# Patient Record
Sex: MALE | Race: WHITE | NOT HISPANIC OR LATINO | Employment: OTHER | URBAN - METROPOLITAN AREA
[De-identification: names, ages, dates, MRNs, and addresses within clinical notes are randomized per-mention and may not be internally consistent; named-entity substitution may affect disease eponyms.]

---

## 2022-06-07 ENCOUNTER — OFFICE VISIT (OUTPATIENT)
Dept: INTERNAL MEDICINE CLINIC | Facility: CLINIC | Age: 82
End: 2022-06-07
Payer: MEDICARE

## 2022-06-07 VITALS
OXYGEN SATURATION: 97 % | SYSTOLIC BLOOD PRESSURE: 108 MMHG | BODY MASS INDEX: 26.51 KG/M2 | TEMPERATURE: 98.2 F | DIASTOLIC BLOOD PRESSURE: 78 MMHG | HEIGHT: 70 IN | HEART RATE: 94 BPM | WEIGHT: 185.2 LBS

## 2022-06-07 DIAGNOSIS — Z87.891 FORMER SMOKER: ICD-10-CM

## 2022-06-07 DIAGNOSIS — J43.9 PULMONARY EMPHYSEMA, UNSPECIFIED EMPHYSEMA TYPE (HCC): Primary | ICD-10-CM

## 2022-06-07 DIAGNOSIS — F41.9 ANXIETY: ICD-10-CM

## 2022-06-07 DIAGNOSIS — K62.89 ANAL SPHINCTER INCOMPETENCE: ICD-10-CM

## 2022-06-07 DIAGNOSIS — L29.0 PRURITUS, PERIANAL: ICD-10-CM

## 2022-06-07 PROCEDURE — 1123F ACP DISCUSS/DSCN MKR DOCD: CPT | Performed by: INTERNAL MEDICINE

## 2022-06-07 PROCEDURE — 99205 OFFICE O/P NEW HI 60 MIN: CPT | Performed by: INTERNAL MEDICINE

## 2022-06-07 PROCEDURE — G0438 PPPS, INITIAL VISIT: HCPCS | Performed by: INTERNAL MEDICINE

## 2022-06-07 RX ORDER — HYDROCORTISONE 25 MG/G
1 CREAM TOPICAL AS NEEDED
COMMUNITY
Start: 2022-03-03 | End: 2022-06-07 | Stop reason: SDUPTHER

## 2022-06-07 RX ORDER — ALPRAZOLAM 0.5 MG/1
1 TABLET ORAL 3 TIMES DAILY PRN
COMMUNITY
Start: 2022-03-03 | End: 2022-06-07 | Stop reason: SDUPTHER

## 2022-06-07 RX ORDER — ALBUTEROL SULFATE 90 UG/1
2 AEROSOL, METERED RESPIRATORY (INHALATION) EVERY 4 HOURS PRN
Qty: 18 G | Refills: 5 | Status: SHIPPED | OUTPATIENT
Start: 2022-06-07 | End: 2023-06-02

## 2022-06-07 RX ORDER — FORMOTEROL FUMARATE 20 UG/2ML
20 SOLUTION RESPIRATORY (INHALATION) EVERY MORNING
COMMUNITY

## 2022-06-07 RX ORDER — ALPRAZOLAM 0.5 MG/1
0.5 TABLET ORAL 2 TIMES DAILY PRN
Qty: 60 TABLET | Refills: 1 | Status: SHIPPED | OUTPATIENT
Start: 2022-06-07

## 2022-06-07 RX ORDER — HYDROCORTISONE 25 MG/G
1 CREAM TOPICAL AS NEEDED
Qty: 28 G | Refills: 5 | Status: SHIPPED | OUTPATIENT
Start: 2022-06-07

## 2022-06-07 RX ORDER — ALBUTEROL SULFATE 90 UG/1
2 AEROSOL, METERED RESPIRATORY (INHALATION) EVERY 4 HOURS PRN
COMMUNITY
Start: 2022-03-29 | End: 2022-06-07 | Stop reason: SDUPTHER

## 2022-06-07 NOTE — PROGRESS NOTES
Assessment and Plan:     Problem List Items Addressed This Visit    None          Preventive health issues were discussed with patient, and age appropriate screening tests were ordered as noted in patient's After Visit Summary  Personalized health advice and appropriate referrals for health education or preventive services given if needed, as noted in patient's After Visit Summary  History of Present Illness:     Patient presents for Medicare Annual Wellness visit    No care team member to display     Problem List:     There is no problem list on file for this patient  Past Medical and Surgical History:     Past Medical History:   Diagnosis Date    BPH (benign prostatic hyperplasia)     COPD (chronic obstructive pulmonary disease) ([facility] )     Crohn's disease ([facility] )     Depression     Renal lesion     Restless legs syndrome      History reviewed  No pertinent surgical history  Family History:     Family History   Problem Relation Age of Onset    Stomach cancer Mother     Alzheimer's disease Father       Social History:     Social History     Socioeconomic History    Marital status:       Spouse name: None    Number of children: None    Years of education: None    Highest education level: None   Occupational History    None   Tobacco Use    Smoking status: Former Smoker     Packs/day: 0 50     Years: 50 00     Pack years: 25 00     Types: Cigarettes     Quit date:      Years since quittin 4    Smokeless tobacco: Never Used   Vaping Use    Vaping Use: Never used   Substance and Sexual Activity    Alcohol use: Not Currently    Drug use: Never    Sexual activity: None   Other Topics Concern    None   Social History Narrative    None     Social Determinants of Health     Financial Resource Strain: Not on file   Food Insecurity: Not on file   Transportation Needs: Not on file   Physical Activity: Not on file   Stress: Not on file   Social Connections: Not on file   Intimate Partner Violence: Not on file   Housing Stability: Not on file      Medications and Allergies:     Current Outpatient Medications   Medication Sig Dispense Refill    albuterol (PROVENTIL HFA,VENTOLIN HFA) 90 mcg/act inhaler Inhale 2 puffs every 4 (four) hours as needed for wheezing      ALPRAZolam (XANAX) 0 5 mg tablet Take 1 tablet by mouth 3 (three) times a day as needed      formoterol (PERFOROMIST) 20 MCG/2ML nebulizer solution Take 20 mcg by nebulization every morning      hydrocortisone (ANUSOL-HC) 2 5 % rectal cream Apply 1 application topically as needed for hemorrhoids       No current facility-administered medications for this visit  No Known Allergies   Immunizations:     Immunization History   Administered Date(s) Administered    COVID-19 MODERNA VACC 0 5 ML IM 03/16/2021, 04/13/2021      Health Maintenance: There are no preventive care reminders to display for this patient  Topic Date Due    DTaP,Tdap,and Td Vaccines (1 - Tdap) Never done    COVID-19 Vaccine (3 - Booster for Moderna series) 09/13/2021    Influenza Vaccine (Season Ended) 09/01/2022      Medicare Health Risk Assessment:     /78 (BP Location: Left arm, Patient Position: Sitting, Cuff Size: Standard)   Pulse 94   Temp 98 2 °F (36 8 °C) (Tympanic)   Ht 5' 9 61" (1 768 m)   Wt 84 kg (185 lb 3 2 oz)   SpO2 97%   BMI 26 88 kg/m²      Rashaun White is here for his Subsequent Wellness visit  Health Risk Assessment:   Patient rates overall health as good  Patient feels that their physical health rating is same  Patient is satisfied with their life  Eyesight was rated as same  Hearing was rated as same  Patient feels that their emotional and mental health rating is same  Patients states they are never, rarely angry  Patient states they are sometimes unusually tired/fatigued  Pain experienced in the last 7 days has been a lot  Patient's pain rating has been 5/10   Patient states that he has experienced weight loss or gain in last 6 months  Depression Screening:   PHQ-2 Score: 6      Fall Risk Screening: In the past year, patient has experienced: no history of falling in past year      Home Safety:  Patient does not have trouble with stairs inside or outside of their home  Patient has working smoke alarms and has working carbon monoxide detector  Home safety hazards include: none  Nutrition:   Current diet is Regular  Medications:   Patient is currently taking over-the-counter supplements  OTC medications include: see medication list  Patient is able to manage medications  Activities of Daily Living (ADLs)/Instrumental Activities of Daily Living (IADLs):   Walk and transfer into and out of bed and chair?: Yes  Dress and groom yourself?: Yes    Bathe or shower yourself?: Yes    Feed yourself? Yes  Do your laundry/housekeeping?: No  Manage your money, pay your bills and track your expenses?: No  Make your own meals?: No    Do your own shopping?: No    Durable [address] [place] [address]    Previous Hospitalizations:   Any hospitalizations or ED visits within the last 12 months?: No      Advance Care Planning:   Living will: Yes    Durable POA for healthcare:  Yes    Advanced directive: Yes    Advanced directive counseling given: Yes    Five wishes given: No    Patient declined ACP directive: Yes    End of Life Decisions reviewed with patient: Yes    Provider agrees with end of life decisions: Yes      Cognitive Screening:   Provider or family/friend/caregiver concerned regarding cognition?: No    PREVENTIVE SCREENINGS      Cardiovascular Screening:    General: Patient Declines      Diabetes Screening:     General: Screening Current      Colorectal Cancer Screening:     General: Screening Not Indicated and Patient Declines      Prostate Cancer Screening:    General: Screening Not Indicated and Patient Declines      Osteoporosis Screening:    General: Screening Not Indicated and Patient Declines Abdominal Aortic Aneurysm (AAA) Screening:    Risk factors include: tobacco use        General: Screening Not Indicated      Lung Cancer Screening:     General: Screening Not Indicated      Hepatitis C Screening:    General: Screening Not Indicated    Screening, Brief Intervention, and Referral to Treatment (SBIRT)    Screening  Typical number of drinks in a day: 0  Typical number of drinks in a week: 0  Interpretation: Low risk drinking behavior      Single Item Drug Screening:  How often have you used an illegal drug (including marijuana) or a prescription medication for non-medical reasons in the past year? never    Single Item Drug Screen Score: 0  Interpretation: Negative screen for possible drug use disorder      Bony Portillo MD

## 2022-06-07 NOTE — ASSESSMENT & PLAN NOTE
CT lungs cancer screening is no longer necessary as it has been greater than 15 years since he has discontinued smoking and previous screenings did not disclose any significant change in findings

## 2022-06-07 NOTE — PROGRESS NOTES
Assessment/Plan:    Anxiety  Will continue alprazolam 0 5 mg once or twice daily as needed for anxiety    Pulmonary emphysema (HCC)  Clinically stable at the present time  Patient is not using a long-acting agent and is only using his albuterol inhaler on an as-needed basis  Anal sphincter incompetence  Consequent to hemorrhoidal surgery where his anal sphincter was inadvertently cut by the surgeon    Former smoker  CT lungs cancer screening is no longer necessary as it has been greater than 15 years since he has discontinued smoking and previous screenings did not disclose any significant change in findings       Diagnoses and all orders for this visit:    Pulmonary emphysema, unspecified emphysema type (HCC)  -     albuterol (PROVENTIL HFA,VENTOLIN HFA) 90 mcg/act inhaler; Inhale 2 puffs every 4 (four) hours as needed for wheezing  -     CBC and differential; Future  -     Comprehensive metabolic panel; Future  -     T3; Future  -     T4, free; Future  -     TSH, 3rd generation; Future    Anxiety  -     ALPRAZolam (XANAX) 0 5 mg tablet; Take 1 tablet (0 5 mg total) by mouth 2 (two) times a day as needed for anxiety  -     CBC and differential; Future  -     Comprehensive metabolic panel; Future  -     T3; Future  -     T4, free; Future  -     TSH, 3rd generation; Future    Pruritus, perianal  -     hydrocortisone (ANUSOL-HC) 2 5 % rectal cream; Apply 1 application topically as needed for hemorrhoids  -     CBC and differential; Future  -     Comprehensive metabolic panel; Future  -     T3; Future  -     T4, free; Future  -     TSH, 3rd generation; Future    Anal sphincter incompetence    Former smoker    Other orders  -     Discontinue: albuterol (PROVENTIL HFA,VENTOLIN HFA) 90 mcg/act inhaler; Inhale 2 puffs every 4 (four) hours as needed for wheezing  -     Discontinue: ALPRAZolam (XANAX) 0 5 mg tablet;  Take 1 tablet by mouth 3 (three) times a day as needed  -     Discontinue: hydrocortisone (ANUSOL-HC) 2 5 % rectal cream; Apply 1 application topically as needed for hemorrhoids  -     formoterol (PERFOROMIST) 20 MCG/2ML nebulizer solution; Take 20 mcg by nebulization every morning          Subjective:      Patient ID: Hiren Saha is a 79 y o  male  Patient presents to the office to establish primary care  He has a new resident at [facility] in [place]  He has a history of chronic obstructive pulmonary disease  Former smoker having quit greater than 15 years ago  He had been under surveillance for some pulmonary nodules but these have been stable and active surveillance is no longer warranted  He has been diagnosed with COPD  Most recent pulmonary function studies showed a mild decline from those done 2 years prior FEV1 is 1 18 L  FVC is normal, diffusing capacity is low at 59% and DLCO is low consistent with emphysema  Lab work was reviewed  Hemoglobin A1c is nondiabetic  Metabolic panel is unremarkable  CBC was normal and patient was euthyroid  Lipid profile was at goal      Family History   Problem Relation Age of Onset    Stomach cancer Mother     Alzheimer's disease Father      Social History     Socioeconomic History    Marital status:       Spouse name: Not on file    Number of children: Not on file    Years of education: Not on file    Highest education level: Not on file   Occupational History    Not on file   Tobacco Use    Smoking status: Former Smoker     Packs/day: 0 50     Years: 50 00     Pack years: 25 00     Types: Cigarettes     Quit date: 56     Years since quittin 4    Smokeless tobacco: Never Used   Vaping Use    Vaping Use: Never used   Substance and Sexual Activity    Alcohol use: Not Currently    Drug use: Never    Sexual activity: Not on file   Other Topics Concern    Not on file   Social History Narrative    Not on file     Social Determinants of Health     Financial Resource Strain: Not on file   Food Insecurity: Not on file Transportation Needs: Not on file   Physical Activity: Not on file   Stress: Not on file   Social Connections: Not on file   Intimate Partner Violence: Not on file   Housing Stability: Not on file     Past Medical History:   Diagnosis Date    BPH (benign prostatic hyperplasia)     Crohn's disease (HCC)     Restless legs syndrome        Current Outpatient Medications:     albuterol (PROVENTIL HFA,VENTOLIN HFA) 90 mcg/act inhaler, Inhale 2 puffs every 4 (four) hours as needed for wheezing, Disp: 18 g, Rfl: 5    ALPRAZolam (XANAX) 0 5 mg tablet, Take 1 tablet (0 5 mg total) by mouth 2 (two) times a day as needed for anxiety, Disp: 60 tablet, Rfl: 1    formoterol (PERFOROMIST) 20 MCG/2ML nebulizer solution, Take 20 mcg by nebulization every morning, Disp: , Rfl:     hydrocortisone (ANUSOL-HC) 2 5 % rectal cream, Apply 1 application topically as needed for hemorrhoids, Disp: 28 g, Rfl: 5  No Known Allergies  History reviewed  No pertinent surgical history  Review of Systems   Constitutional: Negative  HENT: Negative  Eyes: Negative  Respiratory: Negative  Cardiovascular: Negative  Gastrointestinal: Negative  Endocrine: Negative  Genitourinary: Negative  Musculoskeletal: Positive for gait problem (Patient appears parkinsonian)  Skin: Negative  Allergic/Immunologic: Negative  Neurological: Positive for speech difficulty (Stammering speech at times) and weakness (Proximal thigh muscles)  Hematological: Negative  Psychiatric/Behavioral: Negative  Objective:      /78 (BP Location: Left arm, Patient Position: Sitting, Cuff Size: Standard)   Pulse 94   Temp 98 2 °F (36 8 °C) (Tympanic)   Ht 5' 9 61" (1 768 m)   Wt 84 kg (185 lb 3 2 oz)   SpO2 97%   BMI 26 88 kg/m²     Depression Screening Follow-up Plan: Patient's depression screening was positive with a PHQ-2 score of 6  Their PHQ-9 score was 10   Patient's depressive symptoms likely due to other medical condition  Would recommend treatment of underlying condition  Will continue to monitor at next office visit  Physical Exam  Vitals reviewed  Constitutional:       General: He is not in acute distress  Appearance: He is normal weight  He is not toxic-appearing or diaphoretic  HENT:      Head: Normocephalic and atraumatic  Right Ear: Tympanic membrane and external ear normal       Left Ear: Tympanic membrane and external ear normal       Nose: Nose normal       Mouth/Throat:      Mouth: Mucous membranes are moist    Eyes:      General: No scleral icterus  Conjunctiva/sclera: Conjunctivae normal       Pupils: Pupils are equal, round, and reactive to light  Cardiovascular:      Rate and Rhythm: Normal rate and regular rhythm  Pulses: Normal pulses  Heart sounds: Normal heart sounds  No murmur heard  Pulmonary:      Effort: Pulmonary effort is normal  No respiratory distress  Breath sounds: No wheezing or rales  Comments: Diminished breath sounds throughout both lung fields  Abdominal:      General: Abdomen is flat  Bowel sounds are normal  There is no distension  Musculoskeletal:      Cervical back: Neck supple  No rigidity or tenderness  Right lower leg: Edema (Trace pretibial edema) present  Left lower leg: Edema (Trace pretibial edema) present  Skin:     General: Skin is warm  Capillary Refill: Capillary refill takes less than 2 seconds  Coloration: Skin is not jaundiced  Findings: No bruising, erythema or rash  Neurological:      General: No focal deficit present  Mental Status: He is alert and oriented to person, place, and time  Mental status is at baseline  Coordination: Coordination abnormal (Proximal thigh muscle weakness)  Gait: Gait abnormal (Gait appears parkinsonian as does his stature)  Comments: Mild tremor    Very flat affect, almost parkinsonian like   Psychiatric:         Mood and Affect: Mood normal  Behavior: Behavior normal          Thought Content:  Thought content normal          Judgment: Judgment normal

## 2022-06-07 NOTE — ASSESSMENT & PLAN NOTE
Clinically stable at the present time  Patient is not using a long-acting agent and is only using his albuterol inhaler on an as-needed basis

## 2022-07-29 ENCOUNTER — TELEPHONE (OUTPATIENT)
Dept: INTERNAL MEDICINE CLINIC | Facility: CLINIC | Age: 82
End: 2022-07-29

## 2022-07-29 NOTE — TELEPHONE ENCOUNTER
Patient started experiencing constipation and abdominal pressure yesterday  Wanted to see if there were any over the counter medications he can try  Says that there is an enema from the pharmacy that he has not tried yet, wanted to reach out to the doctor before

## 2022-08-23 ENCOUNTER — TRANSITIONAL CARE MANAGEMENT (OUTPATIENT)
Dept: INTERNAL MEDICINE CLINIC | Facility: OTHER | Age: 82
End: 2022-08-23

## 2022-08-24 ENCOUNTER — OFFICE VISIT (OUTPATIENT)
Dept: INTERNAL MEDICINE CLINIC | Facility: CLINIC | Age: 82
End: 2022-08-24
Payer: MEDICARE

## 2022-08-24 VITALS
SYSTOLIC BLOOD PRESSURE: 126 MMHG | DIASTOLIC BLOOD PRESSURE: 80 MMHG | WEIGHT: 185.19 LBS | RESPIRATION RATE: 20 BRPM | BODY MASS INDEX: 26.51 KG/M2 | HEIGHT: 70 IN | HEART RATE: 76 BPM

## 2022-08-24 DIAGNOSIS — G20 PRIMARY PARKINSONISM (HCC): ICD-10-CM

## 2022-08-24 DIAGNOSIS — J44.1 COPD EXACERBATION (HCC): ICD-10-CM

## 2022-08-24 DIAGNOSIS — J43.2 CENTRILOBULAR EMPHYSEMA (HCC): ICD-10-CM

## 2022-08-24 DIAGNOSIS — R26.2 AMBULATORY DYSFUNCTION: Primary | ICD-10-CM

## 2022-08-24 PROBLEM — G20.C PRIMARY PARKINSONISM: Status: ACTIVE | Noted: 2022-08-24

## 2022-08-24 PROBLEM — K64.9 HEMORRHOIDS: Status: ACTIVE | Noted: 2022-07-30

## 2022-08-24 PROBLEM — F32.A DEPRESSION: Status: ACTIVE | Noted: 2022-08-02

## 2022-08-24 PROBLEM — N40.0 BPH (BENIGN PROSTATIC HYPERPLASIA): Status: ACTIVE | Noted: 2022-07-30

## 2022-08-24 PROBLEM — K50.10 CROHN'S COLITIS (HCC): Status: ACTIVE | Noted: 2022-07-30

## 2022-08-24 PROCEDURE — 99496 TRANSJ CARE MGMT HIGH F2F 7D: CPT | Performed by: INTERNAL MEDICINE

## 2022-08-24 RX ORDER — POLYETHYLENE GLYCOL 3350 17 G/17G
17 POWDER, FOR SOLUTION ORAL DAILY
COMMUNITY
Start: 2022-08-22

## 2022-08-24 RX ORDER — AMOXICILLIN 250 MG
1 CAPSULE ORAL 2 TIMES DAILY
COMMUNITY
Start: 2022-08-22 | End: 2022-09-21

## 2022-08-24 NOTE — PROGRESS NOTES
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[facility] )  Stable  Not currently requiring any medications, continue to monitor    Pulmonary emphysema (HCC)  Clinically stable  No wheezing noted on examination continues with Spiriva Respimat and Perforomist nebulizer solution    Ambulatory dysfunction  To continue with physical therapy here at the facility  He was recently discharged from short-term rehab where he did well and progressed to the point where he can now return to his assisted living facility    COPD exacerbation ([facility] )  Clinically stable  Remains on 2 L nasal cannula  Pulmonary examination significant for clear lungs with diminished breath sounds bilaterally scant localized wheezing in the right upper lobe  TCM Call     Date and time call was made  8/23/2022  8:52 AM    Hospital care reviewed  Records reviewed    Patient was hospitialized at  Other (comment)    Comment  [facility]    Date of Admission  08/02/22    Date of discharge  08/22/22    Diagnosis  cognitive impairment, Parkinsonism    Disposition  Assisted Living      TCM Call     Scheduled for follow up? 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[facility] )    Other orders  -     tiotropium (SPIRIVA RESPIMAT) 2 5 MCG/ACT AERS inhaler; Inhale 2 puffs daily  -     senna-docusate sodium (SENOKOT S) 8 6-50 mg per tablet; Take 1 tablet by mouth 2 (two) times a day  -     polyethylene glycol (GLYCOLAX) 17 GM/SCOOP powder; Take 17 g by mouth in the morning          Subjective:      Patient ID: Fabio Parker is a 79 y o  male      Patient is seen for transition of care management following recent hospitalization at [facility] and then subsequent transfer to post acute rehab due to deconditioning and ambulatory dysfunction  He was hospitalized on the   Rapid COVID test the time of admission was negative by when the patient was admitted to short-term rehab his routine COVID screening PCR was positive  He was treated with 5 days of remdesivir  [facility]  He remains somewhat weak in his legs while ambulating so he will continue with physical therapy 3 times weekly  He feels short of breath still but is not coughing  He does not feel as if he is struggling to breathe but has very little reserve when ambulating  He is oxygen dependent 2 L minute at the present time  Prior to his admission he was only using the oxygen at night  He is not experiencing any productive cough  He denies any diarrhea or abdominal pain  He tends to be a little bit on the constipated side  Appetite is fair  Family History   Problem Relation Age of Onset    Stomach cancer Mother     Alzheimer's disease Father      Social History     Socioeconomic History    Marital status:       Spouse name: Not on file    Number of children: Not on file    Years of education: Not on file    Highest education level: Not on file   Occupational History    Not on file   Tobacco Use    Smoking status: Former Smoker     Packs/day: 0 50     Years: 50 00     Pack years: 25 00     Types: Cigarettes     Quit date: 56     Years since quittin 6    Smokeless tobacco: Never Used   Vaping Use    Vaping Use: Never used   Substance and Sexual Activity    Alcohol use: Not Currently    Drug use: Never    Sexual activity: Not on file   Other Topics Concern    Not on file   Social History Narrative    Not on file     Social Determinants of Health     Financial Resource Strain: Not on file   Food Insecurity: Not on file   Transportation Needs: Not on file Physical Activity: Not on file   Stress: Not on file   Social Connections: Not on file   Intimate Partner Violence: Not on file   Housing Stability: Not on file     Past Medical History:   Diagnosis Date    BPH (benign prostatic hyperplasia)     Crohn's disease (HCC)     Restless legs syndrome        Current Outpatient Medications:     polyethylene glycol (GLYCOLAX) 17 GM/SCOOP powder, Take 17 g by mouth in the morning, Disp: , Rfl:     senna-docusate sodium (SENOKOT S) 8 6-50 mg per tablet, Take 1 tablet by mouth 2 (two) times a day, Disp: , Rfl:     tiotropium (SPIRIVA RESPIMAT) 2 5 MCG/ACT AERS inhaler, Inhale 2 puffs daily, Disp: , Rfl:     albuterol (PROVENTIL HFA,VENTOLIN HFA) 90 mcg/act inhaler, Inhale 2 puffs every 4 (four) hours as needed for wheezing, Disp: 18 g, Rfl: 5    ALPRAZolam (XANAX) 0 5 mg tablet, Take 1 tablet (0 5 mg total) by mouth 2 (two) times a day as needed for anxiety, Disp: 60 tablet, Rfl: 1    formoterol (PERFOROMIST) 20 MCG/2ML nebulizer solution, Take 20 mcg by nebulization every morning, Disp: , Rfl:     hydrocortisone (ANUSOL-HC) 2 5 % rectal cream, Apply 1 application topically as needed for hemorrhoids, Disp: 28 g, Rfl: 5  No Known Allergies  No past surgical history on file  Review of Systems   Constitutional: Positive for activity change (Poor exercise tolerance) and fatigue  Negative for appetite change, chills and fever  HENT: Negative  Eyes: Negative  Respiratory: Positive for cough, shortness of breath and wheezing (Occasional)  Cardiovascular: Positive for leg swelling (Minimal)  Negative for chest pain  Gastrointestinal: Positive for constipation  Negative for diarrhea, nausea and rectal pain  Endocrine: Negative  Genitourinary: Negative  Musculoskeletal: Negative  Allergic/Immunologic: Negative  Neurological: Positive for tremors (Mild pill rolling and cogwheeling )  Negative for numbness and headaches  Hematological: Negative  Psychiatric/Behavioral: Negative  Objective: There were no vitals taken for this visit  Physical Exam  Vitals reviewed  Constitutional:       General: He is not in acute distress  Appearance: He is normal weight  He is ill-appearing (Chronically)  He is not toxic-appearing or diaphoretic  HENT:      Head: Normocephalic and atraumatic  Comments: Masked facies     Right Ear: Tympanic membrane, ear canal and external ear normal       Left Ear: Tympanic membrane, ear canal and external ear normal       Nose: Nose normal       Mouth/Throat:      Mouth: Mucous membranes are moist       Pharynx: Oropharynx is clear  Eyes:      General: No scleral icterus  Conjunctiva/sclera: Conjunctivae normal       Pupils: Pupils are equal, round, and reactive to light  Neck:      Vascular: No carotid bruit  Cardiovascular:      Rate and Rhythm: Normal rate and regular rhythm  Pulses: Normal pulses  Heart sounds: Normal heart sounds  No murmur heard  Pulmonary:      Effort: Pulmonary effort is normal       Breath sounds: No stridor  Wheezing (Scattered right upper lobe wheezing) present  No rhonchi or rales  Comments: Diminished breath sounds throughout both lung fields  Abdominal:      General: Abdomen is flat  Bowel sounds are normal  There is no distension  Palpations: There is no mass  Tenderness: There is no abdominal tenderness  Musculoskeletal:         General: No swelling  Cervical back: Neck supple  No rigidity  Right lower leg: Edema (Trace to 1+ pedal edema) present  Left lower leg: Edema (Trace to 1+ pedal edema) present  Skin:     General: Skin is warm  Capillary Refill: Capillary refill takes less than 2 seconds  Coloration: Skin is not jaundiced  Findings: No bruising, erythema or rash  Neurological:      General: No focal deficit present  Mental Status: He is alert and oriented to person, place, and time  Mental status is at baseline  Motor: Weakness present  Coordination: Coordination abnormal       Gait: Gait abnormal (Parkinsonian)     Psychiatric:         Mood and Affect: Mood normal          Behavior: Behavior normal

## 2022-08-24 NOTE — ASSESSMENT & PLAN NOTE
To continue with physical therapy here at the facility    He was recently discharged from short-term rehab where he did well and progressed to the point where he can now return to his assisted living facility

## 2022-08-24 NOTE — ASSESSMENT & PLAN NOTE
Clinically stable    No wheezing noted on examination continues with Spiriva Respimat and Perforomist nebulizer solution

## 2022-08-24 NOTE — ASSESSMENT & PLAN NOTE
Clinically stable  Remains on 2 L nasal cannula  Pulmonary examination significant for clear lungs with diminished breath sounds bilaterally scant localized wheezing in the right upper lobe

## 2022-09-01 ENCOUNTER — TELEPHONE (OUTPATIENT)
Dept: INTERNAL MEDICINE CLINIC | Facility: CLINIC | Age: 82
End: 2022-09-01

## 2022-09-01 NOTE — TELEPHONE ENCOUNTER
Patient has had a toothache for the last few days and wanted to have a script for aleve put in  Due to patient living in an assisted living facility, the facility stated they will not give patient aleve medication unless he has a script ordered

## 2022-09-02 DIAGNOSIS — K08.89 TOOTHACHE: Primary | ICD-10-CM

## 2022-09-02 RX ORDER — ACETAMINOPHEN 325 MG/1
650 TABLET ORAL EVERY 6 HOURS PRN
Qty: 80 TABLET | Refills: 0 | Status: SHIPPED | OUTPATIENT
Start: 2022-09-02

## 2022-09-02 RX ORDER — AMLODIPINE BESYLATE 5 MG/1
5 TABLET ORAL DAILY
COMMUNITY
Start: 2022-08-22 | End: 2022-09-15 | Stop reason: SINTOL

## 2022-09-02 RX ORDER — FLUTICASONE PROPIONATE 50 MCG
1 SPRAY, SUSPENSION (ML) NASAL DAILY
COMMUNITY
Start: 2022-08-23

## 2022-09-02 RX ORDER — NAPROXEN SODIUM 220 MG
220 TABLET ORAL 2 TIMES DAILY WITH MEALS
Qty: 40 TABLET | Refills: 0 | Status: SHIPPED | OUTPATIENT
Start: 2022-09-02 | End: 2022-09-19 | Stop reason: SDUPTHER

## 2022-09-07 ENCOUNTER — DOCUMENTATION (OUTPATIENT)
Dept: INTERNAL MEDICINE CLINIC | Facility: CLINIC | Age: 82
End: 2022-09-07

## 2022-09-07 DIAGNOSIS — K08.89 TOOTHACHE: Primary | ICD-10-CM

## 2022-09-14 ENCOUNTER — TELEPHONE (OUTPATIENT)
Dept: INTERNAL MEDICINE CLINIC | Facility: CLINIC | Age: 82
End: 2022-09-14

## 2022-09-14 NOTE — TELEPHONE ENCOUNTER
Home care is calling about patient that his swelling to his right foot ankle and calf slightly worsening      Patient is [facility]

## 2022-09-15 NOTE — TELEPHONE ENCOUNTER
Spoke to [employer] at [facility]  Discontinue Amlodipine and Dr Luis Warner will examine patient on 9/21/22

## 2022-09-19 DIAGNOSIS — K08.89 TOOTHACHE: ICD-10-CM

## 2022-09-19 RX ORDER — NAPROXEN SODIUM 220 MG
220 TABLET ORAL 2 TIMES DAILY WITH MEALS
Qty: 60 TABLET | Refills: 5 | Status: SHIPPED | OUTPATIENT
Start: 2022-09-19

## 2022-09-21 ENCOUNTER — NURSING HOME VISIT (OUTPATIENT)
Dept: GERIATRICS | Facility: OTHER | Age: 82
End: 2022-09-21
Payer: MEDICARE

## 2022-09-21 VITALS — DIASTOLIC BLOOD PRESSURE: 80 MMHG | HEART RATE: 80 BPM | RESPIRATION RATE: 22 BRPM | SYSTOLIC BLOOD PRESSURE: 146 MMHG

## 2022-09-21 DIAGNOSIS — R10.13 DYSPEPSIA: ICD-10-CM

## 2022-09-21 DIAGNOSIS — J43.2 CENTRILOBULAR EMPHYSEMA (HCC): Primary | ICD-10-CM

## 2022-09-21 DIAGNOSIS — G20 PRIMARY PARKINSONISM (HCC): ICD-10-CM

## 2022-09-21 DIAGNOSIS — F41.9 ANXIETY: ICD-10-CM

## 2022-09-21 PROBLEM — J44.9 COPD (CHRONIC OBSTRUCTIVE PULMONARY DISEASE) (HCC): Status: ACTIVE | Noted: 2022-07-30

## 2022-09-21 PROCEDURE — 99335 PR DOM/R-HOME E/M EST PT LW MOD SEVERITY 25 MINUTES: CPT | Performed by: INTERNAL MEDICINE

## 2022-09-22 NOTE — ASSESSMENT & PLAN NOTE
Consists will consider initiating some Parkinson's medications perhaps low-dose Sinemet in the near future

## 2022-09-22 NOTE — PROGRESS NOTES
Assessment/Plan:    No problem-specific Assessment & Plan notes found for this encounter  Diagnoses and all orders for this visit:    Centrilobular emphysema ([facility] )    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[facility] )          Subjective:      Patient ID: Mildred Tracy is a 79 y o  male  The patient is seen at [facility] in [place]  76-year-old gentleman with history of COPD and parkinsonism requested to be seen because he would like to have his over-the-counter medications restored  During his recent hospitalization he was found to have multiple over-the-counter medications which should really be administered in a supervised capacity  Presently the patient was seen on his way back from his breakfast   He was ambulating in the hallway with a walker upon arrival to his room was noted to be significantly short of breath and fatigued  He sat down in his room and his oxygen tubing was replaced and his machine was turned on and within a few minutes he began to feel comfortable and less dyspneic  He denies any cough  He has had no fever chills  He was stating that he does experience occasional indigestion and dyspepsia and would like to be able to use Pepto-Bismol over-the-counter as needed rather than to contact the staff  He was not very happy with the fact that he could only get 1 dose of Pepto-Bismol and then had to call the nurse again and was told that it was not time for 2nd dose  He also has a history of Parkinson's disease but he is not on any parkinsonian medication at this time  Family History   Problem Relation Age of Onset    Stomach cancer Mother     Alzheimer's disease Father      Social History     Socioeconomic History    Marital status:       Spouse name: Not on file    Number of children: Not on file    Years of education: Not on file    Highest education level: Not on file   Occupational History    Not on file   Tobacco Use    Smoking status: Former Smoker Packs/day: 0 50     Years: 50 00     Pack years: 25 00     Types: Cigarettes     Quit date: 56     Years since quittin 7    Smokeless tobacco: Never Used   Vaping Use    Vaping Use: Never used   Substance and Sexual Activity    Alcohol use: Not Currently    Drug use: Never    Sexual activity: Not on file   Other Topics Concern    Not on file   Social History Narrative    Not on file     Social Determinants of Health     Financial Resource Strain: Not on file   Food Insecurity: Not on file   Transportation Needs: Not on file   Physical Activity: Not on file   Stress: Not on file   Social Connections: Not on file   Intimate Partner Violence: Not on file   Housing Stability: Not on file     Past Medical History:   Diagnosis Date    BPH (benign prostatic hyperplasia)     Crohn's disease ([facility] )     Restless legs syndrome        Current Outpatient Medications:     acetaminophen (TYLENOL) 325 mg tablet, Take 2 tablets (650 mg total) by mouth every 6 (six) hours as needed for mild pain, Disp: 80 tablet, Rfl: 0    albuterol (PROVENTIL HFA,VENTOLIN HFA) 90 mcg/act inhaler, Inhale 2 puffs every 4 (four) hours as needed for wheezing, Disp: 18 g, Rfl: 5    ALPRAZolam (XANAX) 0 5 mg tablet, Take 1 tablet (0 5 mg total) by mouth 2 (two) times a day as needed for anxiety, Disp: 60 tablet, Rfl: 1    fluticasone (FLONASE) 50 mcg/act nasal spray, 1 spray into each nostril daily, Disp: , Rfl:     formoterol (PERFOROMIST) 20 MCG/2ML nebulizer solution, Take 20 mcg by nebulization every morning, Disp: , Rfl:     hydrocortisone (ANUSOL-HC) 2 5 % rectal cream, Apply 1 application topically as needed for hemorrhoids, Disp: 28 g, Rfl: 5    naproxen sodium (ALEVE) 220 MG tablet, Take 1 tablet (220 mg total) by mouth 2 (two) times a day with meals, Disp: 60 tablet, Rfl: 5    polyethylene glycol (GLYCOLAX) 17 GM/SCOOP powder, Take 17 g by mouth in the morning, Disp: , Rfl:     senna-docusate sodium (SENOKOT S) 8 6-50 mg per tablet, Take 1 tablet by mouth 2 (two) times a day, Disp: , Rfl:     tiotropium (SPIRIVA RESPIMAT) 2 5 MCG/ACT AERS inhaler, Inhale 2 puffs daily, Disp: , Rfl:   No Known Allergies  No past surgical history on file  Review of Systems   Constitutional: Negative  HENT: Negative  Eyes: Negative  Respiratory: Positive for shortness of breath (On activity)  Negative for apnea, cough, choking, chest tightness, wheezing and stridor  Cardiovascular: Negative for chest pain, palpitations and leg swelling  Gastrointestinal: Negative  Endocrine: Negative  Genitourinary: Negative  Musculoskeletal: Negative  Neurological: Positive for tremors (Parkinsonian) and speech difficulty (Monotone speech)  Psychiatric/Behavioral: Negative  Objective:      /80 (BP Location: Left arm, Patient Position: Sitting, Cuff Size: Standard)   Pulse 80   Resp 22          Physical Exam  Vitals reviewed  Constitutional:       General: He is not in acute distress  Appearance: Normal appearance  He is obese  He is not ill-appearing, toxic-appearing or diaphoretic  HENT:      Head: Normocephalic and atraumatic  Right Ear: External ear normal       Left Ear: External ear normal    Eyes:      General: No scleral icterus  Conjunctiva/sclera: Conjunctivae normal       Pupils: Pupils are equal, round, and reactive to light  Neck:      Vascular: No JVD  Trachea: No tracheal deviation  Cardiovascular:      Rate and Rhythm: Normal rate  Pulses: Normal pulses  Heart sounds: Normal heart sounds  No murmur heard  Comments: Initially tachycardic but with oxygen and sitting in his chair for 2 minutes heart rate improved to 80 from 100  Pulmonary:      Effort: Pulmonary effort is normal       Breath sounds: Rales (Scant rales in the left base) present  No wheezing  Comments: Diminished breath sounds throughout both lung fields  Abdominal:      General: Abdomen is flat  Palpations: Abdomen is soft  Tenderness: There is no abdominal tenderness  Musculoskeletal:      Cervical back: Neck supple  No rigidity  Right lower leg: No edema  Left lower leg: No edema  Skin:     General: Skin is warm  Capillary Refill: Capillary refill takes less than 2 seconds  Coloration: Skin is not jaundiced  Findings: No bruising, erythema or rash  Neurological:      General: No focal deficit present  Mental Status: He is alert and oriented to person, place, and time  Mental status is at baseline  Coordination: Coordination abnormal (Parkinsonian gait)  Gait: Gait abnormal (Parkinsonian)     Psychiatric:         Mood and Affect: Mood normal          Behavior: Behavior normal

## 2022-09-23 ENCOUNTER — TELEPHONE (OUTPATIENT)
Dept: INTERNAL MEDICINE CLINIC | Facility: CLINIC | Age: 82
End: 2022-09-23

## 2022-09-23 NOTE — TELEPHONE ENCOUNTER
Cristine Patel from [facility] wanted to inform you that she has placed a consult / evaluation for speech language patho

## 2022-10-12 ENCOUNTER — TELEPHONE (OUTPATIENT)
Dept: INTERNAL MEDICINE CLINIC | Facility: CLINIC | Age: 82
End: 2022-10-12

## 2022-10-12 NOTE — TELEPHONE ENCOUNTER
Mary Jo from [facility] home care called saying that the pt has worsening edema in his right lower extremities and has brown sputum coming out in his cough

## 2022-10-14 ENCOUNTER — OFFICE VISIT (OUTPATIENT)
Dept: INTERNAL MEDICINE CLINIC | Facility: CLINIC | Age: 82
End: 2022-10-14
Payer: MEDICARE

## 2022-10-14 VITALS
SYSTOLIC BLOOD PRESSURE: 117 MMHG | TEMPERATURE: 98.9 F | OXYGEN SATURATION: 93 % | BODY MASS INDEX: 27.46 KG/M2 | DIASTOLIC BLOOD PRESSURE: 74 MMHG | WEIGHT: 191.8 LBS | HEIGHT: 70 IN | HEART RATE: 93 BPM

## 2022-10-14 DIAGNOSIS — G20 PRIMARY PARKINSONISM (HCC): Primary | ICD-10-CM

## 2022-10-14 DIAGNOSIS — J43.2 CENTRILOBULAR EMPHYSEMA (HCC): ICD-10-CM

## 2022-10-14 DIAGNOSIS — Z23 NEED FOR INFLUENZA VACCINATION: ICD-10-CM

## 2022-10-14 DIAGNOSIS — F41.9 ANXIETY: ICD-10-CM

## 2022-10-14 DIAGNOSIS — J43.0 UNILATERAL EMPHYSEMA (HCC): ICD-10-CM

## 2022-10-14 DIAGNOSIS — R60.9 PERIPHERAL EDEMA: ICD-10-CM

## 2022-10-14 PROBLEM — R60.0 PERIPHERAL EDEMA: Status: ACTIVE | Noted: 2022-10-14

## 2022-10-14 PROCEDURE — 90662 IIV NO PRSV INCREASED AG IM: CPT | Performed by: INTERNAL MEDICINE

## 2022-10-14 PROCEDURE — G0008 ADMIN INFLUENZA VIRUS VAC: HCPCS | Performed by: INTERNAL MEDICINE

## 2022-10-14 PROCEDURE — 99214 OFFICE O/P EST MOD 30 MIN: CPT | Performed by: INTERNAL MEDICINE

## 2022-10-14 RX ORDER — BENZONATATE 200 MG/1
CAPSULE ORAL
COMMUNITY
Start: 2022-09-09

## 2022-10-14 RX ORDER — OMEPRAZOLE 20 MG/1
20 CAPSULE, DELAYED RELEASE ORAL DAILY
COMMUNITY

## 2022-10-14 RX ORDER — AMOXICILLIN 250 MG
CAPSULE ORAL
COMMUNITY
Start: 2022-09-09

## 2022-10-14 RX ORDER — ERGOCALCIFEROL 1.25 MG/1
50000 CAPSULE ORAL WEEKLY
COMMUNITY

## 2022-10-14 RX ORDER — GUAIFENESIN 600 MG/1
1200 TABLET, EXTENDED RELEASE ORAL EVERY 12 HOURS
COMMUNITY

## 2022-10-14 NOTE — ASSESSMENT & PLAN NOTE
Increased peripheral edema primarily of the right lower extremity    We will obtain a venous Doppler to rule out any possibility of DVT

## 2022-10-14 NOTE — ASSESSMENT & PLAN NOTE
Alprazolam administration needs to be in the control of the nurses at the facility  Patient was requesting be able to self administer

## 2022-10-14 NOTE — PROGRESS NOTES
Name: Jenn Albarado      : 1940      MRN: [id number]  Encounter Provider: Fede Kan MD  Encounter Date: 10/14/2022   Encounter department: [address] [place] [address]     1  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[facility] [address] )  Assessment & Plan:  Clinically stable  Has follow-up with Neurology in early December  Patient would likely benefit by addition of carbidopa therapy      2  Peripheral edema  Assessment & Plan:  Increased peripheral edema primarily of the right lower extremity  We will obtain a venous Doppler to rule out any possibility of DVT    Orders:  -     VAS lower limb venous duplex study, unilateral/limited; Future; Expected date: 10/14/2022    3  Centrilobular emph[facility]sema ([facility] )  Assessment & Plan:  Clinically stable  Patient feels much more comfortable now that he is able to self administer his albuterol inhaler      4  Unilateral emphysema ([facility] )  Assessment & Plan:  Clinically stable  Pulmonary status is stable without wheezing or rhonchi  5  Anxiety  Assessment & Plan:  Alprazolam administration needs to be in the control of the nurses at the facility  Patient was requesting be able to self administer  6  Need for influenza vaccination  -     influenza vaccine, high-dose, PF 0 7 mL (FLUZONE HIGH-DOSE)         Subjective      A patient was brought to the office from [facility] out of concerns for some increasing peripheral edema and reports of productive cough with brown phlegm  Patient denies any coughing  He denies any shortness of breath or wheezing  He has had no chills, night sweats or fevers  He does have some increasing edema in his right leg is comparison to his left leg but states that he has had bilateral leg swelling for quite some time now, several weeks  He denies any pain in his legs especially his calves while ambulating  His appetite remains good    He is requesting to have his alprazolam kept in his room where he can take it when he needs it  Discussed the use of alprazolam and the risks associated with some unrestricted use  I recommended to the patient that the alprazolam administration continue to be supervised by the nursing staff rather than he be allowed to take it on his own  Review of Systems   Constitutional: Positive for activity change (Ambulating with a walker as opposed to wheelchair)  HENT: Negative  Eyes: Negative  Respiratory: Negative  Cardiovascular: Positive for leg swelling (Bilateral lower extremity edema right greater than left  )  Gastrointestinal: Negative  Endocrine: Negative  Genitourinary: Negative  Musculoskeletal: Negative  Skin: Negative  Allergic/Immunologic: Negative  Neurological: Negative  Hematological: Negative  Psychiatric/Behavioral: The patient is nervous/anxious  Current Outpatient Medications on File Prior to Visit   Medication Sig   • acetaminophen (TYLENOL) 325 mg tablet Take 2 tablets (650 mg total) by mouth every 6 (six) hours as needed for mild pain   • albuterol (PROVENTIL HFA,VENTOLIN HFA) 90 mcg/act inhaler Inhale 2 puffs every 4 (four) hours as needed for wheezing   • ALPRAZolam (XANAX) 0 5 mg tablet Take 1 tablet (0 5 mg total) by mouth 2 (two) times a day as needed for anxiety   • benzonatate (TESSALON) 200 MG capsule 1 CAP BY MOUTH THREE TIMES DAILY AS NEEDED   • bismuth subsalicylate (PEPTO BISMOL) 524 mg/30 mL oral suspension Take 15 mL (262 mg total) by mouth every 6 (six) hours as needed for indigestion Patient may self medicate and keep medication in his room     • ergocalciferol (VITAMIN D2) 50,000 units Take 50,000 Units by mouth once a week   • fluticasone (FLONASE) 50 mcg/act nasal spray 1 spray into each nostril daily   • formoterol (PERFOROMIST) 20 MCG/2ML nebulizer solution Take 20 mcg by nebulization every morning   • guaiFENesin (Mucinex) 600 mg 12 hr tablet Take 1,200 mg by mouth every 12 (twelve) hours   • hydrocortisone (ANUSOL-HC) 2 5 % rectal cream Apply 1 application topically as needed for hemorrhoids   • Misc Natural Products (URINOZINC PO) Take 2 tablets by mouth in the morning   • naproxen sodium (ALEVE) 220 MG tablet Take 1 tablet (220 mg total) by mouth 2 (two) times a day with meals   • omeprazole (PriLOSEC) 20 mg delayed release capsule Take 20 mg by mouth daily   • senna-docusate sodium (SENOKOT S) 8 6-50 mg per tablet 1 TAB BY MOUTH TWICE DAILY   • tiotropium (SPIRIVA RESPIMAT) 2 5 MCG/ACT AERS inhaler Inhale 2 puffs daily   • polyethylene glycol (GLYCOLAX) 17 GM/SCOOP powder Take 17 g by mouth in the morning (Patient not taking: Reported on 10/14/2022)       Objective     /74 (BP Location: Left arm, Patient Position: Sitting, Cuff Size: Standard)   Pulse 93   Temp 98 9 °F (37 2 °C) (Tympanic)   Ht 5' 9 5" (1 765 m)   Wt 87 kg (191 lb 12 8 oz)   SpO2 93%   BMI 27 92 kg/m²     Physical Exam  Vitals reviewed  Constitutional:       General: He is not in acute distress  Appearance: Normal appearance  He is normal weight  He is not ill-appearing, toxic-appearing or diaphoretic  HENT:      Head: Normocephalic and atraumatic  Right Ear: External ear normal       Left Ear: External ear normal       Nose: Nose normal    Eyes:      General: No scleral icterus  Conjunctiva/sclera: Conjunctivae normal       Pupils: Pupils are equal, round, and reactive to light  Neck:      Vascular: No carotid bruit or JVD  Trachea: No tracheal deviation  Cardiovascular:      Rate and Rhythm: Normal rate and regular rhythm  Pulses: Normal pulses  Heart sounds: Normal heart sounds  No murmur heard  Pulmonary:      Effort: Pulmonary effort is normal  No respiratory distress  Breath sounds: Normal breath sounds  No rales  Abdominal:      General: Abdomen is flat  There is no distension  Musculoskeletal:         General: Swelling (Bilateral lower extremity edema) present   No tenderness  Cervical back: Neck supple  Right lower leg: Edema (2+-3+ right lower extremity edema to the knee) present  Left lower leg: Edema (2+ edema to the level of the knee) present  Skin:     General: Skin is warm  Coloration: Skin is not jaundiced  Findings: No bruising, erythema or rash  Neurological:      General: No focal deficit present  Mental Status: He is alert and oriented to person, place, and time  Mental status is at baseline  Coordination: Coordination abnormal (Very mild parkinsonian tremor)  Gait: Gait abnormal (Parkinsonian)        Comments: Parkinsonian gait and facies, monotone quality to voice   Psychiatric:         Mood and Affect: Mood normal          Behavior: Behavior normal        Jonas Velazquez MD

## 2022-10-14 NOTE — ASSESSMENT & PLAN NOTE
Clinically stable    Patient feels much more comfortable now that he is able to self administer his albuterol inhaler

## 2022-10-14 NOTE — ASSESSMENT & PLAN NOTE
Clinically stable  Has follow-up with Neurology in early December    Patient would likely benefit by addition of carbidopa therapy

## 2022-10-19 ENCOUNTER — TELEPHONE (OUTPATIENT)
Dept: INTERNAL MEDICINE CLINIC | Facility: CLINIC | Age: 82
End: 2022-10-19

## 2022-10-19 ENCOUNTER — NURSING HOME VISIT (OUTPATIENT)
Dept: GERIATRICS | Facility: OTHER | Age: 82
End: 2022-10-19
Payer: MEDICARE

## 2022-10-19 DIAGNOSIS — R60.9 PERIPHERAL EDEMA: Primary | ICD-10-CM

## 2022-10-19 DIAGNOSIS — I87.2 VENOUS INSUFFICIENCY (CHRONIC) (PERIPHERAL): ICD-10-CM

## 2022-10-19 PROCEDURE — 99334 PR DOM/R-HOME E/M EST PT SELF-LMTD/MINOR 15 MINUTES: CPT | Performed by: INTERNAL MEDICINE

## 2022-10-19 NOTE — PROGRESS NOTES
Assessment/Plan:    Peripheral edema  Most likely multifactorial   Chronic venous insufficiency along with elevated pulmonary pressures due to his emphysema which may be contributing    Venous insufficiency (chronic) (peripheral)  Knee-high venous compression socks will be requested with pressure of 20 to 30      Subjective:      Patient ID: Nas Villeda is a 79 y o  male  The patient is seen at [address] [place] [address] for follow-up after obtaining his venous Doppler studies  Studies were negative for any DVT but did demonstrate findings consistent with chronic venous insufficiency of the bilateral lower extremities  Patient would likely benefit from compression stockings which we will initiate below knee at a pressure of 20 to 30 mm Hg and if this does not provide significant relief we will increase to 30 to 40 mmHg          Review of Systems   Constitutional: Negative  HENT: Negative  Eyes: Negative  Respiratory: Positive for shortness of breath (Occasionally) and wheezing (Occasionally, relieved with albuterol)  Cardiovascular: Positive for leg swelling  Gastrointestinal: Negative  Genitourinary: Negative  Musculoskeletal: Negative  Skin: Negative  Neurological:        Parkinsonism   Psychiatric/Behavioral: Negative  Objective: There were no vitals taken for this visit  Physical Exam  Constitutional:       General: He is not in acute distress  Appearance: He is not ill-appearing, toxic-appearing or diaphoretic  Comments: Parkinsonian stature and gait   HENT:      Head: Normocephalic and atraumatic  Right Ear: External ear normal       Left Ear: External ear normal    Eyes:      General: No scleral icterus  Conjunctiva/sclera: Conjunctivae normal       Pupils: Pupils are equal, round, and reactive to light  Cardiovascular:      Rate and Rhythm: Normal rate and regular rhythm     Pulmonary:      Effort: Pulmonary effort is normal  No respiratory distress  Breath sounds: No wheezing  Abdominal:      General: Abdomen is flat  There is no distension  Musculoskeletal:      Cervical back: Neck supple  Right lower leg: Edema (2+ to 3+ pedal and pretibial edema) present  Left lower leg: Edema (2+-3+ pedal and pretibial edema) present  Skin:     General: Skin is warm  Capillary Refill: Capillary refill takes less than 2 seconds  Coloration: Skin is not jaundiced  Findings: No bruising, erythema or rash  Neurological:      General: No focal deficit present  Mental Status: He is alert  Coordination: Coordination abnormal (Parkinsonian)        Comments: Parkinsonian facies and gait   Psychiatric:         Mood and Affect: Mood normal          Behavior: Behavior normal

## 2022-10-19 NOTE — ASSESSMENT & PLAN NOTE
Most likely multifactorial   Chronic venous insufficiency along with elevated pulmonary pressures due to his emphysema which may be contributing

## 2022-10-19 NOTE — TELEPHONE ENCOUNTER
Nurse from [facility] home care services wanted to inform Dr CALDERON, that he has been discharged from there services as of 10/19/22  He still has swelling in his lower bilateral extremities as well as worsening shortness of breath  Patient did state it was mild in comparison to last week  [facility] is in office

## 2022-10-20 NOTE — TELEPHONE ENCOUNTER
Dr Shobha Jaimes examined patient at [address] [facility] on 10/19/22 and ordered Compression Stockings  RX faxed to [facility]

## 2022-11-08 DIAGNOSIS — J43.2 CENTRILOBULAR EMPHYSEMA (HCC): Primary | ICD-10-CM

## 2022-11-09 ENCOUNTER — NURSING HOME VISIT (OUTPATIENT)
Dept: GERIATRICS | Facility: OTHER | Age: 82
End: 2022-11-09

## 2022-11-09 VITALS — HEART RATE: 76 BPM | DIASTOLIC BLOOD PRESSURE: 76 MMHG | RESPIRATION RATE: 18 BRPM | SYSTOLIC BLOOD PRESSURE: 122 MMHG

## 2022-11-09 DIAGNOSIS — I87.2 VENOUS INSUFFICIENCY (CHRONIC) (PERIPHERAL): Primary | ICD-10-CM

## 2022-11-09 DIAGNOSIS — J43.2 CENTRILOBULAR EMPHYSEMA (HCC): ICD-10-CM

## 2022-11-09 DIAGNOSIS — R60.9 PERIPHERAL EDEMA: ICD-10-CM

## 2022-11-09 DIAGNOSIS — J43.1 PANLOBULAR EMPHYSEMA (HCC): ICD-10-CM

## 2022-11-09 RX ORDER — TORSEMIDE 20 MG/1
20 TABLET ORAL DAILY
Qty: 30 TABLET | Refills: 5 | Status: SHIPPED | OUTPATIENT
Start: 2022-11-09

## 2022-11-09 RX ORDER — BUDESONIDE 0.25 MG/2ML
0.25 INHALANT ORAL 2 TIMES DAILY
Qty: 120 ML | Refills: 2 | Status: SHIPPED | OUTPATIENT
Start: 2022-11-09

## 2022-11-09 NOTE — PROGRESS NOTES
Assessment/Plan:    COPD (chronic obstructive pulmonary disease) (HCC)  We will be providing the patient additional nebulizer therapy with  Pulmicort to be taken together with formoterol and Spiriva    Peripheral edema  Initiate torsemide 20 mg daily along with KCl 10 mEq daily    Pulmonary emphysema (HCC)  Pulmicort via nebulizer twice daily    Venous insufficiency (chronic) (peripheral)  Will initiate diuretic therapy  Follow-up labs in 2 weeks  Subjective:      Patient ID: Dejuan Noel is a 79 y o  male  Patient is seen at [address] [place] [address] because of frequent use of his Proventil inhaler  He states he uses the Proventil inhaler because he frequently feels short of breath  He is noted to be increasingly edematous in his lower extremities  He does have a history of chronic venous insufficiency and was seen a few weeks ago because of increased swelling in his lower extremities  He was prescribed compression stockings but he will not tolerate them  He denies orthopnea or PND  His dyspnea is on exertional activities while walking  He would also like to be able to use portable oxygen through a concentrator  He has a larger concentrating unit  We will contact his oxygen provider to see if he can qualify  He denies any fevers or chills  He denies any chest pain or palpitations  He has had no nausea or vomiting  He denies cough  He he denies orthopnea or PND dyspnea is mostly on exertional activities          Review of Systems   Constitutional: Positive for fatigue  Negative for activity change, appetite change, chills, diaphoresis, fever and unexpected weight change  HENT: Negative for congestion, postnasal drip, rhinorrhea, sinus pressure, sinus pain, sore throat, trouble swallowing and voice change  Eyes: Negative  Respiratory: Positive for cough (Minimal) and shortness of breath (On exertion)  Negative for chest tightness, wheezing (Mild) and stridor  Cardiovascular: Positive for leg swelling (Bilateral leg swelling)  Gastrointestinal: Negative  Endocrine: Negative  Genitourinary: Negative  Musculoskeletal: Negative  Neurological: Negative  Objective: There were no vitals taken for this visit  Physical Exam  Constitutional:       General: He is not in acute distress  Appearance: He is normal weight  He is not ill-appearing, toxic-appearing or diaphoretic  HENT:      Head: Normocephalic and atraumatic  Right Ear: External ear normal       Left Ear: External ear normal       Nose: Nose normal    Eyes:      General: No scleral icterus  Conjunctiva/sclera: Conjunctivae normal       Pupils: Pupils are equal, round, and reactive to light  Cardiovascular:      Rate and Rhythm: Normal rate and regular rhythm  Heart sounds: Normal heart sounds  No murmur heard  Pulmonary:      Effort: Pulmonary effort is normal       Breath sounds: No wheezing, rhonchi or rales  Comments: Markedly diminished breath sounds bilaterally  Abdominal:      General: Abdomen is flat  Bowel sounds are normal  There is no distension  Musculoskeletal:      Cervical back: Neck supple  Right lower leg: Edema (Two to 3+ pretibial and pedal edema) present  Left lower leg: Edema (Two to 3+ pretibial and pedal edema) present  Lymphadenopathy:      Cervical: No cervical adenopathy  Skin:     General: Skin is warm  Coloration: Skin is not jaundiced  Findings: No bruising, erythema or rash  Neurological:      General: No focal deficit present  Mental Status: He is alert and oriented to person, place, and time  Mental status is at baseline  Gait: Gait abnormal (Parkinsonian)     Psychiatric:         Mood and Affect: Mood normal          Behavior: Behavior normal

## 2022-11-09 NOTE — ASSESSMENT & PLAN NOTE
We will be providing the patient additional nebulizer therapy with  Pulmicort to be taken together with formoterol and Spiriva

## 2022-11-28 ENCOUNTER — TELEPHONE (OUTPATIENT)
Dept: INTERNAL MEDICINE CLINIC | Facility: CLINIC | Age: 82
End: 2022-11-28

## 2022-11-28 NOTE — TELEPHONE ENCOUNTER
[facility] Called stating they have received a request for rx furosemide however, a script was sent to [facility] for rx torsemide (DEMADEX) 20 mg tablet  [facility] would like to know which medication should patient be on?

## 2022-11-29 NOTE — TELEPHONE ENCOUNTER
Spoke with nurse Steph Chavez at [address] [facility] [address] pt should be taking torsemide 20 mg daily

## 2022-12-08 ENCOUNTER — TELEPHONE (OUTPATIENT)
Dept: INTERNAL MEDICINE CLINIC | Facility: OTHER | Age: 82
End: 2022-12-08

## 2023-01-09 DIAGNOSIS — J06.9 URI WITH COUGH AND CONGESTION: Primary | ICD-10-CM

## 2023-01-09 RX ORDER — GUAIFENESIN 600 MG/1
1200 TABLET, EXTENDED RELEASE ORAL EVERY 12 HOURS
Qty: 40 TABLET | Refills: 1 | Status: SHIPPED | OUTPATIENT
Start: 2023-01-09 | End: 2023-01-11 | Stop reason: SDUPTHER

## 2023-01-11 ENCOUNTER — NURSING HOME VISIT (OUTPATIENT)
Dept: GERIATRICS | Facility: OTHER | Age: 83
End: 2023-01-11

## 2023-01-11 VITALS — SYSTOLIC BLOOD PRESSURE: 140 MMHG | RESPIRATION RATE: 18 BRPM | HEART RATE: 80 BPM | DIASTOLIC BLOOD PRESSURE: 80 MMHG

## 2023-01-11 DIAGNOSIS — G20 PRIMARY PARKINSONISM (HCC): ICD-10-CM

## 2023-01-11 DIAGNOSIS — J06.9 URI WITH COUGH AND CONGESTION: ICD-10-CM

## 2023-01-11 DIAGNOSIS — J43.2 CENTRILOBULAR EMPHYSEMA (HCC): ICD-10-CM

## 2023-01-11 DIAGNOSIS — J44.1 ACUTE EXACERBATION OF CHRONIC OBSTRUCTIVE PULMONARY DISEASE (COPD) (HCC): ICD-10-CM

## 2023-01-11 DIAGNOSIS — J43.1 PANLOBULAR EMPHYSEMA (HCC): Primary | ICD-10-CM

## 2023-01-11 RX ORDER — AZITHROMYCIN 250 MG/1
TABLET, FILM COATED ORAL
Qty: 6 TABLET | Refills: 0 | Status: SHIPPED | OUTPATIENT
Start: 2023-01-11 | End: 2023-01-15

## 2023-01-11 RX ORDER — PREDNISONE 20 MG/1
40 TABLET ORAL DAILY
Qty: 10 TABLET | Refills: 0 | Status: SHIPPED | OUTPATIENT
Start: 2023-01-11 | End: 2023-01-16

## 2023-01-11 RX ORDER — GUAIFENESIN 600 MG/1
1200 TABLET, EXTENDED RELEASE ORAL EVERY 12 HOURS
Qty: 40 TABLET | Refills: 1 | Status: SHIPPED | OUTPATIENT
Start: 2023-01-11

## 2023-01-11 NOTE — PROGRESS NOTES
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[facility] )    No change to current medications  COPD (chronic obstructive pulmonary disease) ([facility] )   Patient will be initiated on a Z-Mario, 5 day course of prednisone 40 mg daily and Mucinex tablets 1200 mg twice daily    Pulmonary emphysema ([facility] )   Will initiate treatment for exacerbation of COPD          Subjective:      Patient ID: Giovana Bullock is a 79 y o  male  The patient was evaluated in his room at [facility]  He has been coughing for several days with some mild shortness of breath  He denies any wheezing  He has had no fevers  He denies night sweats, nausea or vomiting, diarrhea  He denies any shortness of breath at the moment  Cough is productive of moderate amounts of mucus  His appetite is good  He appears well hydrated  Review of Systems   Constitutional: Negative for activity change (Sedentary), chills, diaphoresis and fever  HENT: Negative  Eyes: Negative  Respiratory: Positive for cough  Negative for shortness of breath and wheezing  Cardiovascular: Negative  Gastrointestinal: Negative  Genitourinary: Negative  Musculoskeletal: Negative  Neurological: Positive for tremors ( parkinsonism)  Hematological: Negative  Psychiatric/Behavioral: Negative  Objective:      /80 (BP Location: Left arm, Patient Position: Sitting, Cuff Size: Standard)   Pulse 80   Resp 18          Physical Exam  Constitutional:       General: He is not in acute distress  Appearance: Normal appearance  He is not ill-appearing, toxic-appearing or diaphoretic  HENT:      Head: Normocephalic and atraumatic  Nose: Nose normal    Eyes:      General: No scleral icterus  Conjunctiva/sclera: Conjunctivae normal       Pupils: Pupils are equal, round, and reactive to light  Cardiovascular:      Rate and Rhythm: Normal rate and regular rhythm  Heart sounds: Normal heart sounds     Pulmonary: Effort: No respiratory distress  Breath sounds: No wheezing  Comments: Diminished breath sounds to both lung fields  No wheezing or rales appreciated  Mild anterior rhonchi in the mid lung field on the left  Abdominal:      General: Abdomen is flat  Bowel sounds are normal  There is no distension  Musculoskeletal:      Cervical back: Neck supple  Right lower leg: No edema  Left lower leg: No edema  Skin:     General: Skin is warm  Coloration: Skin is not jaundiced  Findings: No bruising, erythema or rash  Neurological:      General: No focal deficit present  Mental Status: He is alert and oriented to person, place, and time  Mental status is at baseline        Comments:  Parkinsonian habitus   Psychiatric:         Mood and Affect: Mood normal          Behavior: Behavior normal

## 2023-01-12 NOTE — ASSESSMENT & PLAN NOTE
Patient will be initiated on a Z-Mario, 5 day course of prednisone 40 mg daily and Mucinex tablets 1200 mg twice daily

## 2023-02-01 ENCOUNTER — TRANSITIONAL CARE MANAGEMENT (OUTPATIENT)
Dept: INTERNAL MEDICINE CLINIC | Facility: OTHER | Age: 83
End: 2023-02-01

## 2023-02-01 ENCOUNTER — OFFICE VISIT (OUTPATIENT)
Dept: INTERNAL MEDICINE CLINIC | Facility: CLINIC | Age: 83
End: 2023-02-01

## 2023-02-01 VITALS — RESPIRATION RATE: 18 BRPM | DIASTOLIC BLOOD PRESSURE: 76 MMHG | HEART RATE: 80 BPM | SYSTOLIC BLOOD PRESSURE: 118 MMHG

## 2023-02-01 DIAGNOSIS — J43.1 PANLOBULAR EMPHYSEMA (HCC): Primary | ICD-10-CM

## 2023-02-01 DIAGNOSIS — G20 PRIMARY PARKINSONISM (HCC): ICD-10-CM

## 2023-02-01 DIAGNOSIS — I51.89 GRADE I DIASTOLIC DYSFUNCTION: ICD-10-CM

## 2023-02-01 DIAGNOSIS — R26.2 AMBULATORY DYSFUNCTION: ICD-10-CM

## 2023-02-01 RX ORDER — PREDNISONE 20 MG/1
40 TABLET ORAL DAILY
COMMUNITY
Start: 2023-02-01 | End: 2023-02-04

## 2023-02-01 NOTE — ASSESSMENT & PLAN NOTE
Patient is maintained on prednisone 40 mg daily for 5 days following recent hospitalization    He appears to be doing well with the prednisone boost   Albuterol inhaler to be used every 4 hours as needed

## 2023-02-01 NOTE — PROGRESS NOTES
Name: Lavern Guadalupe      : 1940      MRN: [id number]  Encounter Provider: Faiza Aponte MD  Encounter Date: 2023   Encounter department: [address] [place] [address]  Panlobular emphysema ([facility] )  Assessment & Plan:  Patient is maintained on prednisone 40 mg daily for 5 days following recent hospitalization  He appears to be doing well with the prednisone boost   Albuterol inhaler to be used every 4 hours as needed      2  Grade I diastolic dysfunction  Assessment & Plan:  According to echocardiogram during previous hospitalization      3  Primary parkinsonism [facility])  Assessment & Plan:  Patient is not currently on any parkinsonian medication due to orthostatic hypotension      4  Ambulatory dysfunction  Assessment & Plan:  Ambulates independently with a walker             Subjective      The patient is seen for Transition of Care Management following recent hospitalization at [facility] for shortness of breath  He was admitted briefly  Venous Dopplers were obtained due to increased peripheral edema  No DVT was encountered  Cardio pulmonary imaging was unremarkable  Patient was initiated on a prednisone pulse of 40 mg daily for 5 days  he was given a prescription for albuterol inhaler which he has been using periodically every day  Presently he feels well  He is ambulating to the dining room using his walker every day  He has a good appetite  He denies any problems with nausea or vomiting  He denies any dizziness  Review of Systems   Constitutional: Negative for activity change, appetite change, chills, diaphoresis, fatigue and fever  HENT: Negative  Eyes: Negative  Respiratory: Positive for shortness of breath (Exertion)  Cardiovascular: Positive for leg swelling (Peripheral edema)  Gastrointestinal: Negative  Endocrine: Negative  Genitourinary: Negative  Musculoskeletal: Negative  Skin: Negative  Neurological: Positive for tremors (Parkinsonian rigidity)  Hematological: Negative  Psychiatric/Behavioral: Negative  Current Outpatient Medications on File Prior to Visit   Medication Sig   • predniSONE 20 mg tablet Take 40 mg by mouth daily   • acetaminophen (TYLENOL) 325 mg tablet Take 2 tablets (650 mg total) by mouth every 6 (six) hours as needed for mild pain   • albuterol (PROVENTIL HFA,VENTOLIN HFA) 90 mcg/act inhaler Inhale 2 puffs every 4 (four) hours as needed for wheezing   • ALPRAZolam (XANAX) 0 5 mg tablet Take 1 tablet (0 5 mg total) by mouth 2 (two) times a day as needed for anxiety   • benzonatate (TESSALON) 200 MG capsule 1 CAP BY MOUTH THREE TIMES DAILY AS NEEDED   • bismuth subsalicylate (PEPTO BISMOL) 524 mg/30 mL oral suspension Take 15 mL (262 mg total) by mouth every 6 (six) hours as needed for indigestion Patient may self medicate and keep medication in his room  • budesonide (Pulmicort) 0 25 mg/2 mL nebulizer solution Take 2 mL (0 25 mg total) by nebulization 2 (two) times a day Rinse mouth after use     • ergocalciferol (VITAMIN D2) 50,000 units Take 50,000 Units by mouth once a week   • fluticasone (FLONASE) 50 mcg/act nasal spray 1 spray into each nostril daily   • formoterol (PERFOROMIST) 20 MCG/2ML nebulizer solution Take 20 mcg by nebulization every morning   • guaiFENesin (Mucinex) 600 mg 12 hr tablet Take 2 tablets (1,200 mg total) by mouth every 12 (twelve) hours   • hydrocortisone (ANUSOL-HC) 2 5 % rectal cream Apply 1 application topically as needed for hemorrhoids   • Misc Natural Products (URINOZINC PO) Take 2 tablets by mouth in the morning   • naproxen sodium (ALEVE) 220 MG tablet Take 1 tablet (220 mg total) by mouth 2 (two) times a day with meals   • omeprazole (PriLOSEC) 20 mg delayed release capsule Take 20 mg by mouth daily   • polyethylene glycol (GLYCOLAX) 17 GM/SCOOP powder Take 17 g by mouth in the morning (Patient not taking: Reported on 10/14/2022) • senna-docusate sodium (SENOKOT S) 8 6-50 mg per tablet 1 TAB BY MOUTH TWICE DAILY   • tiotropium (SPIRIVA RESPIMAT) 2 5 MCG/ACT AERS inhaler Inhale 2 puffs daily   • torsemide (DEMADEX) 20 mg tablet Take 1 tablet (20 mg total) by mouth daily     TCM Call     Date and time call was made  2/1/2023  9:01 AM    Hospital care reviewed  Records reviewed    Patient was hospitialized at  [facility]    Date of Admission  01/29/23    Date of discharge  01/31/23    Diagnosis  COPD    Disposition  Assisted Living      TCM Call     Scheduled for follow up? Yes    Do you need help managing your prescriptions or medications  Yes    Why type of assitance do you need  atria    Is transportation to your appointment needed  No    I have advised the patient to call PCP with any new or worsening symptoms  Rudy Fall          Objective     There were no vitals taken for this visit  Physical Exam  Vitals reviewed  Constitutional:       General: He is not in acute distress  Appearance: He is normal weight  He is not ill-appearing, toxic-appearing or diaphoretic  Comments: Parkinsonian habitus   HENT:      Head: Normocephalic  Right Ear: External ear normal       Left Ear: External ear normal       Mouth/Throat:      Mouth: Mucous membranes are moist    Eyes:      General: No scleral icterus  Conjunctiva/sclera: Conjunctivae normal       Pupils: Pupils are equal, round, and reactive to light  Neck:      Vascular: No JVD  Trachea: No tracheal deviation  Cardiovascular:      Rate and Rhythm: Normal rate and regular rhythm  Heart sounds: Normal heart sounds  No murmur heard  Pulmonary:      Effort: Pulmonary effort is normal  No respiratory distress  Comments: Managed breath sounds throughout both lung fields but no wheezing or rhonchi are noted  Air entry is good bilaterally  Abdominal:      General: Abdomen is flat  There is no distension  Tenderness: There is no abdominal tenderness  Musculoskeletal:      Cervical back: Neck supple  Right lower leg: No edema (1+ pretibial edema)  Left lower leg: No edema (1+ pretibial edema)  Skin:     General: Skin is warm  Coloration: Skin is not jaundiced  Findings: No bruising, erythema or rash  Neurological:      General: No focal deficit present  Mental Status: He is alert and oriented to person, place, and time  Mental status is at baseline  Comments: Parkinsonian features     Psychiatric:         Mood and Affect: Mood normal          Behavior: Behavior normal        Usama Pang MD

## 2023-02-23 ENCOUNTER — NURSING HOME VISIT (OUTPATIENT)
Dept: GERIATRICS | Facility: OTHER | Age: 83
End: 2023-02-23

## 2023-02-23 VITALS — DIASTOLIC BLOOD PRESSURE: 65 MMHG | RESPIRATION RATE: 18 BRPM | HEART RATE: 68 BPM | SYSTOLIC BLOOD PRESSURE: 130 MMHG

## 2023-02-23 DIAGNOSIS — R26.2 AMBULATORY DYSFUNCTION: ICD-10-CM

## 2023-02-23 DIAGNOSIS — J43.1 PANLOBULAR EMPHYSEMA (HCC): ICD-10-CM

## 2023-02-23 DIAGNOSIS — G20 PRIMARY PARKINSONISM (HCC): Primary | ICD-10-CM

## 2023-02-23 NOTE — ASSESSMENT & PLAN NOTE
Patient is not currently taking any Parkinson's medications    We will initiate Sinemet 10/101  At 8 am, 12noon, 4 pm and 8pm

## 2023-02-23 NOTE — ASSESSMENT & PLAN NOTE
Pulmonary status appears stable  He has good air entry bilaterally  No wheezing is appreciated    He has no congestion examination

## 2023-02-23 NOTE — PROGRESS NOTES
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[facility] )  Patient is not currently taking any Parkinson's medications  We will initiate Sinemet 10/101  At 8 am, 12noon, 4 pm and 8pm    COPD (chronic obstructive pulmonary disease) ([facility] )  Pulmonary status appears stable  He has good air entry bilaterally  No wheezing is appreciated  He has no congestion examination    Ambulatory dysfunction  Primarily due to his parkinsonism  Hopefully he will improve with initiation of Sinemet       Diagnoses and all orders for this visit:    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[facility] )  -     carbidopa-levodopa (SINEMET)  mg per tablet; 1 tablet at 8 AM, 12 noon, 4 PM and 8 PM daily    Panlobular emphysema ([facility])    Ambulatory dysfunction  -     carbidopa-levodopa (SINEMET)  mg per tablet; 1 tablet at 8 AM, 12 noon, 4 PM and 8 PM daily          Subjective:      Patient ID: Brandon Lawler is a 79 y o  male  The patient was seen on February 22, 2023 at [place] [address] in [place]  The patient was seen in his room the facility  Originally asked by nursing to see the patient because of increased shortness of breath however the patient is not complaining of any shortness of breath this morning  He complains of being stiff and having difficulty moving around  He does have a history of Parkinson's disease and he has not been on any Parkinson medication in quite some time  We will start Sinemet 10 /100 adjust according to his response  He has no complaints of chills or fever  He denies any increased cough or wheezing  He states his breathing is "fine "      Family History   Problem Relation Age of Onset   • Stomach cancer Mother    • Alzheimer's disease Father      Social History     Socioeconomic History   • Marital status:       Spouse name: Not on file   • Number of children: Not on file   • Years of education: Not on file   • Highest education level: Not on file   Occupational History   • Not on file   Tobacco Use   • Smoking status: Former     Packs/day: 0 50     Years: 50 00     Pack years: 25 00     Types: Cigarettes     Start date:      Quit date:      Years since quittin 1   • Smokeless tobacco: Never   Vaping Use   • Vaping Use: Never used   Substance and Sexual Activity   • Alcohol use: Not Currently   • Drug use: Never   • Sexual activity: Not on file   Other Topics Concern   • Not on file   Social History Narrative   • Not on file     Social Determinants of Health     Financial Resource Strain: Not on file   Food Insecurity: Not on file   Transportation Needs: Not on file   Physical Activity: Not on file   Stress: Not on file   Social Connections: Not on file   Intimate Partner Violence: Not on file   Housing Stability: Not on file     Past Medical History:   Diagnosis Date   • BPH (benign prostatic hyperplasia)    • COPD (chronic obstructive pulmonary disease) ([facility] )    • Crohn's disease ([facility] )    • Depression    • Restless legs syndrome        Current Outpatient Medications:   •  carbidopa-levodopa (SINEMET)  mg per tablet, 1 tablet at 8 AM, 12 noon, 4 PM and 8 PM daily, Disp: 120 tablet, Rfl: 5  •  acetaminophen (TYLENOL) 325 mg tablet, Take 2 tablets (650 mg total) by mouth every 6 (six) hours as needed for mild pain, Disp: 80 tablet, Rfl: 0  •  albuterol (PROVENTIL HFA,VENTOLIN HFA) 90 mcg/act inhaler, Inhale 2 puffs every 4 (four) hours as needed for wheezing, Disp: 18 g, Rfl: 5  •  ALPRAZolam (XANAX) 0 5 mg tablet, Take 1 tablet (0 5 mg total) by mouth 2 (two) times a day as needed for anxiety, Disp: 60 tablet, Rfl: 1  •  benzonatate (TESSALON) 200 MG capsule, 1 CAP BY MOUTH THREE TIMES DAILY AS NEEDED, Disp: , Rfl:   •  bismuth subsalicylate (PEPTO BISMOL) 524 mg/30 mL oral suspension, Take 15 mL (262 mg total) by mouth every 6 (six) hours as needed for indigestion Patient may self medicate and keep medication in his room  , Disp: 360 mL, Rfl: 0  •  budesonide (Pulmicort) 0 25 mg/2 mL nebulizer solution, Take 2 mL (0 25 mg total) by nebulization 2 (two) times a day Rinse mouth after use , Disp: 120 mL, Rfl: 2  •  ergocalciferol (VITAMIN D2) 50,000 units, Take 50,000 Units by mouth once a week, Disp: , Rfl:   •  fluticasone (FLONASE) 50 mcg/act nasal spray, 1 spray into each nostril daily, Disp: , Rfl:   •  formoterol (PERFOROMIST) 20 MCG/2ML nebulizer solution, Take 20 mcg by nebulization every morning, Disp: , Rfl:   •  guaiFENesin (Mucinex) 600 mg 12 hr tablet, Take 2 tablets (1,200 mg total) by mouth every 12 (twelve) hours, Disp: 40 tablet, Rfl: 1  •  hydrocortisone (ANUSOL-HC) 2 5 % rectal cream, Apply 1 application topically as needed for hemorrhoids, Disp: 28 g, Rfl: 5  •  Misc Natural Products (URINOZINC PO), Take 2 tablets by mouth in the morning, Disp: , Rfl:   •  naproxen sodium (ALEVE) 220 MG tablet, Take 1 tablet (220 mg total) by mouth 2 (two) times a day with meals, Disp: 60 tablet, Rfl: 5  •  omeprazole (PriLOSEC) 20 mg delayed release capsule, Take 20 mg by mouth daily, Disp: , Rfl:   •  polyethylene glycol (GLYCOLAX) 17 GM/SCOOP powder, Take 17 g by mouth in the morning (Patient not taking: Reported on 10/14/2022), Disp: , Rfl:   •  senna-docusate sodium (SENOKOT S) 8 6-50 mg per tablet, 1 TAB BY MOUTH TWICE DAILY, Disp: , Rfl:   •  tiotropium (SPIRIVA RESPIMAT) 2 5 MCG/ACT AERS inhaler, Inhale 2 puffs daily, Disp: , Rfl:   •  torsemide (DEMADEX) 20 mg tablet, Take 1 tablet (20 mg total) by mouth daily, Disp: 30 tablet, Rfl: 5  No Known Allergies  Past Surgical History:   Procedure Laterality Date   • RECTAL SURGERY      abscess removed         Review of Systems   Constitutional: Positive for activity change (Limited physical activity)  Negative for appetite change, chills, diaphoresis, fatigue and fever  HENT: Negative  Eyes: Negative  Respiratory: Positive for cough (Occasionally) and shortness of breath (With exertion)  Negative for choking, chest tightness, wheezing and stridor  Cardiovascular: Negative  Gastrointestinal: Negative  Endocrine: Negative  Genitourinary: Negative  Musculoskeletal: Negative  Neurological: Positive for tremors (Parkinsonian) and weakness  Parkinsonian body habitus and facies  Monotone, soft voice  Psychiatric/Behavioral: Negative  Objective:      /65 (BP Location: Right arm, Patient Position: Sitting, Cuff Size: Standard)   Pulse 68   Resp 18          Physical Exam  Vitals reviewed  Constitutional:       General: He is not in acute distress  Appearance: He is normal weight  He is not ill-appearing, toxic-appearing or diaphoretic  HENT:      Head: Normocephalic and atraumatic  Right Ear: External ear normal       Left Ear: External ear normal       Nose: Nose normal       Mouth/Throat:      Mouth: Mucous membranes are moist       Pharynx: Oropharynx is clear  Eyes:      General: No scleral icterus  Conjunctiva/sclera: Conjunctivae normal       Pupils: Pupils are equal, round, and reactive to light  Cardiovascular:      Rate and Rhythm: Normal rate and regular rhythm  Heart sounds: Normal heart sounds  No murmur heard  Pulmonary:      Effort: Pulmonary effort is normal  No respiratory distress  Breath sounds: No stridor  No wheezing, rhonchi or rales  Comments: Diminished breath sounds bilaterally but clear without wheezing or rhonchi  Abdominal:      General: Abdomen is flat  There is no distension  Tenderness: There is no abdominal tenderness  Musculoskeletal:      Cervical back: Neck supple  No rigidity or tenderness  Right lower leg: No edema  Left lower leg: No edema  Skin:     General: Skin is warm  Coloration: Skin is not jaundiced  Findings: No bruising, erythema or rash  Neurological:      General: No focal deficit present  Mental Status: He is alert and oriented to person, place, and time  Mental status is at baseline        Cranial Nerves: No cranial nerve deficit  Coordination: Coordination abnormal (Secondary to parkinsonian rigidity and habitus)  Gait: Gait abnormal (Shuffling, parkinsonian)  Psychiatric:         Mood and Affect: Mood normal          Behavior: Behavior normal          Thought Content:  Thought content normal          Judgment: Judgment normal

## 2023-03-13 DIAGNOSIS — F41.9 ANXIETY: ICD-10-CM

## 2023-03-13 RX ORDER — ALPRAZOLAM 0.5 MG/1
0.5 TABLET ORAL 2 TIMES DAILY PRN
Qty: 60 TABLET | Refills: 1 | Status: SHIPPED | OUTPATIENT
Start: 2023-03-13

## 2023-03-13 NOTE — TELEPHONE ENCOUNTER
[facility]      Please do PDMP and forward to provider bin for review/approval

## 2023-04-05 ENCOUNTER — NURSING HOME VISIT (OUTPATIENT)
Dept: GERIATRICS | Facility: OTHER | Age: 83
End: 2023-04-05

## 2023-04-05 VITALS
HEART RATE: 72 BPM | HEIGHT: 70 IN | WEIGHT: 191 LBS | BODY MASS INDEX: 27.35 KG/M2 | DIASTOLIC BLOOD PRESSURE: 66 MMHG | SYSTOLIC BLOOD PRESSURE: 116 MMHG | RESPIRATION RATE: 16 BRPM

## 2023-04-05 DIAGNOSIS — R60.9 PERIPHERAL EDEMA: ICD-10-CM

## 2023-04-05 DIAGNOSIS — J43.1 PANLOBULAR EMPHYSEMA (HCC): ICD-10-CM

## 2023-04-05 DIAGNOSIS — I51.89 GRADE I DIASTOLIC DYSFUNCTION: ICD-10-CM

## 2023-04-05 DIAGNOSIS — G20 PRIMARY PARKINSONISM (HCC): Primary | ICD-10-CM

## 2023-04-05 RX ORDER — TORSEMIDE 10 MG/1
TABLET ORAL
Qty: 7 TABLET | Refills: 0 | Status: SHIPPED | OUTPATIENT
Start: 2023-04-05

## 2023-04-05 NOTE — ASSESSMENT & PLAN NOTE
Possibly some very mild early CHF  Bilateral basilar Rales are noted    We will provide a transient increase in diuretic dosage for 1 week

## 2023-04-05 NOTE — PROGRESS NOTES
"Assessment/Plan:    COPD (chronic obstructive pulmonary disease) ([facility])  Clinically stable  No wheezing or rhonchi  Grade I diastolic dysfunction  Possibly some very mild early CHF  Bilateral basilar Rales are noted  We will provide a transient increase in diuretic dosage for 1 week    Peripheral edema  Edema is well controlled  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[address] )  Able  Patient may benefit by an adjustment in his levodopa        Subjective:      Patient ID: Yrn Blandon is a 79 y o  male  The patient is evaluated at [facility]  patient was concerned because of some increased chest congestion  He has not experienced any increase in peripheral edema  He denies wheezing or increased coughing  He has had no chills or fever  He denies palpitations and he has had no chest pain  His appetite has been pretty good  Has no complaints of orthopnea or PND  Review of Systems   Constitutional: Negative  HENT: Negative  Respiratory: Positive for cough and shortness of breath  Negative for choking, chest tightness, wheezing and stridor  Cardiovascular: Negative for chest pain, palpitations and leg swelling  Gastrointestinal: Negative  Endocrine: Negative  Genitourinary: Negative  Musculoskeletal: Negative  Skin: Negative  Neurological: Positive for tremors  Flat affect, parkinsonian posturing resting tremor monotone quality of voice   Psychiatric/Behavioral: Negative  Objective:      /66 (BP Location: Right arm, Patient Position: Sitting, Cuff Size: Standard)   Pulse 72   Resp 16   Ht 5' 9 5\" (1 765 m)   Wt 86 6 kg (191 lb)   BMI 27 80 kg/m²          Physical Exam  Vitals reviewed  Constitutional:       General: He is not in acute distress  Appearance: Normal appearance  He is not ill-appearing, toxic-appearing or diaphoretic  HENT:      Head: Normocephalic and atraumatic        Right Ear: External ear normal       Left Ear: External ear normal    Eyes: " General: No scleral icterus  Conjunctiva/sclera: Conjunctivae normal       Pupils: Pupils are equal, round, and reactive to light  Cardiovascular:      Rate and Rhythm: Normal rate and regular rhythm  Pulses: Normal pulses  Heart sounds: Normal heart sounds  Pulmonary:      Effort: Pulmonary effort is normal  No respiratory distress  Breath sounds: Rales (Bibasilar rales with diminished breath sounds at bases) present  No wheezing or rhonchi  Abdominal:      General: Abdomen is flat  There is no distension  Musculoskeletal:      Cervical back: Neck supple  Right lower leg: No edema  Left lower leg: No edema  Skin:     General: Skin is warm  Coloration: Skin is not jaundiced  Findings: No bruising, erythema or rash  Neurological:      General: No focal deficit present  Mental Status: He is alert and oriented to person, place, and time  Mental status is at baseline  Gait: Gait abnormal (Parkinsonian)     Psychiatric:         Mood and Affect: Mood normal          Behavior: Behavior normal

## 2023-04-26 DIAGNOSIS — L85.3 DRY SKIN DERMATITIS: Primary | ICD-10-CM

## 2023-04-26 RX ORDER — MENTHOL AND ZINC OXIDE .44; 20.625 G/100G; G/100G
OINTMENT TOPICAL 2 TIMES DAILY
Qty: 113 G | Refills: 2 | Status: SHIPPED | OUTPATIENT
Start: 2023-04-26

## 2023-06-14 RX ORDER — ALBUTEROL SULFATE 90 UG/1
2 AEROSOL, METERED RESPIRATORY (INHALATION) EVERY 4 HOURS PRN
COMMUNITY
Start: 2023-03-17

## 2023-06-15 ENCOUNTER — OFFICE VISIT (OUTPATIENT)
Dept: INTERNAL MEDICINE CLINIC | Facility: CLINIC | Age: 83
End: 2023-06-15
Payer: MEDICARE

## 2023-06-15 VITALS
HEART RATE: 90 BPM | WEIGHT: 171.6 LBS | TEMPERATURE: 98.2 F | HEIGHT: 70 IN | OXYGEN SATURATION: 95 % | BODY MASS INDEX: 24.57 KG/M2 | SYSTOLIC BLOOD PRESSURE: 104 MMHG | DIASTOLIC BLOOD PRESSURE: 64 MMHG

## 2023-06-15 DIAGNOSIS — J43.2 CENTRILOBULAR EMPHYSEMA (HCC): ICD-10-CM

## 2023-06-15 DIAGNOSIS — K62.89 ANAL SPHINCTER INCOMPETENCE: ICD-10-CM

## 2023-06-15 DIAGNOSIS — R60.9 PERIPHERAL EDEMA: ICD-10-CM

## 2023-06-15 DIAGNOSIS — Z00.00 MEDICARE ANNUAL WELLNESS VISIT, SUBSEQUENT: ICD-10-CM

## 2023-06-15 DIAGNOSIS — R91.1 LUNG NODULE: ICD-10-CM

## 2023-06-15 DIAGNOSIS — G20 PRIMARY PARKINSONISM (HCC): Primary | ICD-10-CM

## 2023-06-15 DIAGNOSIS — R26.2 AMBULATORY DYSFUNCTION: ICD-10-CM

## 2023-06-15 PROCEDURE — 99214 OFFICE O/P EST MOD 30 MIN: CPT | Performed by: INTERNAL MEDICINE

## 2023-06-15 PROCEDURE — G0439 PPPS, SUBSEQ VISIT: HCPCS | Performed by: INTERNAL MEDICINE

## 2023-06-15 RX ORDER — POTASSIUM CHLORIDE 750 MG/1
10 TABLET, FILM COATED, EXTENDED RELEASE ORAL DAILY
COMMUNITY

## 2023-06-15 NOTE — PROGRESS NOTES
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     To increase his Sinemet to 25/100 3 times daily         Relevant Medications    carbidopa-levodopa (Sinemet)  mg per tablet       Other    Anal sphincter incompetence     No major issues  Patient is using bulk forming agents with good results         Ambulatory dysfunction     Ambulatory dysfunction consequent to Parkinson's disease  Peripheral edema     Referral edema has resolved  We will discontinue torsemide  Medicare annual wellness visit, subsequent     Patient is up-to-date with age-appropriate screenings and immunizations other than tetanus             Preventive health issues were discussed with patient, and age appropriate screening tests were ordered as noted in patient's After Visit Summary  Personalized health advice and appropriate referrals for health education or preventive services given if needed, as noted in patient's After Visit Summary  History of Present Illness:     Patient presents for a Medicare Wellness Visit    Patient presents to the office for a Medicare wellness visit along with a DME exam for his assisted living facility  He is doing reasonably well  His mobility has been improved with the use of Sinemet  His COPD has been stable for several months now  His appetite is good  He has some issues with his finances  He has sufficient funds that will sustain him for another year in his assisted living facility but after that his resources will be exhausted       Patient Care Team:  Sheridan Seay MD as PCP - General (Internal Medicine)     Review of Systems:     Review of Systems   Constitutional: Negative  HENT: Negative  Eyes: Negative  Respiratory: Negative  Cardiovascular: Negative  Gastrointestinal: Negative  Endocrine: Negative  Genitourinary: Negative  Musculoskeletal: Negative  Neurological: Positive for tremors (Parkinsonism) and weakness  Hematological: Negative  Psychiatric/Behavioral: Negative  Problem List:     Patient Active Problem List   Diagnosis   • Anxiety   • Former smoker   • Pulmonary emphysema ([place] [address] )   • Anal sphincter incompetence   • Ambulatory dysfunction   • COPD (chronic obstructive pulmonary disease) (HCC)   • Depression   • Hemorrhoids   • BPH (benign prostatic hyperplasia)   • 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[facility] )   • Peripheral edema   • Venous insufficiency (chronic) (peripheral)   • Grade I diastolic dysfunction   • Lung nodule   • Medicare annual wellness visit, subsequent      Past Medical and Surgical History:     Past Medical History:   Diagnosis Date   • BPH (benign prostatic hyperplasia)    • COPD (chronic obstructive pulmonary disease) ([facility] )    • Crohn's disease ([place] [address] )    • Depression    • Restless legs syndrome      Past Surgical History:   Procedure Laterality Date   • RECTAL SURGERY      abscess removed      Family History:     Family History   Problem Relation Age of Onset   • Stomach cancer Mother    • Alzheimer's disease Father       Social History:     Social History     Socioeconomic History   • Marital status:       Spouse name: None   • Number of children: None   • Years of education: None   • Highest education level: None   Occupational History   • None   Tobacco Use   • Smoking status: Former     Packs/day: 0 50     Years: 50 00     Total pack years: 25 00     Types: Cigarettes     Start date: 64     Quit date:      Years since quittin 4   • Smokeless tobacco: Never   Vaping Use • Vaping Use: Never used   Substance and Sexual Activity   • Alcohol use: Not Currently   • Drug use: Never   • Sexual activity: None   Other Topics Concern   • None   Social History Narrative   • None     Social Determinants of Health     Financial Resource Strain: Low Risk  (6/15/2023)    Overall Financial Resource Strain (CARDIA)    • Difficulty of Paying Living Expenses: Not very hard   Food Insecurity: Not on file   Transportation Needs: No Transportation Needs (6/15/2023)    PRAPARE - Transportation    • Lack of Transportation (Medical): No    • Lack of Transportation (Non-Medical): No   Physical Activity: Not on file   Stress: Not on file   Social Connections: Not on file   Intimate Partner Violence: Not on file   Housing Stability: Not on file      Medications and Allergies:     Current Outpatient Medications   Medication Sig Dispense Refill   • acetaminophen (TYLENOL) 325 mg tablet Take 2 tablets (650 mg total) by mouth every 6 (six) hours as needed for mild pain 80 tablet 0   • albuterol (PROVENTIL HFA,VENTOLIN HFA) 90 mcg/act inhaler Inhale 2 puffs every 4 (four) hours as needed     • ALPRAZolam (XANAX) 0 5 mg tablet Take 1 tablet (0 5 mg total) by mouth 2 (two) times a day as needed for anxiety 60 tablet 1   • benzonatate (TESSALON) 200 MG capsule 1 CAP BY MOUTH THREE TIMES DAILY AS NEEDED     • bismuth subsalicylate (PEPTO BISMOL) 524 mg/30 mL oral suspension Take 15 mL (262 mg total) by mouth every 6 (six) hours as needed for indigestion Patient may self medicate and keep medication in his room   360 mL 0   • carbidopa-levodopa (Sinemet)  mg per tablet Take 1 tablet by mouth 3 (three) times a day 90 tablet 5   • ergocalciferol (VITAMIN D2) 50,000 units Take 50,000 Units by mouth once a week     • fluticasone (FLONASE) 50 mcg/act nasal spray 1 spray into each nostril daily     • formoterol (PERFOROMIST) 20 MCG/2ML nebulizer solution Take 20 mcg by nebulization every 12 (twelve) hours     • guaiFENesin (Mucinex) 600 mg 12 hr tablet Take 2 tablets (1,200 mg total) by mouth every 12 (twelve) hours 40 tablet 1   • hydrocortisone (ANUSOL-HC) 2 5 % rectal cream Apply 1 application topically as needed for hemorrhoids 28 g 5   • menthol-zinc oxide (CALMOSPETINE) 0 44-20 625 % Apply topically 2 (two) times a day 113 g 2   • Misc Natural Products (URINOZINC PO) Take 2 tablets by mouth in the morning     • naproxen sodium (ALEVE) 220 MG tablet Take 1 tablet (220 mg total) by mouth 2 (two) times a day with meals 60 tablet 5   • omeprazole (PriLOSEC) 20 mg delayed release capsule Take 20 mg by mouth daily     • polyethylene glycol (GLYCOLAX) 17 GM/SCOOP powder Take 17 g by mouth daily as needed     • potassium chloride (Klor-Con) 10 mEq tablet Take 10 mEq by mouth in the morning     • senna-docusate sodium (SENOKOT S) 8 6-50 mg per tablet 1 TAB BY MOUTH TWICE DAILY     • tiotropium (SPIRIVA RESPIMAT) 2 5 MCG/ACT AERS inhaler Inhale 2 puffs daily     • torsemide (DEMADEX) 20 mg tablet Take 1 tablet (20 mg total) by mouth daily 30 tablet 5   • budesonide (Pulmicort) 0 25 mg/2 mL nebulizer solution Take 2 mL (0 25 mg total) by nebulization 2 (two) times a day Rinse mouth after use  120 mL 2     No current facility-administered medications for this visit  No Known Allergies   Immunizations:     Immunization History   Administered Date(s) Administered   • COVID-19 MODERNA VACC 0 5 ML IM 03/16/2021, 04/13/2021, 08/02/2022   • INFLUENZA 10/14/2022   • Influenza Split High Dose Preservative Free IM 10/05/2017, 02/10/2020   • Influenza Whole 10/01/2019, 09/29/2020   • Influenza, high dose seasonal 0 7 mL 10/31/2016, 09/21/2020, 10/14/2022   • Pneumococcal Conjugate 13-Valent 10/16/2015   • Pneumococcal Polysaccharide PPV23 10/24/2014   • Tuberculin Skin Test-PPD Intradermal 08/24/2010, 09/07/2010      Health Maintenance: There are no preventive care reminders to display for this patient        Topic Date Due   • COVID-19 Vaccine (4 - Moderna series) 09/27/2022      Medicare Screening Tests and Risk Assessments:     Patricia Lee is here for his Subsequent Wellness visit  Health Risk Assessment:   Patient rates overall health as good  Patient feels that their physical health rating is same  Patient is satisfied with their life  Eyesight was rated as same  Hearing was rated as same  Patient feels that their emotional and mental health rating is slightly worse  Patients states they are sometimes angry  Patient states they are sometimes unusually tired/fatigued  Patient states that he has experienced weight loss or gain in last 6 months  Depression Screening:   PHQ-9 Score: 1      Fall Risk Screening: In the past year, patient has experienced: no history of falling in past year      Home Safety:  Patient does not have trouble with stairs inside or outside of their home  Patient has working smoke alarms and has working carbon monoxide detector  Home safety hazards include: none  Nutrition:   Current diet is Regular  Medications:   Patient is currently taking over-the-counter supplements  OTC medications include: see medication list  Patient is able to manage medications  Activities of Daily Living (ADLs)/Instrumental Activities of Daily Living (IADLs):   Walk and transfer into and out of bed and chair?: Yes  Dress and groom yourself?: Yes    Bathe or shower yourself?: Yes    Feed yourself? Yes  Do your laundry/housekeeping?: No  Manage your money, pay your bills and track your expenses?: No  Make your own meals?: No    Do your own shopping?: No    Durable Medical Equipment Suppliers  no    Previous Hospitalizations:   Any hospitalizations or ED visits within the last 12 months?: Yes      Advance Care Planning:   Living will: No    Durable POA for healthcare:  Yes    Advanced directive: No    Advanced directive counseling given: Yes    Five wishes given: No    Patient declined ACP directive: Yes    End of Life "Decisions reviewed with patient: Yes    Provider agrees with end of life decisions: Yes      Cognitive Screening:   Provider or family/friend/caregiver concerned regarding cognition?: No    PREVENTIVE SCREENINGS      Cardiovascular Screening:    General: Screening Current and Screening Not Indicated      Diabetes Screening:     General: Screening Current      Colorectal Cancer Screening:     General: Screening Not Indicated      Prostate Cancer Screening:    General: Screening Not Indicated      Osteoporosis Screening:    General: Screening Not Indicated      Abdominal Aortic Aneurysm (AAA) Screening:    Risk factors include: tobacco use        General: Screening Not Indicated      Lung Cancer Screening:     General: Screening Not Indicated      Hepatitis C Screening:    General: Screening Not Indicated    Screening, Brief Intervention, and Referral to Treatment (SBIRT)    Screening  Typical number of drinks in a day: 0  Typical number of drinks in a week: 0  Interpretation: Low risk drinking behavior  Single Item Drug Screening:  How often have you used an illegal drug (including marijuana) or a prescription medication for non-medical reasons in the past year? never    Single Item Drug Screen Score: 0  Interpretation: Negative screen for possible drug use disorder    No results found  Physical Exam:     /64 (BP Location: Left arm, Patient Position: Sitting, Cuff Size: Standard)   Pulse 90   Temp 98 2 °F (36 8 °C) (Tympanic)   Ht 5' 9 5\" (1 765 m)   Wt 77 8 kg (171 lb 9 6 oz)   SpO2 95%   BMI 24 98 kg/m²     Physical Exam  Vitals reviewed  Constitutional:       General: He is not in acute distress  Appearance: He is well-developed and normal weight  He is not toxic-appearing  HENT:      Head: Normocephalic and atraumatic  Right Ear: Tympanic membrane, ear canal and external ear normal  There is no impacted cerumen        Left Ear: Tympanic membrane, ear canal and external ear normal  " There is no impacted cerumen  Nose: Nose normal       Mouth/Throat:      Mouth: Mucous membranes are moist       Pharynx: Oropharynx is clear  Eyes:      General: No scleral icterus  Conjunctiva/sclera: Conjunctivae normal       Pupils: Pupils are equal, round, and reactive to light  Neck:      Vascular: No carotid bruit  Cardiovascular:      Rate and Rhythm: Normal rate and regular rhythm  Pulses: Normal pulses  Heart sounds: Normal heart sounds  No murmur heard  Pulmonary:      Effort: Pulmonary effort is normal  No respiratory distress  Breath sounds: Normal breath sounds  Abdominal:      General: Abdomen is flat  Bowel sounds are normal  There is no distension  Palpations: Abdomen is soft  There is no mass  Tenderness: There is no abdominal tenderness  There is no right CVA tenderness, left CVA tenderness, guarding or rebound  Hernia: No hernia is present  Musculoskeletal:         General: No swelling, tenderness or deformity  Normal range of motion  Cervical back: Normal range of motion and neck supple  Right lower leg: No edema  Left lower leg: No edema  Skin:     General: Skin is warm and dry  Capillary Refill: Capillary refill takes less than 2 seconds  Coloration: Skin is not jaundiced  Findings: No bruising, erythema or rash  Neurological:      Mental Status: He is alert and oriented to person, place, and time  Mental status is at baseline  Gait: Gait abnormal (Parkinsonian)  Psychiatric:         Mood and Affect: Mood normal          Behavior: Behavior normal          Thought Content:  Thought content normal          Judgment: Judgment normal           Suleman Garcia MD

## 2023-06-27 DIAGNOSIS — B37.2 CANDIDAL INTERTRIGO: Primary | ICD-10-CM

## 2023-06-27 RX ORDER — NYSTATIN 100000 [USP'U]/G
POWDER TOPICAL 2 TIMES DAILY
Qty: 60 G | Refills: 0 | Status: SHIPPED | OUTPATIENT
Start: 2023-06-27 | End: 2023-06-28 | Stop reason: ALTCHOICE

## 2023-06-28 DIAGNOSIS — B37.2 CANDIDAL INTERTRIGO: Primary | ICD-10-CM

## 2023-06-28 RX ORDER — NYSTATIN 100000 U/G
CREAM TOPICAL 2 TIMES DAILY
Qty: 30 G | Refills: 1 | Status: SHIPPED | OUTPATIENT
Start: 2023-06-28

## 2023-06-30 DIAGNOSIS — H10.503 BLEPHAROCONJUNCTIVITIS OF BOTH EYES, UNSPECIFIED BLEPHAROCONJUNCTIVITIS TYPE: Primary | ICD-10-CM

## 2023-06-30 RX ORDER — TOBRAMYCIN 3 MG/ML
2 SOLUTION/ DROPS OPHTHALMIC
Qty: 5 ML | Refills: 1 | Status: SHIPPED | OUTPATIENT
Start: 2023-06-30 | End: 2023-07-06 | Stop reason: ALTCHOICE

## 2023-07-06 DIAGNOSIS — H10.33 ACUTE CONJUNCTIVITIS OF BOTH EYES, UNSPECIFIED ACUTE CONJUNCTIVITIS TYPE: Primary | ICD-10-CM

## 2023-07-06 RX ORDER — CIPROFLOXACIN HYDROCHLORIDE 3.5 MG/ML
1 SOLUTION/ DROPS TOPICAL
Qty: 10 ML | Refills: 0 | Status: SHIPPED | OUTPATIENT
Start: 2023-07-06

## 2023-07-19 ENCOUNTER — NURSING HOME VISIT (OUTPATIENT)
Dept: GERIATRICS | Facility: OTHER | Age: 83
End: 2023-07-19
Payer: MEDICARE

## 2023-07-19 VITALS
RESPIRATION RATE: 16 BRPM | BODY MASS INDEX: 24.48 KG/M2 | HEART RATE: 64 BPM | DIASTOLIC BLOOD PRESSURE: 60 MMHG | SYSTOLIC BLOOD PRESSURE: 110 MMHG | HEIGHT: 70 IN | WEIGHT: 171 LBS

## 2023-07-19 DIAGNOSIS — J43.1 PANLOBULAR EMPHYSEMA (HCC): Primary | ICD-10-CM

## 2023-07-19 DIAGNOSIS — G20 PRIMARY PARKINSONISM (HCC): ICD-10-CM

## 2023-07-19 DIAGNOSIS — R11.0 NAUSEA: ICD-10-CM

## 2023-07-19 PROCEDURE — 99347 HOME/RES VST EST SF MDM 20: CPT | Performed by: INTERNAL MEDICINE

## 2023-07-19 NOTE — ASSESSMENT & PLAN NOTE
Clinically stable. He did not take his medications this morning because of nausea.   Patient was encouraged to take his medication when he felt better

## 2023-07-19 NOTE — PROGRESS NOTES
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[address])  Clinically stable. He did not take his medications this morning because of nausea. Patient was encouraged to take his medication when he felt better    COPD (chronic obstructive pulmonary disease) ([facility])  Stable and nicely compensated at this time    Nausea  No vomiting. Patient feels nauseous and does not have an appetite. Patient has benign. Symptoms are slightly improved with Pepto-Bismol that he is currently taking omeprazole 20 mg daily. If symptoms do not improve by mid afternoon we will call with some medication for the patient. Continue to monitor          Subjective:      Patient ID: Ana Rosa Moreno is a 79 y.o. male. The patient is seen in his room at [address] [employer] in [facility]. Is been experiencing nausea most of the morning. He is not vomiting. He has no fever. He denies chills. He has had no diarrhea or discomfort. He denies any diaphoresis. Has no abdominal pain he believes he may have eaten of stale bread earlier. .          Review of Systems   Constitutional: Negative. HENT: Negative. Eyes: Negative. Respiratory: Negative. Cardiovascular: Negative. Gastrointestinal: Positive for nausea. Negative for abdominal distention, abdominal pain, anal bleeding, blood in stool, constipation, diarrhea, rectal pain and vomiting. Endocrine: Negative. Genitourinary: Negative. Musculoskeletal: Negative. Skin: Negative. Allergic/Immunologic: Negative. Neurological: Negative. Hematological: Negative. Psychiatric/Behavioral: Negative. Objective:      /60 (BP Location: Right arm, Patient Position: Supine, Cuff Size: Standard)   Pulse 64   Resp 16   Ht 5' 9.5" (1.765 m)   Wt 77.6 kg (171 lb)   BMI 24.89 kg/m²          Physical Exam  Vitals reviewed. Constitutional:       Appearance: Normal appearance. He is normal weight. HENT:      Head: Normocephalic and atraumatic.       Right Ear: External ear normal.      Left Ear: External ear normal.   Eyes:      General: No scleral icterus. Conjunctiva/sclera: Conjunctivae normal.      Pupils: Pupils are equal, round, and reactive to light. Cardiovascular:      Rate and Rhythm: Normal rate and regular rhythm. Heart sounds: Normal heart sounds. Pulmonary:      Effort: Pulmonary effort is normal. No respiratory distress. Breath sounds: Normal breath sounds. Abdominal:      General: Abdomen is flat. Bowel sounds are normal. There is no distension. Palpations: There is no mass. Tenderness: There is no abdominal tenderness. There is no guarding. Musculoskeletal:      Cervical back: Neck supple. Right lower leg: No edema. Left lower leg: No edema. Lymphadenopathy:      Cervical: No cervical adenopathy. Skin:     Coloration: Skin is not jaundiced. Findings: No bruising, erythema or rash. Neurological:      General: No focal deficit present. Mental Status: He is alert and oriented to person, place, and time. Mental status is at baseline. Coordination: Coordination normal.      Gait: Gait abnormal (Parkinsonian).    Psychiatric:         Mood and Affect: Mood normal.         Behavior: Behavior normal.

## 2023-07-19 NOTE — ASSESSMENT & PLAN NOTE
No vomiting. Patient feels nauseous and does not have an appetite. Patient has benign. Symptoms are slightly improved with Pepto-Bismol that he is currently taking omeprazole 20 mg daily. If symptoms do not improve by mid afternoon we will call with some medication for the patient.   Continue to monitor

## 2023-07-24 DIAGNOSIS — R05.1 ACUTE COUGH: Primary | ICD-10-CM

## 2023-07-24 RX ORDER — GUAIFENESIN 200 MG/10ML
200 LIQUID ORAL 3 TIMES DAILY PRN
Qty: 236 ML | Refills: 3 | Status: SHIPPED | OUTPATIENT
Start: 2023-07-24

## 2023-07-26 ENCOUNTER — NURSING HOME VISIT (OUTPATIENT)
Dept: GERIATRICS | Facility: OTHER | Age: 83
End: 2023-07-26

## 2023-07-26 VITALS
DIASTOLIC BLOOD PRESSURE: 80 MMHG | HEART RATE: 80 BPM | OXYGEN SATURATION: 96 % | WEIGHT: 171 LBS | HEIGHT: 70 IN | RESPIRATION RATE: 18 BRPM | BODY MASS INDEX: 24.48 KG/M2 | SYSTOLIC BLOOD PRESSURE: 154 MMHG

## 2023-07-26 DIAGNOSIS — R60.9 PERIPHERAL EDEMA: ICD-10-CM

## 2023-07-26 DIAGNOSIS — J43.1 PANLOBULAR EMPHYSEMA (HCC): Primary | ICD-10-CM

## 2023-07-26 RX ORDER — ALBUTEROL SULFATE 90 UG/1
2 AEROSOL, METERED RESPIRATORY (INHALATION) EVERY 4 HOURS PRN
Qty: 18 G | Refills: 5 | Status: SHIPPED | OUTPATIENT
Start: 2023-07-26

## 2023-07-26 NOTE — ASSESSMENT & PLAN NOTE
Pulmonary status is essentially stable. No wheezing or rhonchi noted on examination. His inhaler was renewed.   Oxygen flow was increased to 2-1/2 L

## 2023-07-26 NOTE — ASSESSMENT & PLAN NOTE
Peripheral edema appears stable and well-controlled.   We will recheck some chemistries as patient has not had any blood work done in 6 months

## 2023-07-26 NOTE — PROGRESS NOTES
Assessment/Plan:    COPD (chronic obstructive pulmonary disease) ([address])  Pulmonary status is essentially stable. No wheezing or rhonchi noted on examination. His inhaler was renewed. Oxygen flow was increased to 2-1/2 L    Peripheral edema  Peripheral edema appears stable and well-controlled. We will recheck some chemistries as patient has not had any blood work done in 6 months             Subjective:      Patient ID: Chelsea Herrera is a 79 y.o. male. The patient is seen at [facility] complains of some shortness of breath. The patient states that he does not think his oxygen is sufficient. He is currently on 2 L/min. Pulse oximetry reveals his O2 sat to be between 90 and 92%. While evaluating the patient his oxygen flow was increased to 2-1/2 L his oxygen saturation actually netta to 96 to 97%. He denies any increased cough or wheezing he has been using his inhaler a little bit more often than usual.              Review of Systems   Constitutional: Negative. HENT: Negative. Eyes: Negative. Respiratory: Positive for shortness of breath. Negative for cough, choking, chest tightness, wheezing and stridor. Cardiovascular: Positive for leg swelling (Mild). Gastrointestinal: Negative. Endocrine: Positive for polyphagia. Musculoskeletal: Negative. Skin: Negative. Allergic/Immunologic: Negative. Neurological: Negative. Hematological: Negative. Psychiatric/Behavioral: Negative. Objective:      /80 (BP Location: Left arm, Patient Position: Sitting, Cuff Size: Standard)   Pulse 80   Resp 18   Ht 5' 10" (1.778 m)   Wt 77.6 kg (171 lb)   SpO2 96%   BMI 24.54 kg/m²          Physical Exam  Vitals reviewed. Constitutional:       General: He is not in acute distress. Appearance: Normal appearance. He is normal weight. He is not ill-appearing, toxic-appearing or diaphoretic. HENT:      Head: Normocephalic and atraumatic.       Right Ear: External ear normal. Left Ear: External ear normal.      Mouth/Throat:      Mouth: Mucous membranes are moist.   Eyes:      General: No scleral icterus. Conjunctiva/sclera: Conjunctivae normal.      Pupils: Pupils are equal, round, and reactive to light. Neck:      Vascular: No JVD. Trachea: No tracheal deviation. Cardiovascular:      Rate and Rhythm: Normal rate and regular rhythm. Heart sounds: Normal heart sounds. No murmur heard. Pulmonary:      Effort: Pulmonary effort is normal. No respiratory distress. Breath sounds: No wheezing, rhonchi or rales. Comments: Diminished breath sounds throughout both lung fields without rales rhonchi or wheezing  Abdominal:      General: Abdomen is flat. There is no distension. Tenderness: There is no abdominal tenderness. Musculoskeletal:         General: No tenderness. Cervical back: Neck supple. Right lower leg: Edema (1+ pretibial edema) present. Left lower leg: Edema (1+ pretibial edema) present. Skin:     General: Skin is warm. Coloration: Skin is not jaundiced. Findings: No bruising, erythema or rash. Neurological:      General: No focal deficit present. Mental Status: He is alert and oriented to person, place, and time. Mental status is at baseline.       Gait: Gait abnormal.      Comments: Parkinsonian features   Psychiatric:         Mood and Affect: Mood normal.         Behavior: Behavior normal.

## 2023-08-01 DIAGNOSIS — R60.9 PERIPHERAL EDEMA: Primary | ICD-10-CM

## 2023-08-14 PROBLEM — Z00.00 MEDICARE ANNUAL WELLNESS VISIT, SUBSEQUENT: Status: RESOLVED | Noted: 2023-06-15 | Resolved: 2023-08-14

## 2023-10-19 ENCOUNTER — TELEPHONE (OUTPATIENT)
Dept: INTERNAL MEDICINE CLINIC | Facility: OTHER | Age: 83
End: 2023-10-19

## 2023-10-25 ENCOUNTER — NURSING HOME VISIT (OUTPATIENT)
Dept: GERIATRICS | Facility: OTHER | Age: 83
End: 2023-10-25
Payer: MEDICARE

## 2023-10-25 VITALS
RESPIRATION RATE: 18 BRPM | BODY MASS INDEX: 24.48 KG/M2 | WEIGHT: 171 LBS | HEART RATE: 84 BPM | SYSTOLIC BLOOD PRESSURE: 120 MMHG | HEIGHT: 70 IN | DIASTOLIC BLOOD PRESSURE: 60 MMHG

## 2023-10-25 DIAGNOSIS — K59.09 OTHER CONSTIPATION: ICD-10-CM

## 2023-10-25 DIAGNOSIS — G20.C PRIMARY PARKINSONISM: Primary | ICD-10-CM

## 2023-10-25 PROCEDURE — 99347 HOME/RES VST EST SF MDM 20: CPT | Performed by: INTERNAL MEDICINE

## 2023-10-25 NOTE — PROGRESS NOTES
Assessment/Plan:    Other constipation  Continues on Rufina-Colace 1 capsule twice daily. Polyethylene glycol 17 g in a beverage daily. Encouraged fluid intake    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[address])  No changes in medication. Subjective:      Patient ID: Olinda Blandon is a 79 y.o. male. The patient was seen in his room at [facility] in a recent visit to the emergency room at [facility] because of severe constipation. Was given a milk and molasses enema and subsequently had 2 bowel movements of substantial size to the emergency room and felt comfortable enough to be discharged back to his assisted living facility. He complains that he still feels somewhat constipated but he is moving his bowels not as much as previously        Family History   Problem Relation Age of Onset    Stomach cancer Mother     Alzheimer's disease Father      Social History     Socioeconomic History    Marital status:       Spouse name: Not on file    Number of children: Not on file    Years of education: Not on file    Highest education level: Not on file   Occupational History    Not on file   Tobacco Use    Smoking status: Former     Packs/day: 0.50     Years: 50.00     Total pack years: 25.00     Types: Cigarettes     Start date:      Quit date:      Years since quittin.8    Smokeless tobacco: Never   Vaping Use    Vaping Use: Never used   Substance and Sexual Activity    Alcohol use: Not Currently    Drug use: Never    Sexual activity: Not on file   Other Topics Concern    Not on file   Social History Narrative    Not on file     Social Determinants of Health     Financial Resource Strain: Low Risk  (6/15/2023)    Overall Financial Resource Strain (CARDIA)     Difficulty of Paying Living Expenses: Not very hard   Food Insecurity: Not on file   Transportation Needs: No Transportation Needs (6/15/2023)    PRAPARE - Transportation     Lack of Transportation (Medical): No     Lack of Transportation (Non-Medical): No   Physical Activity: Not on file   Stress: Not on file   Social Connections: Not on file   Intimate Partner Violence: Not on file   Housing Stability: Not on file     Past Medical History:   Diagnosis Date    BPH (benign prostatic hyperplasia)     COPD (chronic obstructive pulmonary disease) ([facility])     Crohn's disease (HCC)     Depression     Restless legs syndrome        Current Outpatient Medications:     acetaminophen (TYLENOL) 325 mg tablet, Take 2 tablets (650 mg total) by mouth every 6 (six) hours as needed for mild pain, Disp: 80 tablet, Rfl: 0    albuterol (PROVENTIL HFA,VENTOLIN HFA) 90 mcg/act inhaler, Inhale 2 puffs every 4 (four) hours as needed for wheezing or shortness of breath, Disp: 18 g, Rfl: 5    ALPRAZolam (XANAX) 0.5 mg tablet, Take 1 tablet (0.5 mg total) by mouth 2 (two) times a day as needed for anxiety, Disp: 60 tablet, Rfl: 1    benzonatate (TESSALON) 200 MG capsule, 1 CAP BY MOUTH THREE TIMES DAILY AS NEEDED, Disp: , Rfl:     bismuth subsalicylate (PEPTO BISMOL) 524 mg/30 mL oral suspension, Take 15 mL (262 mg total) by mouth every 6 (six) hours as needed for indigestion Patient may self medicate and keep medication in his room. , Disp: 360 mL, Rfl: 0    budesonide (Pulmicort) 0.25 mg/2 mL nebulizer solution, Take 2 mL (0.25 mg total) by nebulization 2 (two) times a day Rinse mouth after use., Disp: 120 mL, Rfl: 2    carbidopa-levodopa (Sinemet)  mg per tablet, Take 1 tablet by mouth 3 (three) times a day, Disp: 90 tablet, Rfl: 5    ciprofloxacin (CILOXAN) 0.3 % ophthalmic solution, Administer 1 drop to both eyes every 2 (two) hours while awake, Disp: 10 mL, Rfl: 0    ergocalciferol (VITAMIN D2) 50,000 units, Take 50,000 Units by mouth once a week, Disp: , Rfl:     fluticasone (FLONASE) 50 mcg/act nasal spray, 1 spray into each nostril daily, Disp: , Rfl:     formoterol (PERFOROMIST) 20 MCG/2ML nebulizer solution, Take 20 mcg by nebulization every 12 (twelve) hours, Disp: , Rfl:     guaiFENesin (Mucinex) 600 mg 12 hr tablet, Take 2 tablets (1,200 mg total) by mouth every 12 (twelve) hours, Disp: 40 tablet, Rfl: 1    guaiFENesin (ROBITUSSIN) 100 MG/5ML oral liquid, Take 10 mL (200 mg total) by mouth 3 (three) times a day as needed for cough, Disp: 236 mL, Rfl: 3    hydrocortisone (ANUSOL-HC) 2.5 % rectal cream, Apply 1 application topically as needed for hemorrhoids, Disp: 28 g, Rfl: 5    menthol-zinc oxide (CALMOSPETINE) 0.44-20.625 %, Apply topically 2 (two) times a day, Disp: 113 g, Rfl: 2    Misc Natural Products (URINOZINC PO), Take 2 tablets by mouth in the morning, Disp: , Rfl:     naproxen sodium (ALEVE) 220 MG tablet, Take 1 tablet (220 mg total) by mouth 2 (two) times a day with meals, Disp: 60 tablet, Rfl: 5    nystatin (MYCOSTATIN) cream, Apply topically 2 (two) times a day, Disp: 30 g, Rfl: 1    omeprazole (PriLOSEC) 20 mg delayed release capsule, Take 20 mg by mouth daily, Disp: , Rfl:     polyethylene glycol (GLYCOLAX) 17 GM/SCOOP powder, Take 17 g by mouth daily as needed, Disp: , Rfl:     potassium chloride (Klor-Con) 10 mEq tablet, Take 10 mEq by mouth in the morning, Disp: , Rfl:     senna-docusate sodium (SENOKOT S) 8.6-50 mg per tablet, 1 TAB BY MOUTH TWICE DAILY, Disp: , Rfl:     tiotropium (SPIRIVA RESPIMAT) 2.5 MCG/ACT AERS inhaler, Inhale 2 puffs daily, Disp: , Rfl:     torsemide (DEMADEX) 20 mg tablet, Take 1 tablet (20 mg total) by mouth daily, Disp: 30 tablet, Rfl: 5  No Known Allergies  Past Surgical History:   Procedure Laterality Date    RECTAL SURGERY      abscess removed         Review of Systems   Constitutional: Negative. HENT: Negative. Eyes: Negative. Respiratory: Negative. Cardiovascular: Negative. Gastrointestinal:  Positive for constipation. Negative for abdominal distention, abdominal pain and blood in stool. Endocrine: Negative. Genitourinary: Negative. Musculoskeletal: Negative.     Neurological:  Positive for tremors (Parkinsonian rigidity tremor). Objective:      /60   Pulse 84   Resp 18   Ht 5' 10" (1.778 m)   Wt 77.6 kg (171 lb)   BMI 24.54 kg/m²          Physical Exam  Vitals reviewed. Constitutional:       General: He is not in acute distress. Appearance: He is normal weight. He is not ill-appearing, toxic-appearing or diaphoretic. HENT:      Head: Normocephalic and atraumatic. Right Ear: External ear normal.      Left Ear: External ear normal.   Eyes:      General: No scleral icterus. Conjunctiva/sclera: Conjunctivae normal.      Pupils: Pupils are equal, round, and reactive to light. Cardiovascular:      Rate and Rhythm: Normal rate and regular rhythm. Heart sounds: Normal heart sounds. Pulmonary:      Effort: Pulmonary effort is normal. No respiratory distress. Breath sounds: Normal breath sounds. Abdominal:      General: Abdomen is flat. There is no distension. Tenderness: There is no abdominal tenderness. Musculoskeletal:      Cervical back: Neck supple. No rigidity. Right lower leg: No edema. Left lower leg: No edema. Lymphadenopathy:      Cervical: No cervical adenopathy. Skin:     General: Skin is warm. Capillary Refill: Capillary refill takes less than 2 seconds. Coloration: Skin is not jaundiced. Findings: No bruising, erythema or rash. Neurological:      General: No focal deficit present. Mental Status: He is alert and oriented to person, place, and time. Gait: Gait abnormal (Parkinsonian).

## 2023-10-25 NOTE — ASSESSMENT & PLAN NOTE
Continues on Rufina-Colace 1 capsule twice daily. Polyethylene glycol 17 g in a beverage daily.   Encouraged fluid intake

## 2023-11-22 DIAGNOSIS — F41.9 ANXIETY: ICD-10-CM

## 2023-11-22 RX ORDER — ALPRAZOLAM 0.5 MG/1
0.5 TABLET ORAL 2 TIMES DAILY PRN
Qty: 60 TABLET | Refills: 1 | Status: SHIPPED | OUTPATIENT
Start: 2023-11-22

## 2023-11-30 ENCOUNTER — TELEPHONE (OUTPATIENT)
Age: 83
End: 2023-11-30

## 2023-12-08 DIAGNOSIS — G20.C PRIMARY PARKINSONISM: ICD-10-CM

## 2023-12-26 ENCOUNTER — TELEPHONE (OUTPATIENT)
Dept: INTERNAL MEDICINE CLINIC | Facility: OTHER | Age: 83
End: 2023-12-26

## 2024-01-03 ENCOUNTER — NURSING HOME VISIT (OUTPATIENT)
Dept: GERIATRICS | Facility: OTHER | Age: 84
End: 2024-01-03

## 2024-01-03 VITALS
HEART RATE: 60 BPM | DIASTOLIC BLOOD PRESSURE: 60 MMHG | WEIGHT: 171 LBS | SYSTOLIC BLOOD PRESSURE: 130 MMHG | HEIGHT: 70 IN | RESPIRATION RATE: 16 BRPM | BODY MASS INDEX: 24.48 KG/M2 | TEMPERATURE: 97.8 F

## 2024-01-03 DIAGNOSIS — G20.C PRIMARY PARKINSONISM: ICD-10-CM

## 2024-01-03 DIAGNOSIS — J43.1 PANLOBULAR EMPHYSEMA (HCC): ICD-10-CM

## 2024-01-03 DIAGNOSIS — I87.2 VENOUS INSUFFICIENCY (CHRONIC) (PERIPHERAL): Primary | ICD-10-CM

## 2024-01-03 NOTE — ASSESSMENT & PLAN NOTE
His edema appears to be nicely controlled on low-dose torsemide.  No additional or change in his dosage is required at this time

## 2024-01-03 NOTE — PROGRESS NOTES
"Assessment/Plan:    COPD (chronic obstructive pulmonary disease) (HCC)  Clinically stable.  Utilizes his oxygen for ambulation.  Compliant with Pulmicort twice daily Perforomist every 12 hours    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HCC)  Stable on current dosing of Sinemet    Venous insufficiency (chronic) (peripheral)  His edema appears to be nicely controlled on low-dose torsemide.  No additional or change in his dosage is required at this time           Subjective:      Patient ID: Den Warren is a 83 y.o. male.    Concerned about pain in his legs.  Patient was evaluated in his room at [facility] in [place].  He had recently been initiated on torsemide 20 mg because of increasing peripheral and pedal edema.  His edema appears to be resolving quite nicely with current dose of diuretics.  He is not complaining of any muscle weakness.  He has no urinary complaints.  Recent labs on 23 December shows no significant electrolyte abnormalities.  Sodium was slightly low at 133, chloride 96, CO2 was 30.  BUN is 22 creatinine was 1.12.  CBC was within normal limits.            Review of Systems   Constitutional: Negative.    HENT: Negative.     Eyes: Negative.    Respiratory: Negative.     Cardiovascular:  Positive for leg swelling (Which appears to be improving).   Endocrine: Negative.    Genitourinary: Negative.    Musculoskeletal: Negative.    Neurological:  Positive for tremors (Parkinsonian habitus).         Objective:      /60 (BP Location: Left arm, Patient Position: Standing, Cuff Size: Standard)   Pulse 60   Temp 97.8 °F (36.6 °C)   Resp 16   Ht 5' 10\" (1.778 m)   Wt 77.6 kg (171 lb)   BMI 24.54 kg/m²          Physical Exam  Vitals reviewed.   Constitutional:       General: He is not in acute distress.     Appearance: Normal appearance. He is normal weight. He is not ill-appearing, toxic-appearing or diaphoretic.   HENT:      Head: Normocephalic and atraumatic.      Right Ear: External ear normal.      Left " Ear: External ear normal.      Nose: Nose normal.   Eyes:      General: No scleral icterus.     Conjunctiva/sclera: Conjunctivae normal.      Pupils: Pupils are equal, round, and reactive to light.   Neck:      Vascular: No carotid bruit or JVD.      Trachea: No tracheal deviation.   Cardiovascular:      Rate and Rhythm: Normal rate and regular rhythm.      Pulses: Normal pulses.      Heart sounds: Normal heart sounds. No murmur heard.  Pulmonary:      Effort: Pulmonary effort is normal. No respiratory distress.      Breath sounds: Normal breath sounds. No rales.   Abdominal:      General: Abdomen is flat. There is no distension.   Musculoskeletal:         General: No swelling.      Cervical back: Neck supple.      Right lower leg: Edema (Trace pedal edema) present.      Left lower leg: Edema (Trace pedal edema) present.   Skin:     General: Skin is warm.      Coloration: Skin is not jaundiced.      Findings: No bruising, erythema or rash.   Neurological:      Mental Status: He is alert and oriented to person, place, and time. Mental status is at baseline.      Gait: Gait abnormal (Parkinsonian).   Psychiatric:         Mood and Affect: Mood normal.         Behavior: Behavior normal.

## 2024-01-03 NOTE — ASSESSMENT & PLAN NOTE
Clinically stable.  Utilizes his oxygen for ambulation.  Compliant with Pulmicort twice daily Perforomist every 12 hours

## 2024-01-24 ENCOUNTER — NURSING HOME VISIT (OUTPATIENT)
Dept: GERIATRICS | Facility: OTHER | Age: 84
End: 2024-01-24
Payer: MEDICARE

## 2024-01-24 VITALS — HEART RATE: 78 BPM | DIASTOLIC BLOOD PRESSURE: 60 MMHG | SYSTOLIC BLOOD PRESSURE: 134 MMHG

## 2024-01-24 DIAGNOSIS — R60.9 PERIPHERAL EDEMA: Primary | ICD-10-CM

## 2024-01-24 DIAGNOSIS — J44.1 ACUTE EXACERBATION OF CHRONIC OBSTRUCTIVE PULMONARY DISEASE (COPD) (HCC): ICD-10-CM

## 2024-01-24 PROCEDURE — 99347 HOME/RES VST EST SF MDM 20: CPT | Performed by: INTERNAL MEDICINE

## 2024-01-24 RX ORDER — PREDNISONE 20 MG/1
40 TABLET ORAL DAILY
Qty: 10 TABLET | Refills: 0 | Status: SHIPPED | OUTPATIENT
Start: 2024-01-24

## 2024-01-24 RX ORDER — AZITHROMYCIN 250 MG/1
TABLET, FILM COATED ORAL
Qty: 6 TABLET | Refills: 0 | Status: SHIPPED | OUTPATIENT
Start: 2024-01-24 | End: 2024-01-28

## 2024-01-25 NOTE — PROGRESS NOTES
Assessment/Plan:    Peripheral edema  Strongly encouraged the patient  to be compliant with his furosemide on a daily basis    Acute exacerbation of chronic obstructive pulmonary disease (COPD) ([facility])  Initiate prednisone 40 mg daily for 5 days along with azithromycin Z-Mario for 5 days             Subjective:      Patient ID: Den Warren is a 83 y.o. male.    Patient is seen in his room at [facility].  Nursing staff reports that the patient has been refusing to take his furosemide and his legs are swollen.  He is also complaining of increased shortness of breath and turning his oxygen up to 4 L/min when his orders are for only 2 L/min.  He is not experiencing any chills or fever and denies any worsening cough.            Review of Systems   Constitutional: Negative.    HENT: Negative.     Eyes: Negative.    Respiratory:  Positive for shortness of breath. Negative for chest tightness.    Cardiovascular:  Positive for leg swelling. Negative for chest pain and palpitations.   Gastrointestinal: Negative.    Endocrine: Negative.    Genitourinary: Negative.    Musculoskeletal: Negative.    Allergic/Immunologic: Negative.    Neurological: Negative.    Psychiatric/Behavioral: Negative.           Objective:      /60 (BP Location: Right arm, Patient Position: Standing, Cuff Size: Standard)   Pulse 78          Physical Exam  Vitals reviewed.   Constitutional:       General: He is not in acute distress.     Appearance: Normal appearance. He is normal weight. He is not ill-appearing, toxic-appearing or diaphoretic.   HENT:      Head: Normocephalic and atraumatic.      Right Ear: External ear normal.      Left Ear: External ear normal.   Eyes:      General: No scleral icterus.     Conjunctiva/sclera: Conjunctivae normal.      Pupils: Pupils are equal, round, and reactive to light.   Cardiovascular:      Rate and Rhythm: Normal rate and regular rhythm.      Heart sounds: Normal heart sounds.   Pulmonary:      Effort:  Pulmonary effort is normal. No respiratory distress.      Breath sounds: No stridor. Wheezing (Diffuse expiratory wheezing over the left lower lung field.) present. No rhonchi or rales.   Chest:      Chest wall: No tenderness.   Abdominal:      General: Abdomen is flat. There is no distension.   Musculoskeletal:         General: Swelling present.      Cervical back: Neck supple.      Right lower leg: Edema (2+ pedal and pretibial edema) present.      Left lower leg: Edema (2+ pedal and pretibial edema) present.   Skin:     General: Skin is warm.      Coloration: Skin is not jaundiced.      Findings: No bruising, erythema, lesion or rash.   Neurological:      General: No focal deficit present.      Mental Status: He is alert and oriented to person, place, and time. Mental status is at baseline.   Psychiatric:         Mood and Affect: Mood normal.         Behavior: Behavior normal.

## 2024-02-12 ENCOUNTER — TELEPHONE (OUTPATIENT)
Age: 84
End: 2024-02-12

## 2024-02-12 NOTE — TELEPHONE ENCOUNTER
Received call from [employer] wanting to know if pcp would like to continue rx predniSONE 20 mg tablet or d/c. If to continue medication, please send a refill to  [facility].

## 2024-02-16 ENCOUNTER — TELEPHONE (OUTPATIENT)
Age: 84
End: 2024-02-16

## 2024-02-20 ENCOUNTER — TELEPHONE (OUTPATIENT)
Age: 84
End: 2024-02-20

## 2024-02-20 NOTE — TELEPHONE ENCOUNTER
Yes a visit is needed. I will see the patient at [facility] tomorrow morning and we will take care of this along with a medicare Wellness visit.

## 2024-02-20 NOTE — TELEPHONE ENCOUNTER
Received call from patient's nephew Michi requesting a MA-[id number] form to be filled as patient is looking to switch nursing home facility. Please advise if an appointment is needed for form to be filled, it has been scanned into media for review and given to pcp. Patient aware of 15$ charge.

## 2024-02-21 ENCOUNTER — NURSING HOME VISIT (OUTPATIENT)
Dept: GERIATRICS | Facility: OTHER | Age: 84
End: 2024-02-21
Payer: MEDICARE

## 2024-02-21 VITALS
DIASTOLIC BLOOD PRESSURE: 60 MMHG | HEART RATE: 60 BPM | WEIGHT: 171 LBS | RESPIRATION RATE: 18 BRPM | HEIGHT: 70 IN | BODY MASS INDEX: 24.48 KG/M2 | SYSTOLIC BLOOD PRESSURE: 130 MMHG

## 2024-02-21 DIAGNOSIS — Z00.00 MEDICARE ANNUAL WELLNESS VISIT, SUBSEQUENT: Primary | ICD-10-CM

## 2024-02-21 DIAGNOSIS — I87.2 VENOUS INSUFFICIENCY (CHRONIC) (PERIPHERAL): ICD-10-CM

## 2024-02-21 DIAGNOSIS — R60.9 PERIPHERAL EDEMA: ICD-10-CM

## 2024-02-21 DIAGNOSIS — G20.C PRIMARY PARKINSONISM: ICD-10-CM

## 2024-02-21 DIAGNOSIS — J43.2 CENTRILOBULAR EMPHYSEMA (HCC): ICD-10-CM

## 2024-02-21 DIAGNOSIS — K62.89 ANAL SPHINCTER INCOMPETENCE: ICD-10-CM

## 2024-02-21 PROCEDURE — 99214 OFFICE O/P EST MOD 30 MIN: CPT | Performed by: INTERNAL MEDICINE

## 2024-02-21 PROCEDURE — G0438 PPPS, INITIAL VISIT: HCPCS | Performed by: INTERNAL MEDICINE

## 2024-02-21 NOTE — PROGRESS NOTES
Assessment and Plan:       Problem List Items Addressed This Visit          Respiratory    Pulmonary emphysema (HCC)     Continues with Pulmicort via nebulizer with formoterol via nebulizer and albuterol inhaler            Cardiovascular and Mediastinum    Venous insufficiency (chronic) (peripheral) - Primary     Encouraged the use of knee-high compression stockings            Nervous and Auditory    Primary parkinsonism     Doing well on Sinemet  3 times daily            Other    Anal sphincter incompetence     Complains of urgency of defecation.  Tries to maintain regular toileting         Peripheral edema     Continues on torsemide 20 mg daily.  Encouraged the patient to wear knee-high compression stockings,20-30 mmHG             Preventive health issues were discussed with patient, and age appropriate screening tests were ordered as noted in patient's After Visit Summary.  Personalized health advice and appropriate referrals for health education or preventive services given if needed, as noted in patient's After Visit Summary.     History of Present Illness:     Patient presents for a Medicare Wellness Visit      Patient Care Team:  Alfonso Turpin MD as PCP - General (Internal Medicine)     Review of Systems:     Review of Systems   Constitutional: Negative.    HENT: Negative.     Eyes: Negative.    Respiratory:  Positive for shortness of breath (Intermittently).    Cardiovascular:  Positive for leg swelling (Bilateral edema).   Gastrointestinal: Negative.         Anal sphincter incompetence.  Urgency of defecation   Endocrine: Negative.    Genitourinary: Negative.    Musculoskeletal: Negative.    Skin: Negative.    Allergic/Immunologic: Negative.    Neurological:  Positive for tremors (Parkinsonian).   Psychiatric/Behavioral:  Positive for dysphoric mood.         Problem List:     Patient Active Problem List   Diagnosis    Anxiety    Former smoker    Pulmonary emphysema (HCC)    Anal sphincter  incompetence    Ambulatory dysfunction    Acute exacerbation of chronic obstructive pulmonary disease (COPD) (HCC)    Depression    Hemorrhoids    BPH (benign prostatic hyperplasia)    Primary parkinsonism    Peripheral edema    Venous insufficiency (chronic) (peripheral)    Grade I diastolic dysfunction    Lung nodule    Nausea    Other constipation        Past Medical and Surgical History:     Past Medical History:   Diagnosis Date    BPH (benign prostatic hyperplasia)     COPD (chronic obstructive pulmonary disease) (HCC)     Crohn's disease (HCC)     Depression     Restless legs syndrome      Past Surgical History:   Procedure Laterality Date    RECTAL SURGERY      abscess removed        Family History:     Family History   Problem Relation Age of Onset    Stomach cancer Mother     Alzheimer's disease Father         Social History:     Social History     Socioeconomic History    Marital status:      Spouse name: Not on file    Number of children: Not on file    Years of education: Not on file    Highest education level: Not on file   Occupational History    Not on file   Tobacco Use    Smoking status: Former     Current packs/day: 0.00     Average packs/day: 0.5 packs/day for 50.0 years (25.0 ttl pk-yrs)     Types: Cigarettes     Start date: 1959     Quit date: 2009     Years since quitting: 15.1    Smokeless tobacco: Never   Vaping Use    Vaping status: Never Used   Substance and Sexual Activity    Alcohol use: Not Currently    Drug use: Never    Sexual activity: Not on file   Other Topics Concern    Not on file   Social History Narrative    Not on file     Social Determinants of Health     Financial Resource Strain: Medium Risk (2/21/2024)    Overall Financial Resource Strain (CARDIA)     Difficulty of Paying Living Expenses: Somewhat hard   Food Insecurity: No Food Insecurity (1/30/2023)    Received from [facility]    Hunger Vital Sign     Worried About Running Out of Food in the Last  Year: Never true     Ran Out of Food in the Last Year: Never true   Transportation Needs: No Transportation Needs (2/21/2024)    PRAPARE - Transportation     Lack of Transportation (Medical): No     Lack of Transportation (Non-Medical): No   Physical Activity: Insufficiently Active (8/18/2021)    Received from [facility]    Exercise Vital Sign     Days of Exercise per Week: 3 days     Minutes of Exercise per Session: 20 min   Stress: No Stress Concern Present (8/18/2021)    Received from [facility]    [employer] - Occupational Stress Questionnaire     Feeling of Stress : Not at all   Social Connections: Socially Isolated (8/18/2021)    Received from [facility]    Social Connection and Isolation Panel [NHANES]     Frequency of Communication with Friends and Family: Once a week     Frequency of Social Gatherings with Friends and Family: Once a week     Attends [employer] Services: Never     Active Member of Clubs or Organizations: No     Attends Club or Organization Meetings: Never     Marital Status:    Intimate Partner Violence: Not At Risk (1/30/2023)    Received from [facility]    Humiliation, Afraid, Rape, and Kick questionnaire     Fear of Current or Ex-Partner: No     Emotionally Abused: No     Physically Abused: No     Sexually Abused: No   Housing Stability: Low Risk  (1/30/2023)    Received from [facility]    Housing Stability Vital Sign     Unable to Pay for Housing in the Last Year: No     Number of Places Lived in the Last Year: 1     Unstable Housing in the Last Year: No        Medications and Allergies:     Current Outpatient Medications   Medication Sig Dispense Refill    acetaminophen (TYLENOL) 325 mg tablet Take 2 tablets (650 mg total) by mouth every 6 (six) hours as needed for mild pain 80 tablet 0    albuterol (PROVENTIL HFA,VENTOLIN HFA) 90 mcg/act inhaler INHALE 2 PUFFS EVERY 4 (FOUR) HOURS AS NEEDED FOR WHEEZING  OR SHORTNESS OF BREATH 8.5 g 10    ALPRAZolam (XANAX) 0.5 mg tablet Take 1 tablet (0.5 mg total) by mouth 2 (two) times a day as needed for anxiety 60 tablet 1    benzonatate (TESSALON) 200 MG capsule 1 CAP BY MOUTH THREE TIMES DAILY AS NEEDED      bismuth subsalicylate (PEPTO BISMOL) 524 mg/30 mL oral suspension Take 15 mL (262 mg total) by mouth every 6 (six) hours as needed for indigestion Patient may self medicate and keep medication in his room. 360 mL 0    budesonide (Pulmicort) 0.25 mg/2 mL nebulizer solution Take 2 mL (0.25 mg total) by nebulization 2 (two) times a day Rinse mouth after use. 120 mL 2    carbidopa-levodopa (Sinemet)  mg per tablet Take 1 tablet by mouth 3 (three) times a day 90 tablet 3    ciprofloxacin (CILOXAN) 0.3 % ophthalmic solution Administer 1 drop to both eyes every 2 (two) hours while awake 10 mL 0    ergocalciferol (VITAMIN D2) 50,000 units Take 50,000 Units by mouth once a week      fluticasone (FLONASE) 50 mcg/act nasal spray 1 spray into each nostril daily      formoterol (PERFOROMIST) 20 MCG/2ML nebulizer solution Take 20 mcg by nebulization every 12 (twelve) hours      guaiFENesin (Mucinex) 600 mg 12 hr tablet Take 2 tablets (1,200 mg total) by mouth every 12 (twelve) hours 40 tablet 1    guaiFENesin (ROBITUSSIN) 100 MG/5ML oral liquid Take 10 mL (200 mg total) by mouth 3 (three) times a day as needed for cough 236 mL 3    hydrocortisone (ANUSOL-HC) 2.5 % rectal cream Apply 1 application topically as needed for hemorrhoids 28 g 5    menthol-zinc oxide (CALMOSPETINE) 0.44-20.625 % Apply topically 2 (two) times a day 113 g 2    Misc Natural Products (URINOZINC PO) Take 2 tablets by mouth in the morning      naproxen sodium (ALEVE) 220 MG tablet Take 1 tablet (220 mg total) by mouth 2 (two) times a day with meals 60 tablet 5    nystatin (MYCOSTATIN) cream Apply topically 2 (two) times a day 30 g 1    omeprazole (PriLOSEC) 20 mg delayed release capsule Take 20 mg by mouth  daily      polyethylene glycol (GLYCOLAX) 17 GM/SCOOP powder Take 17 g by mouth daily as needed      potassium chloride (Klor-Con) 10 mEq tablet Take 10 mEq by mouth in the morning      senna-docusate sodium (SENOKOT S) 8.6-50 mg per tablet 1 TAB BY MOUTH TWICE DAILY      tiotropium (SPIRIVA RESPIMAT) 2.5 MCG/ACT AERS inhaler Inhale 2 puffs daily      torsemide (DEMADEX) 20 mg tablet Take 1 tablet (20 mg total) by mouth daily 30 tablet 5     No current facility-administered medications for this visit.     No Known Allergies     Immunizations:     Immunization History   Administered Date(s) Administered    COVID-19 MODERNA VACC 0.5 ML IM 03/16/2021, 04/13/2021, 08/02/2022    INFLUENZA 10/14/2022    Influenza Split High Dose Preservative Free IM 10/05/2017, 02/10/2020    Influenza Whole 10/01/2019, 09/29/2020    Influenza, high dose seasonal 0.7 mL 10/31/2016, 09/21/2020, 10/14/2022    Pneumococcal Conjugate 13-Valent 10/16/2015    Pneumococcal Polysaccharide PPV23 10/24/2014    Tuberculin Skin Test-PPD Intradermal 08/24/2010, 09/07/2010        Health Maintenance:     There are no preventive care reminders to display for this patient.      Topic Date Due    Influenza Vaccine (1) 09/01/2023    COVID-19 Vaccine (4 - 2023-24 season) 09/01/2023      Medicare Screening Tests and Risk Assessments:     Den is here for his Subsequent Wellness visit.     Health Risk Assessment:   Patient rates overall health as fair. Patient feels that their physical health rating is slightly worse. Patient is dissatisfied with their life. Eyesight was rated as same. Hearing was rated as same. Patient feels that their emotional and mental health rating is slightly worse. Patients states they are never, rarely angry. Patient states they are often unusually tired/fatigued.     Fall Risk Screening:   In the past year, patient has experienced: no history of falling in past year      Home Safety:  Patient has trouble with stairs inside or  outside of their home. Patient has working smoke alarms and has working carbon monoxide detector. Home safety hazards include: none.     Medications:   Patient is not currently taking any over-the-counter supplements. Patient is not able to manage medications. Medications are poured and given to patient    Activities of Daily Living (ADLs)/Instrumental Activities of Daily Living (IADLs):   Walk and transfer into and out of bed and chair?: Yes  Dress and groom yourself?: Yes    Bathe or shower yourself?: Yes    Feed yourself? Yes  Do your laundry/housekeeping?: No  Manage your money, pay your bills and track your expenses?: No  Make your own meals?: No    Do your own shopping?: No    Advance Care Planning:   Living will: Yes    Durable POA for healthcare: Yes    Advanced directive: Yes    Advanced directive counseling given: No    Five wishes given: No    Patient declined ACP directive: Yes    End of Life Decisions reviewed with patient: Yes    Provider agrees with end of life decisions: Yes      Cognitive Screening:   Provider or family/friend/caregiver concerned regarding cognition?: No    PREVENTIVE SCREENINGS      Cardiovascular Screening:    General: Risks and Benefits Discussed and Screening Current      Diabetes Screening:     General: Screening Current      Colorectal Cancer Screening:     General: Patient Declines      Prostate Cancer Screening:    General: Screening Not Indicated and Patient Declines      Osteoporosis Screening:    General: Patient Declines and Screening Not Indicated      Abdominal Aortic Aneurysm (AAA) Screening:    Risk factors include: tobacco use        General: Screening Not Indicated      Lung Cancer Screening:     General: Screening Not Indicated      Hepatitis C Screening:    General: Screening Not Indicated    Screening, Brief Intervention, and Referral to Treatment (SBIRT)    Screening  Typical number of drinks in a day: 0  Typical number of drinks in a week: 0  Interpretation:  "Low risk drinking behavior.    Single Item Drug Screening:  How often have you used an illegal drug (including marijuana) or a prescription medication for non-medical reasons in the past year? never    Single Item Drug Screen Score: 0  Interpretation: Negative screen for possible drug use disorder    No results found.     Physical Exam:     /60 (BP Location: Left arm, Patient Position: Sitting, Cuff Size: Standard)   Pulse 60   Resp 18   Ht 5' 10\" (1.778 m)   Wt 77.6 kg (171 lb)   BMI 24.54 kg/m²       Physical Exam  Vitals reviewed.   Constitutional:       General: He is not in acute distress.     Appearance: Normal appearance. He is normal weight. He is not ill-appearing, toxic-appearing or diaphoretic.   HENT:      Head: Normocephalic and atraumatic.      Right Ear: External ear normal.      Left Ear: External ear normal.      Nose: Nose normal.      Mouth/Throat:      Mouth: Mucous membranes are moist.      Pharynx: Oropharynx is clear.   Eyes:      General: No scleral icterus.     Conjunctiva/sclera: Conjunctivae normal.      Pupils: Pupils are equal, round, and reactive to light.   Neck:      Vascular: No carotid bruit.   Cardiovascular:      Rate and Rhythm: Normal rate and regular rhythm.      Pulses: Normal pulses.      Heart sounds: Normal heart sounds. No murmur heard.  Pulmonary:      Effort: Pulmonary effort is normal. No respiratory distress.      Breath sounds: Normal breath sounds. No wheezing or rales.   Abdominal:      General: Abdomen is flat. Bowel sounds are normal. There is no distension.   Musculoskeletal:      Cervical back: Neck supple.      Right lower leg: Edema (2+ pretibial and pedal edema) present.      Left lower leg: Edema (1+ pretibial and pedal edema) present.   Lymphadenopathy:      Cervical: No cervical adenopathy.   Skin:     Capillary Refill: Capillary refill takes less than 2 seconds.   Neurological:      General: No focal deficit present.      Mental Status: He is " alert and oriented to person, place, and time. Mental status is at baseline.      Cranial Nerves: No cranial nerve deficit.      Sensory: No sensory deficit.      Motor: No weakness.      Gait: Gait abnormal (Parkinsonian).      Comments: Parkinsonian gait and facies   Psychiatric:         Mood and Affect: Mood normal.         Behavior: Behavior normal.

## 2024-02-21 NOTE — PATIENT INSTRUCTIONS
Medicare Preventive Visit Patient Instructions  Thank you for completing your Welcome to Medicare Visit or Medicare Annual Wellness Visit today. Your next wellness visit will be due in one year (2/21/2025).  The screening/preventive services that you may require over the next 5-10 years are detailed below. Some tests may not apply to you based off risk factors and/or age. Screening tests ordered at today's visit but not completed yet may show as past due. Also, please note that scanned in results may not display below.  Preventive Screenings:  Service Recommendations Previous Testing/Comments   Colorectal Cancer Screening  Colonoscopy    Fecal Occult Blood Test (FOBT)/Fecal Immunochemical Test (FIT)  Fecal DNA/Cologuard Test  Flexible Sigmoidoscopy Age: 45-75 years old   Colonoscopy: every 10 years (May be performed more frequently if at higher risk)  OR  FOBT/FIT: every 1 year  OR  Cologuard: every 3 years  OR  Sigmoidoscopy: every 5 years  Screening may be recommended earlier than age 45 if at higher risk for colorectal cancer. Also, an individualized decision between you and your healthcare provider will decide whether screening between the ages of 76-85 would be appropriate. Colonoscopy: Not on file  FOBT/FIT: Not on file  Cologuard: Not on file  Sigmoidoscopy: Not on file    Patient Declines     Prostate Cancer Screening Individualized decision between patient and health care provider in men between ages of 55-69   Medicare will cover every 12 months beginning on the day after your 50th birthday PSA: No results in last 5 years     Screening Not Indicated  Patient Declines     Hepatitis C Screening Once for adults born between 1945 and 1965  More frequently in patients at high risk for Hepatitis C Hep C Antibody: Not on file    Screening Not Indicated   Diabetes Screening 1-2 times per year if you're at risk for diabetes or have pre-diabetes Fasting glucose: No results in last 5 years (No results in last 5  years)  A1C: No results in last 5 years (No results in last 5 years)  Screening Current   Cholesterol Screening Once every 5 years if you don't have a lipid disorder. May order more often based on risk factors. Lipid panel: Not on file  Risks and Benefits Discussed  Screening Current      Other Preventive Screenings Covered by Medicare:  Abdominal Aortic Aneurysm (AAA) Screening: covered once if your at risk. You're considered to be at risk if you have a family history of AAA or a male between the age of 65-75 who smoking at least 100 cigarettes in your lifetime.  Lung Cancer Screening: covers low dose CT scan once per year if you meet all of the following conditions: (1) Age 55-77; (2) No signs or symptoms of lung cancer; (3) Current smoker or have quit smoking within the last 15 years; (4) You have a tobacco smoking history of at least 20 pack years (packs per day x number of years you smoked); (5) You get a written order from a healthcare provider.  Glaucoma Screening: covered annually if you're considered high risk: (1) You have diabetes OR (2) Family history of glaucoma OR (3)  aged 50 and older OR (4)  American aged 65 and older  Osteoporosis Screening: covered every 2 years if you meet one of the following conditions: (1) Have a vertebral abnormality; (2) On glucocorticoid therapy for more than 3 months; (3) Have primary hyperparathyroidism; (4) On osteoporosis medications and need to assess response to drug therapy.  HIV Screening: covered annually if you're between the age of 15-65. Also covered annually if you are younger than 15 and older than 65 with risk factors for HIV infection. For pregnant patients, it is covered up to 3 times per pregnancy.    Immunizations:  Immunization Recommendations   Influenza Vaccine Annual influenza vaccination during flu season is recommended for all persons aged >= 6 months who do not have contraindications   Pneumococcal Vaccine   * Pneumococcal  conjugate vaccine = PCV13 (Prevnar 13), PCV15 (Vaxneuvance), PCV20 (Prevnar 20)  * Pneumococcal polysaccharide vaccine = PPSV23 (Pneumovax) Adults 19-63 yo with certain risk factors or if 65+ yo  If never received any pneumonia vaccine: recommend Prevnar 20 (PCV20)  Give PCV20 if previously received 1 dose of PCV13 or PPSV23   Hepatitis B Vaccine 3 dose series if at intermediate or high risk (ex: diabetes, end stage renal disease, liver disease)   Respiratory syncytial virus (RSV) Vaccine - COVERED BY MEDICARE PART D  * RSVPreF3 (Arexvy) CDC recommends that adults 60 years of age and older may receive a single dose of RSV vaccine using shared clinical decision-making (SCDM)   Tetanus (Td) Vaccine - COST NOT COVERED BY MEDICARE PART B Following completion of primary series, a booster dose should be given every 10 years to maintain immunity against tetanus. Td may also be given as tetanus wound prophylaxis.   Tdap Vaccine - COST NOT COVERED BY MEDICARE PART B Recommended at least once for all adults. For pregnant patients, recommended with each pregnancy.   Shingles Vaccine (Shingrix) - COST NOT COVERED BY MEDICARE PART B  2 shot series recommended in those 19 years and older who have or will have weakened immune systems or those 50 years and older     Health Maintenance Due:  There are no preventive care reminders to display for this patient.  Immunizations Due:      Topic Date Due   • Influenza Vaccine (1) 09/01/2023   • COVID-19 Vaccine (4 - 2023-24 season) 09/01/2023     Advance Directives   What are advance directives?  Advance directives are legal documents that state your wishes and plans for medical care. These plans are made ahead of time in case you lose your ability to make decisions for yourself. Advance directives can apply to any medical decision, such as the treatments you want, and if you want to donate organs.   What are the types of advance directives?  There are many types of advance directives, and  each state has rules about how to use them. You may choose a combination of any of the following:  Living will:  This is a written record of the treatment you want. You can also choose which treatments you do not want, which to limit, and which to stop at a certain time. This includes surgery, medicine, IV fluid, and tube feedings.   Durable power of  for healthcare (DPAHC):  This is a written record that states who you want to make healthcare choices for you when you are unable to make them for yourself. This person, called a proxy, is usually a family member or a friend. You may choose more than 1 proxy.  Do not resuscitate (DNR) order:  A DNR order is used in case your heart stops beating or you stop breathing. It is a request not to have certain forms of treatment, such as CPR. A DNR order may be included in other types of advance directives.  Medical directive:  This covers the care that you want if you are in a coma, near death, or unable to make decisions for yourself. You can list the treatments you want for each condition. Treatment may include pain medicine, surgery, blood transfusions, dialysis, IV or tube feedings, and a ventilator (breathing machine).  Values history:  This document has questions about your views, beliefs, and how you feel and think about life. This information can help others choose the care that you would choose.  Why are advance directives important?  An advance directive helps you control your care. Although spoken wishes may be used, it is better to have your wishes written down. Spoken wishes can be misunderstood, or not followed. Treatments may be given even if you do not want them. An advance directive may make it easier for your family to make difficult choices about your care.       © Copyright [employer] 2018 Information is for End User's use only and may not be sold, redistributed or otherwise used for commercial purposes. All illustrations and images included  in CareNotes® are the copyrighted property of A.D.A.M., Inc. or [employer]

## 2024-02-21 NOTE — TELEPHONE ENCOUNTER
15$ form fee has been applied to patient's chart. Completed form scanned into media for review. Patient's nephew will  week of 02/26/2024. Placed in brown folder, will pay during .

## 2024-02-21 NOTE — PROGRESS NOTES
Assessment and Plan:     Problem List Items Addressed This Visit        Respiratory    Pulmonary emphysema (HCC)     Continues with Pulmicort via nebulizer with formoterol via nebulizer and albuterol inhaler            Cardiovascular and Mediastinum    Venous insufficiency (chronic) (peripheral)     Encouraged the use of knee-high compression stockings            Nervous and Auditory    Primary parkinsonism     Doing well on Sinemet  3 times daily            Other    Anal sphincter incompetence     Complains of urgency of defecation.  Tries to maintain regular toileting         Medicare annual wellness visit, subsequent - Primary     Patient is up-to-date with age-appropriate screenings and immunizations.         Peripheral edema     Continues on torsemide 20 mg daily.  Encouraged the patient to wear knee-high compression stockings,20-30 mmHG             Preventive health issues were discussed with patient, and age appropriate screening tests were ordered as noted in patient's After Visit Summary.  Personalized health advice and appropriate referrals for health education or preventive services given if needed, as noted in patient's After Visit Summary.     History of Present Illness:     Patient presents for a Medicare Wellness Visit    HPI   Patient Care Team:  Alfonso Turpin MD as PCP - General (Internal Medicine)     Review of Systems:     Review of Systems     Problem List:     Patient Active Problem List   Diagnosis   • Anxiety   • Former smoker   • Pulmonary emphysema (HCC)   • Anal sphincter incompetence   • Ambulatory dysfunction   • Acute exacerbation of chronic obstructive pulmonary disease (COPD) (HCC)   • Depression   • Hemorrhoids   • BPH (benign prostatic hyperplasia)   • Primary parkinsonism   • Peripheral edema   • Venous insufficiency (chronic) (peripheral)   • Grade I diastolic dysfunction   • Lung nodule   • Medicare annual wellness visit, subsequent   • Nausea   • Other  constipation      Past Medical and Surgical History:     Past Medical History:   Diagnosis Date   • BPH (benign prostatic hyperplasia)    • COPD (chronic obstructive pulmonary disease) (HCC)    • Crohn's disease (HCC)    • Depression    • Restless legs syndrome      Past Surgical History:   Procedure Laterality Date   • RECTAL SURGERY      abscess removed      Family History:     Family History   Problem Relation Age of Onset   • Stomach cancer Mother    • Alzheimer's disease Father       Social History:     Social History     Socioeconomic History   • Marital status:      Spouse name: Not on file   • Number of children: Not on file   • Years of education: Not on file   • Highest education level: Not on file   Occupational History   • Not on file   Tobacco Use   • Smoking status: Former     Current packs/day: 0.00     Average packs/day: 0.5 packs/day for 50.0 years (25.0 ttl pk-yrs)     Types: Cigarettes     Start date: 1959     Quit date: 2009     Years since quitting: 15.1   • Smokeless tobacco: Never   Vaping Use   • Vaping status: Never Used   Substance and Sexual Activity   • Alcohol use: Not Currently   • Drug use: Never   • Sexual activity: Not on file   Other Topics Concern   • Not on file   Social History Narrative   • Not on file     Social Determinants of Health     Financial Resource Strain: Medium Risk (2/21/2024)    Overall Financial Resource Strain (CARDIA)    • Difficulty of Paying Living Expenses: Somewhat hard   Food Insecurity: No Food Insecurity (1/30/2023)    Received from [facility]    Hunger Vital Sign    • Worried About Running Out of Food in the Last Year: Never true    • Ran Out of Food in the Last Year: Never true   Transportation Needs: No Transportation Needs (2/21/2024)    PRAPARE - Transportation    • Lack of Transportation (Medical): No    • Lack of Transportation (Non-Medical): No   Physical Activity: Insufficiently Active (8/18/2021)    Received from [facility]    Exercise Vital Sign    • Days of Exercise per Week: 3 days    • Minutes of Exercise per Session: 20 min   Stress: No Stress Concern Present (8/18/2021)    Received from [facility]    [place] [employer] - Occupational Stress Questionnaire    • Feeling of Stress : Not at all   Social Connections: Socially Isolated (8/18/2021)    Received from [facility]    Social Connection and Isolation Panel [NHANES]    • Frequency of Communication with Friends and Family: Once a week    • Frequency of Social Gatherings with Friends and Family: Once a week    • Attends Yazdanism Services: Never    • Active Member of Clubs or Organizations: No    • Attends Club or Organization Meetings: Never    • Marital Status:    Intimate Partner Violence: Not At Risk (1/30/2023)    Received from [facility]    Humiliation, Afraid, Rape, and Kick questionnaire    • Fear of Current or Ex-Partner: No    • Emotionally Abused: No    • Physically Abused: No    • Sexually Abused: No   Housing Stability: Low Risk  (1/30/2023)    Received from [facility]    Housing Stability Vital Sign    • Unable to Pay for Housing in the Last Year: No    • Number of Places Lived in the Last Year: 1    • Unstable Housing in the Last Year: No      Medications and Allergies:     Current Outpatient Medications   Medication Sig Dispense Refill   • acetaminophen (TYLENOL) 325 mg tablet Take 2 tablets (650 mg total) by mouth every 6 (six) hours as needed for mild pain 80 tablet 0   • albuterol (PROVENTIL HFA,VENTOLIN HFA) 90 mcg/act inhaler INHALE 2 PUFFS EVERY 4 (FOUR) HOURS AS NEEDED FOR WHEEZING OR SHORTNESS OF BREATH 8.5 g 10   • ALPRAZolam (XANAX) 0.5 mg tablet Take 1 tablet (0.5 mg total) by mouth 2 (two) times a day as needed for anxiety 60 tablet 1   • benzonatate (TESSALON) 200 MG capsule 1 CAP BY MOUTH THREE TIMES DAILY AS NEEDED     • bismuth subsalicylate (PEPTO BISMOL) 524 mg/30 mL  oral suspension Take 15 mL (262 mg total) by mouth every 6 (six) hours as needed for indigestion Patient may self medicate and keep medication in his room. 360 mL 0   • budesonide (Pulmicort) 0.25 mg/2 mL nebulizer solution Take 2 mL (0.25 mg total) by nebulization 2 (two) times a day Rinse mouth after use. 120 mL 2   • carbidopa-levodopa (Sinemet)  mg per tablet Take 1 tablet by mouth 3 (three) times a day 90 tablet 3   • ciprofloxacin (CILOXAN) 0.3 % ophthalmic solution Administer 1 drop to both eyes every 2 (two) hours while awake 10 mL 0   • ergocalciferol (VITAMIN D2) 50,000 units Take 50,000 Units by mouth once a week     • fluticasone (FLONASE) 50 mcg/act nasal spray 1 spray into each nostril daily     • formoterol (PERFOROMIST) 20 MCG/2ML nebulizer solution Take 20 mcg by nebulization every 12 (twelve) hours     • guaiFENesin (Mucinex) 600 mg 12 hr tablet Take 2 tablets (1,200 mg total) by mouth every 12 (twelve) hours 40 tablet 1   • guaiFENesin (ROBITUSSIN) 100 MG/5ML oral liquid Take 10 mL (200 mg total) by mouth 3 (three) times a day as needed for cough 236 mL 3   • hydrocortisone (ANUSOL-HC) 2.5 % rectal cream Apply 1 application topically as needed for hemorrhoids 28 g 5   • menthol-zinc oxide (CALMOSPETINE) 0.44-20.625 % Apply topically 2 (two) times a day 113 g 2   • Misc Natural Products (URINOZINC PO) Take 2 tablets by mouth in the morning     • naproxen sodium (ALEVE) 220 MG tablet Take 1 tablet (220 mg total) by mouth 2 (two) times a day with meals 60 tablet 5   • nystatin (MYCOSTATIN) cream Apply topically 2 (two) times a day 30 g 1   • omeprazole (PriLOSEC) 20 mg delayed release capsule Take 20 mg by mouth daily     • polyethylene glycol (GLYCOLAX) 17 GM/SCOOP powder Take 17 g by mouth daily as needed     • potassium chloride (Klor-Con) 10 mEq tablet Take 10 mEq by mouth in the morning     • senna-docusate sodium (SENOKOT S) 8.6-50 mg per tablet 1 TAB BY MOUTH TWICE DAILY     • tiotropium  "(SPIRIVA RESPIMAT) 2.5 MCG/ACT AERS inhaler Inhale 2 puffs daily     • torsemide (DEMADEX) 20 mg tablet Take 1 tablet (20 mg total) by mouth daily 30 tablet 5     No current facility-administered medications for this visit.     No Known Allergies   Immunizations:     Immunization History   Administered Date(s) Administered   • COVID-19 MODERNA VACC 0.5 ML IM 03/16/2021, 04/13/2021, 08/02/2022   • INFLUENZA 10/14/2022   • Influenza Split High Dose Preservative Free IM 10/05/2017, 02/10/2020   • Influenza Whole 10/01/2019, 09/29/2020   • Influenza, high dose seasonal 0.7 mL 10/31/2016, 09/21/2020, 10/14/2022   • Pneumococcal Conjugate 13-Valent 10/16/2015   • Pneumococcal Polysaccharide PPV23 10/24/2014   • Tuberculin Skin Test-PPD Intradermal 08/24/2010, 09/07/2010      Health Maintenance:     There are no preventive care reminders to display for this patient.      Topic Date Due   • Influenza Vaccine (1) 09/01/2023   • COVID-19 Vaccine (4 - 2023-24 season) 09/01/2023      Medicare Screening Tests and Risk Assessments:     Annual Wellness Visit  No results found.     Physical Exam:     /60 (BP Location: Left arm, Patient Position: Sitting, Cuff Size: Standard)   Pulse 60   Resp 18   Ht 5' 10\" (1.778 m)   Wt 77.6 kg (171 lb)   BMI 24.54 kg/m²     Physical Exam     Alfonso Turpin MD  "

## 2024-03-20 ENCOUNTER — TELEPHONE (OUTPATIENT)
Age: 84
End: 2024-03-20

## 2024-03-20 NOTE — TELEPHONE ENCOUNTER
Harry called for an oxygen order from Dr. Turpin. Patient is leaving [facility] on Friday to [facility] and will need to take the order with him.  Currently doing 2L and only uses it when he needs it.     Fax: [phone]

## 2024-03-21 DIAGNOSIS — J43.9 PULMONARY EMPHYSEMA, UNSPECIFIED EMPHYSEMA TYPE (HCC): Primary | ICD-10-CM

## 2024-03-29 ENCOUNTER — TELEPHONE (OUTPATIENT)
Dept: INTERNAL MEDICINE CLINIC | Age: 84
End: 2024-03-29

## 2024-03-29 NOTE — TELEPHONE ENCOUNTER
"Please call & request written request for patient's record.      \"Anya Guadalupe. I'm calling from [facility]... I'm just reaching out to see his immunization history. If you could fax that over to me or e-mail it to me, my direct call back number is [phone] and our fax number is [phone].\"    "

## 2025-02-06 ENCOUNTER — TELEPHONE (OUTPATIENT)
Dept: INTERNAL MEDICINE CLINIC | Facility: OTHER | Age: 85
End: 2025-02-06

## 2025-03-30 ENCOUNTER — APPOINTMENT (EMERGENCY)
Dept: RADIOLOGY | Facility: HOSPITAL | Age: 85
DRG: 870 | End: 2025-03-30
Payer: MEDICARE

## 2025-03-30 ENCOUNTER — HOSPITAL ENCOUNTER (INPATIENT)
Facility: HOSPITAL | Age: 85
LOS: 7 days | Discharge: HOSPICE | DRG: 870 | End: 2025-04-07
Attending: EMERGENCY MEDICINE | Admitting: INTERNAL MEDICINE
Payer: MEDICARE

## 2025-03-30 DIAGNOSIS — I99.8 ISCHEMIA: ICD-10-CM

## 2025-03-30 DIAGNOSIS — J96.01 ACUTE HYPOXIC RESPIRATORY FAILURE (HCC): ICD-10-CM

## 2025-03-30 DIAGNOSIS — J96.01 ACUTE HYPOXEMIC RESPIRATORY FAILURE (HCC): ICD-10-CM

## 2025-03-30 DIAGNOSIS — R79.89 LACTATE BLOOD INCREASE: ICD-10-CM

## 2025-03-30 DIAGNOSIS — N17.9 AKI (ACUTE KIDNEY INJURY) (HCC): ICD-10-CM

## 2025-03-30 DIAGNOSIS — A41.9 SEPSIS (HCC): Primary | ICD-10-CM

## 2025-03-30 DIAGNOSIS — J18.9 PNEUMONIA: ICD-10-CM

## 2025-03-30 DIAGNOSIS — J86.9 EMPYEMA (HCC): ICD-10-CM

## 2025-03-30 PROCEDURE — 99285 EMERGENCY DEPT VISIT HI MDM: CPT

## 2025-03-30 PROCEDURE — 83605 ASSAY OF LACTIC ACID: CPT

## 2025-03-30 PROCEDURE — 85610 PROTHROMBIN TIME: CPT

## 2025-03-30 PROCEDURE — 80053 COMPREHEN METABOLIC PANEL: CPT

## 2025-03-30 PROCEDURE — 94760 N-INVAS EAR/PLS OXIMETRY 1: CPT

## 2025-03-30 PROCEDURE — 84145 PROCALCITONIN (PCT): CPT

## 2025-03-30 PROCEDURE — 85730 THROMBOPLASTIN TIME PARTIAL: CPT

## 2025-03-30 PROCEDURE — 87040 BLOOD CULTURE FOR BACTERIA: CPT

## 2025-03-30 PROCEDURE — 94002 VENT MGMT INPAT INIT DAY: CPT

## 2025-03-30 PROCEDURE — 94640 AIRWAY INHALATION TREATMENT: CPT

## 2025-03-30 PROCEDURE — 99291 CRITICAL CARE FIRST HOUR: CPT

## 2025-03-30 PROCEDURE — 94644 CONT INHLJ TX 1ST HOUR: CPT

## 2025-03-30 PROCEDURE — 82805 BLOOD GASES W/O2 SATURATION: CPT

## 2025-03-30 PROCEDURE — 36415 COLL VENOUS BLD VENIPUNCTURE: CPT

## 2025-03-30 PROCEDURE — 94664 DEMO&/EVAL PT USE INHALER: CPT

## 2025-03-30 PROCEDURE — 85007 BL SMEAR W/DIFF WBC COUNT: CPT

## 2025-03-30 PROCEDURE — 85027 COMPLETE CBC AUTOMATED: CPT

## 2025-03-30 PROCEDURE — 71045 X-RAY EXAM CHEST 1 VIEW: CPT

## 2025-03-30 PROCEDURE — 93005 ELECTROCARDIOGRAM TRACING: CPT

## 2025-03-30 PROCEDURE — 83880 ASSAY OF NATRIURETIC PEPTIDE: CPT

## 2025-03-30 RX ORDER — ALBUTEROL SULFATE 5 MG/ML
10 SOLUTION RESPIRATORY (INHALATION) ONCE
Status: COMPLETED | OUTPATIENT
Start: 2025-03-31 | End: 2025-03-30

## 2025-03-30 RX ORDER — SODIUM CHLORIDE FOR INHALATION 0.9 %
12 VIAL, NEBULIZER (ML) INHALATION ONCE
Status: COMPLETED | OUTPATIENT
Start: 2025-03-31 | End: 2025-03-30

## 2025-03-30 RX ORDER — METHYLPREDNISOLONE SODIUM SUCCINATE 125 MG/2ML
125 INJECTION, POWDER, LYOPHILIZED, FOR SOLUTION INTRAMUSCULAR; INTRAVENOUS ONCE
Status: COMPLETED | OUTPATIENT
Start: 2025-03-31 | End: 2025-03-31

## 2025-03-30 RX ORDER — ALBUTEROL SULFATE 5 MG/ML
SOLUTION RESPIRATORY (INHALATION)
Status: COMPLETED
Start: 2025-03-30 | End: 2025-03-30

## 2025-03-30 RX ADMIN — ALBUTEROL SULFATE 10 MG: 5 SOLUTION RESPIRATORY (INHALATION) at 23:55

## 2025-03-30 RX ADMIN — IPRATROPIUM BROMIDE 1 MG: 0.5 SOLUTION RESPIRATORY (INHALATION) at 23:55

## 2025-03-30 RX ADMIN — ALBUTEROL SULFATE 10 MG: 2.5 SOLUTION RESPIRATORY (INHALATION) at 23:55

## 2025-03-30 RX ADMIN — ISODIUM CHLORIDE 12 ML: 0.03 SOLUTION RESPIRATORY (INHALATION) at 23:54

## 2025-03-30 RX ADMIN — Medication 1 MG: at 23:55

## 2025-03-31 ENCOUNTER — APPOINTMENT (EMERGENCY)
Dept: CT IMAGING | Facility: HOSPITAL | Age: 85
DRG: 870 | End: 2025-03-31
Payer: MEDICARE

## 2025-03-31 ENCOUNTER — APPOINTMENT (INPATIENT)
Dept: RADIOLOGY | Facility: HOSPITAL | Age: 85
DRG: 870 | End: 2025-03-31
Payer: MEDICARE

## 2025-03-31 ENCOUNTER — APPOINTMENT (INPATIENT)
Dept: NON INVASIVE DIAGNOSTICS | Facility: HOSPITAL | Age: 85
DRG: 870 | End: 2025-03-31
Payer: MEDICARE

## 2025-03-31 ENCOUNTER — APPOINTMENT (INPATIENT)
Dept: GASTROENTEROLOGY | Facility: HOSPITAL | Age: 85
DRG: 870 | End: 2025-03-31
Payer: MEDICARE

## 2025-03-31 PROBLEM — J90 PLEURAL EFFUSION: Status: ACTIVE | Noted: 2025-03-31

## 2025-03-31 PROBLEM — I50.30 (HFPEF) HEART FAILURE WITH PRESERVED EJECTION FRACTION (HCC): Status: ACTIVE | Noted: 2025-03-31

## 2025-03-31 PROBLEM — K21.9 GERD (GASTROESOPHAGEAL REFLUX DISEASE): Status: ACTIVE | Noted: 2025-03-31

## 2025-03-31 PROBLEM — R79.89 LACTATE BLOOD INCREASE: Status: ACTIVE | Noted: 2025-03-31

## 2025-03-31 PROBLEM — J18.9 PNEUMONIA: Status: ACTIVE | Noted: 2025-03-31

## 2025-03-31 PROBLEM — J96.01 ACUTE HYPOXEMIC RESPIRATORY FAILURE (HCC): Status: ACTIVE | Noted: 2025-03-31

## 2025-03-31 LAB
ALBUMIN SERPL BCG-MCNC: 2.4 G/DL (ref 3.5–5)
ALBUMIN SERPL BCG-MCNC: 2.8 G/DL (ref 3.5–5)
ALP SERPL-CCNC: 101 U/L (ref 34–104)
ALP SERPL-CCNC: 123 U/L (ref 34–104)
ALT SERPL W P-5'-P-CCNC: 10 U/L (ref 7–52)
ALT SERPL W P-5'-P-CCNC: 21 U/L (ref 7–52)
ANION GAP SERPL CALCULATED.3IONS-SCNC: 10 MMOL/L (ref 4–13)
ANION GAP SERPL CALCULATED.3IONS-SCNC: 13 MMOL/L (ref 4–13)
AORTIC ROOT: 3.8 CM
AORTIC VALVE MEAN VELOCITY: 13.6 M/S
APPEARANCE FLD: ABNORMAL
APTT PPP: 36 SECONDS (ref 23–34)
AST SERPL W P-5'-P-CCNC: 30 U/L (ref 13–39)
AST SERPL W P-5'-P-CCNC: 46 U/L (ref 13–39)
ATRIAL RATE: 120 BPM
AV AREA BY CONTINUOUS VTI: 1.4 CM2
AV AREA PEAK VELOCITY: 1.3 CM2
AV LVOT MEAN GRADIENT: 1 MMHG
AV LVOT PEAK GRADIENT: 3 MMHG
AV MEAN PRESS GRAD SYS DOP V1V2: 9 MMHG
AV ORIFICE AREA US: 1.56 CM2
AV PEAK GRADIENT: 16 MMHG
AV VELOCITY RATIO: 0.45
AV VMAX SYS DOP: 2.03 M/S
BACTERIA UR QL AUTO: ABNORMAL /HPF
BASE EX.OXY STD BLDV CALC-SCNC: 49.6 % (ref 60–80)
BASE EXCESS BLDV CALC-SCNC: 2.9 MMOL/L
BASOPHILS # BLD MANUAL: 0 THOUSAND/UL (ref 0–0.1)
BASOPHILS # BLD MANUAL: 0.47 THOUSAND/UL (ref 0–0.1)
BASOPHILS NFR MAR MANUAL: 0 % (ref 0–1)
BASOPHILS NFR MAR MANUAL: 1 % (ref 0–1)
BILIRUB SERPL-MCNC: 0.62 MG/DL (ref 0.2–1)
BILIRUB SERPL-MCNC: 0.92 MG/DL (ref 0.2–1)
BILIRUB UR QL STRIP: NEGATIVE
BNP SERPL-MCNC: 191 PG/ML (ref 0–100)
BSA FOR ECHO PROCEDURE: 1.97 M2
BUN SERPL-MCNC: 30 MG/DL (ref 5–25)
BUN SERPL-MCNC: 34 MG/DL (ref 5–25)
CALCIUM ALBUM COR SERPL-MCNC: 9.6 MG/DL (ref 8.3–10.1)
CALCIUM ALBUM COR SERPL-MCNC: 9.9 MG/DL (ref 8.3–10.1)
CALCIUM SERPL-MCNC: 8.3 MG/DL (ref 8.4–10.2)
CALCIUM SERPL-MCNC: 8.9 MG/DL (ref 8.4–10.2)
CHLORIDE SERPL-SCNC: 89 MMOL/L (ref 96–108)
CHLORIDE SERPL-SCNC: 95 MMOL/L (ref 96–108)
CLARITY UR: ABNORMAL
CO2 SERPL-SCNC: 27 MMOL/L (ref 21–32)
CO2 SERPL-SCNC: 30 MMOL/L (ref 21–32)
COLOR FLD: ABNORMAL
COLOR UR: YELLOW
CREAT SERPL-MCNC: 1.92 MG/DL (ref 0.6–1.3)
CREAT SERPL-MCNC: 2.06 MG/DL (ref 0.6–1.3)
DOP CALC AO VTI: 26.41 CM
DOP CALC LVOT AREA: 3.46 CM2
DOP CALC LVOT CARDIAC INDEX: 1.91 L/MIN/M2
DOP CALC LVOT CARDIAC OUTPUT: 3.79 L/MIN
DOP CALC LVOT DIAMETER: 2.1 CM
DOP CALC LVOT PEAK VEL VTI: 11.89 CM
DOP CALC LVOT PEAK VEL: 0.87 M/S
DOP CALC LVOT STROKE INDEX: 19.2 ML/M2
DOP CALC LVOT STROKE VOLUME: 41.16 CM3
EOSINOPHIL # BLD MANUAL: 0 THOUSAND/UL (ref 0–0.4)
EOSINOPHIL # BLD MANUAL: 0 THOUSAND/UL (ref 0–0.4)
EOSINOPHIL NFR BLD MANUAL: 0 % (ref 0–6)
EOSINOPHIL NFR BLD MANUAL: 0 % (ref 0–6)
ERYTHROCYTE [DISTWIDTH] IN BLOOD BY AUTOMATED COUNT: 13.5 % (ref 11.6–15.1)
ERYTHROCYTE [DISTWIDTH] IN BLOOD BY AUTOMATED COUNT: 13.7 % (ref 11.6–15.1)
FLUAV RNA RESP QL NAA+PROBE: NEGATIVE
FLUBV RNA RESP QL NAA+PROBE: NEGATIVE
FRACTIONAL SHORTENING: 31 (ref 28–44)
GFR SERPL CREATININE-BSD FRML MDRD: 28 ML/MIN/1.73SQ M
GFR SERPL CREATININE-BSD FRML MDRD: 31 ML/MIN/1.73SQ M
GLUCOSE FLD-MCNC: <10 MG/DL
GLUCOSE SERPL-MCNC: 172 MG/DL (ref 65–140)
GLUCOSE SERPL-MCNC: 203 MG/DL (ref 65–140)
GLUCOSE UR STRIP-MCNC: NEGATIVE MG/DL
HCO3 BLDV-SCNC: 31 MMOL/L (ref 24–30)
HCT VFR BLD AUTO: 35.9 % (ref 36.5–49.3)
HCT VFR BLD AUTO: 42 % (ref 36.5–49.3)
HGB BLD-MCNC: 11.7 G/DL (ref 12–17)
HGB BLD-MCNC: 13.3 G/DL (ref 12–17)
HGB UR QL STRIP.AUTO: ABNORMAL
HISTIOCYTES NFR FLD: 2 %
HYALINE CASTS #/AREA URNS LPF: ABNORMAL /LPF
INR PPP: 1.33 (ref 0.85–1.19)
INTERVENTRICULAR SEPTUM IN DIASTOLE (PARASTERNAL SHORT AXIS VIEW): 1.1 CM
INTERVENTRICULAR SEPTUM: 1.1 CM (ref 0.6–1.1)
KETONES UR STRIP-MCNC: NEGATIVE MG/DL
L PNEUMO1 AG UR QL IA.RAPID: NEGATIVE
LAAS-AP4: 9.3 CM2
LACTATE SERPL-SCNC: 2.3 MMOL/L (ref 0.5–2)
LACTATE SERPL-SCNC: 2.6 MMOL/L (ref 0.5–2)
LACTATE SERPL-SCNC: 3.3 MMOL/L (ref 0.5–2)
LACTATE SERPL-SCNC: 3.5 MMOL/L (ref 0.5–2)
LACTATE SERPL-SCNC: 3.9 MMOL/L (ref 0.5–2)
LACTATE SERPL-SCNC: 4 MMOL/L (ref 0.5–2)
LDH FLD L TO P-CCNC: 1920 U/L
LDH SERPL-CCNC: 136 U/L (ref 140–271)
LEFT ATRIUM SIZE: 3 CM
LEFT INTERNAL DIMENSION IN SYSTOLE: 2.9 CM (ref 2.1–4)
LEFT VENTRICULAR INTERNAL DIMENSION IN DIASTOLE: 4.2 CM (ref 3.5–6)
LEFT VENTRICULAR POSTERIOR WALL IN END DIASTOLE: 0.9 CM
LEFT VENTRICULAR STROKE VOLUME: 45 ML
LEUKOCYTE ESTERASE UR QL STRIP: ABNORMAL
LG PLATELETS BLD QL SMEAR: PRESENT
LV EF US.2D.A4C+ESTIMATED: 74 %
LVSV (TEICH): 45 ML
LYMPHOCYTES # BLD AUTO: 0.47 THOUSAND/UL (ref 0.6–4.47)
LYMPHOCYTES # BLD AUTO: 0.62 THOUSAND/UL (ref 0.6–4.47)
LYMPHOCYTES # BLD AUTO: 1 % (ref 14–44)
LYMPHOCYTES # BLD AUTO: 1 % (ref 14–44)
LYMPHOCYTES NFR BLD AUTO: 5 %
MCH RBC QN AUTO: 27.3 PG (ref 26.8–34.3)
MCH RBC QN AUTO: 27.9 PG (ref 26.8–34.3)
MCHC RBC AUTO-ENTMCNC: 31.7 G/DL (ref 31.4–37.4)
MCHC RBC AUTO-ENTMCNC: 32.6 G/DL (ref 31.4–37.4)
MCV RBC AUTO: 86 FL (ref 82–98)
MCV RBC AUTO: 86 FL (ref 82–98)
METAMYELOCYTE ABSOLUTE CT: 1.24 THOUSAND/UL (ref 0–0.1)
METAMYELOCYTES NFR BLD MANUAL: 2 % (ref 0–1)
MICROCYTES BLD QL AUTO: PRESENT
MONOCYTES # BLD AUTO: 1.88 THOUSAND/UL (ref 0–1.22)
MONOCYTES # BLD AUTO: 2.48 THOUSAND/UL (ref 0–1.22)
MONOCYTES NFR BLD: 4 % (ref 4–12)
MONOCYTES NFR BLD: 4 % (ref 4–12)
MUCOUS THREADS UR QL AUTO: ABNORMAL
NEUTROPHILS # BLD MANUAL: 44.13 THOUSAND/UL (ref 1.85–7.62)
NEUTROPHILS # BLD MANUAL: 57.77 THOUSAND/UL (ref 1.85–7.62)
NEUTS BAND NFR BLD MANUAL: 2 % (ref 0–8)
NEUTS BAND NFR BLD MANUAL: 21 % (ref 0–8)
NEUTS SEG NFR BLD AUTO: 72 % (ref 43–75)
NEUTS SEG NFR BLD AUTO: 92 % (ref 43–75)
NEUTS SEG NFR BLD AUTO: 93 %
NITRITE UR QL STRIP: NEGATIVE
NON-SQ EPI CELLS URNS QL MICRO: ABNORMAL /HPF
O2 CT BLDV-SCNC: 10 ML/DL
OVALOCYTES BLD QL SMEAR: PRESENT
P AXIS: 89 DEGREES
PCO2 BLDV: 63.1 MM HG (ref 42–50)
PH BLDV: 7.31 [PH] (ref 7.3–7.4)
PH BODY FLUID: 7.4
PH UR STRIP.AUTO: 5 [PH]
PLATELET # BLD AUTO: 522 THOUSANDS/UL (ref 149–390)
PLATELET # BLD AUTO: 630 THOUSANDS/UL (ref 149–390)
PLATELET # BLD AUTO: 661 THOUSANDS/UL (ref 149–390)
PLATELET BLD QL SMEAR: ABNORMAL
PLATELET BLD QL SMEAR: ABNORMAL
PLATELET CLUMP BLD QL SMEAR: PRESENT
PMV BLD AUTO: 9.4 FL (ref 8.9–12.7)
PMV BLD AUTO: 9.6 FL (ref 8.9–12.7)
PMV BLD AUTO: 9.8 FL (ref 8.9–12.7)
PO2 BLDV: 30.4 MM HG (ref 35–45)
POLYCHROMASIA BLD QL SMEAR: PRESENT
POLYCHROMASIA BLD QL SMEAR: PRESENT
POTASSIUM SERPL-SCNC: 4.3 MMOL/L (ref 3.5–5.3)
POTASSIUM SERPL-SCNC: 4.5 MMOL/L (ref 3.5–5.3)
PR INTERVAL: 172 MS
PROCALCITONIN SERPL-MCNC: 14.84 NG/ML
PROCALCITONIN SERPL-MCNC: 3.66 NG/ML
PROT FLD-MCNC: 4.4 G/DL
PROT SERPL-MCNC: 6.8 G/DL (ref 6.4–8.4)
PROT SERPL-MCNC: 7.7 G/DL (ref 6.4–8.4)
PROT UR STRIP-MCNC: ABNORMAL MG/DL
PROTHROMBIN TIME: 16.6 SECONDS (ref 12.3–15)
QRS AXIS: 146 DEGREES
QRSD INTERVAL: 80 MS
QT INTERVAL: 322 MS
QTC INTERVAL: 455 MS
RBC # BLD AUTO: 4.19 MILLION/UL (ref 3.88–5.62)
RBC # BLD AUTO: 4.88 MILLION/UL (ref 3.88–5.62)
RBC #/AREA URNS AUTO: ABNORMAL /HPF
RBC MORPH BLD: PRESENT
RBC MORPH BLD: PRESENT
RIGHT ATRIAL 2D VOLUME: 23 ML
RIGHT ATRIUM AREA SYSTOLE A4C: 11 CM2
RIGHT VENTRICLE ID DIMENSION: 4 CM
RSV RNA RESP QL NAA+PROBE: NEGATIVE
S PNEUM AG UR QL: NEGATIVE
SARS-COV-2 RNA RESP QL NAA+PROBE: NEGATIVE
SITE: ABNORMAL
SL CV LV EF: 73
SL CV PED ECHO LEFT VENTRICLE DIASTOLIC VOLUME (MOD BIPLANE) 2D: 77 ML
SL CV PED ECHO LEFT VENTRICLE SYSTOLIC VOLUME (MOD BIPLANE) 2D: 32 ML
SODIUM SERPL-SCNC: 132 MMOL/L (ref 135–147)
SODIUM SERPL-SCNC: 132 MMOL/L (ref 135–147)
SP GR UR STRIP.AUTO: 1.02 (ref 1–1.03)
T WAVE AXIS: 54 DEGREES
TARGETS BLD QL SMEAR: PRESENT
TOTAL CELLS COUNTED SPEC: 100
TRICUSPID ANNULAR PLANE SYSTOLIC EXCURSION: 2 CM
UROBILINOGEN UR STRIP-ACNC: <2 MG/DL
VENTRICULAR RATE: 120 BPM
WBC # BLD AUTO: 46.95 THOUSAND/UL (ref 4.31–10.16)
WBC # BLD AUTO: 62.12 THOUSAND/UL (ref 4.31–10.16)
WBC # FLD MANUAL: ABNORMAL /UL
WBC #/AREA URNS AUTO: ABNORMAL /HPF

## 2025-03-31 PROCEDURE — 81001 URINALYSIS AUTO W/SCOPE: CPT | Performed by: NURSE PRACTITIONER

## 2025-03-31 PROCEDURE — 83605 ASSAY OF LACTIC ACID: CPT

## 2025-03-31 PROCEDURE — 87102 FUNGUS ISOLATION CULTURE: CPT | Performed by: STUDENT IN AN ORGANIZED HEALTH CARE EDUCATION/TRAINING PROGRAM

## 2025-03-31 PROCEDURE — 93306 TTE W/DOPPLER COMPLETE: CPT | Performed by: STUDENT IN AN ORGANIZED HEALTH CARE EDUCATION/TRAINING PROGRAM

## 2025-03-31 PROCEDURE — 32557 INSERT CATH PLEURA W/ IMAGE: CPT | Performed by: EMERGENCY MEDICINE

## 2025-03-31 PROCEDURE — 87086 URINE CULTURE/COLONY COUNT: CPT | Performed by: NURSE PRACTITIONER

## 2025-03-31 PROCEDURE — 87449 NOS EACH ORGANISM AG IA: CPT | Performed by: NURSE PRACTITIONER

## 2025-03-31 PROCEDURE — 31500 INSERT EMERGENCY AIRWAY: CPT | Performed by: EMERGENCY MEDICINE

## 2025-03-31 PROCEDURE — 0BC38ZZ EXTIRPATION OF MATTER FROM RIGHT MAIN BRONCHUS, VIA NATURAL OR ARTIFICIAL OPENING ENDOSCOPIC: ICD-10-PCS | Performed by: EMERGENCY MEDICINE

## 2025-03-31 PROCEDURE — 80053 COMPREHEN METABOLIC PANEL: CPT

## 2025-03-31 PROCEDURE — 94002 VENT MGMT INPAT INIT DAY: CPT

## 2025-03-31 PROCEDURE — 87798 DETECT AGENT NOS DNA AMP: CPT | Performed by: STUDENT IN AN ORGANIZED HEALTH CARE EDUCATION/TRAINING PROGRAM

## 2025-03-31 PROCEDURE — 83986 ASSAY PH BODY FLUID NOS: CPT

## 2025-03-31 PROCEDURE — 87077 CULTURE AEROBIC IDENTIFY: CPT

## 2025-03-31 PROCEDURE — 87081 CULTURE SCREEN ONLY: CPT | Performed by: NURSE PRACTITIONER

## 2025-03-31 PROCEDURE — 87181 SC STD AGAR DILUTION PER AGT: CPT

## 2025-03-31 PROCEDURE — 0B938ZX DRAINAGE OF RIGHT MAIN BRONCHUS, VIA NATURAL OR ARTIFICIAL OPENING ENDOSCOPIC, DIAGNOSTIC: ICD-10-PCS | Performed by: EMERGENCY MEDICINE

## 2025-03-31 PROCEDURE — 0BC78ZZ EXTIRPATION OF MATTER FROM LEFT MAIN BRONCHUS, VIA NATURAL OR ARTIFICIAL OPENING ENDOSCOPIC: ICD-10-PCS | Performed by: EMERGENCY MEDICINE

## 2025-03-31 PROCEDURE — 5A1955Z RESPIRATORY VENTILATION, GREATER THAN 96 CONSECUTIVE HOURS: ICD-10-PCS | Performed by: EMERGENCY MEDICINE

## 2025-03-31 PROCEDURE — 71045 X-RAY EXAM CHEST 1 VIEW: CPT

## 2025-03-31 PROCEDURE — 85049 AUTOMATED PLATELET COUNT: CPT | Performed by: NURSE PRACTITIONER

## 2025-03-31 PROCEDURE — 93010 ELECTROCARDIOGRAM REPORT: CPT | Performed by: INTERNAL MEDICINE

## 2025-03-31 PROCEDURE — 87070 CULTURE OTHR SPECIMN AEROBIC: CPT | Performed by: STUDENT IN AN ORGANIZED HEALTH CARE EDUCATION/TRAINING PROGRAM

## 2025-03-31 PROCEDURE — 87206 SMEAR FLUORESCENT/ACID STAI: CPT | Performed by: STUDENT IN AN ORGANIZED HEALTH CARE EDUCATION/TRAINING PROGRAM

## 2025-03-31 PROCEDURE — 87040 BLOOD CULTURE FOR BACTERIA: CPT

## 2025-03-31 PROCEDURE — 87205 SMEAR GRAM STAIN: CPT | Performed by: STUDENT IN AN ORGANIZED HEALTH CARE EDUCATION/TRAINING PROGRAM

## 2025-03-31 PROCEDURE — 93306 TTE W/DOPPLER COMPLETE: CPT

## 2025-03-31 PROCEDURE — NC001 PR NO CHARGE: Performed by: EMERGENCY MEDICINE

## 2025-03-31 PROCEDURE — 94640 AIRWAY INHALATION TREATMENT: CPT

## 2025-03-31 PROCEDURE — 89051 BODY FLUID CELL COUNT: CPT

## 2025-03-31 PROCEDURE — 87205 SMEAR GRAM STAIN: CPT

## 2025-03-31 PROCEDURE — 96365 THER/PROPH/DIAG IV INF INIT: CPT

## 2025-03-31 PROCEDURE — 94664 DEMO&/EVAL PT USE INHALER: CPT

## 2025-03-31 PROCEDURE — 88305 TISSUE EXAM BY PATHOLOGIST: CPT | Performed by: STUDENT IN AN ORGANIZED HEALTH CARE EDUCATION/TRAINING PROGRAM

## 2025-03-31 PROCEDURE — 71250 CT THORAX DX C-: CPT

## 2025-03-31 PROCEDURE — 94760 N-INVAS EAR/PLS OXIMETRY 1: CPT

## 2025-03-31 PROCEDURE — 84145 PROCALCITONIN (PCT): CPT

## 2025-03-31 PROCEDURE — 0W9930Z DRAINAGE OF RIGHT PLEURAL CAVITY WITH DRAINAGE DEVICE, PERCUTANEOUS APPROACH: ICD-10-PCS | Performed by: EMERGENCY MEDICINE

## 2025-03-31 PROCEDURE — 36415 COLL VENOUS BLD VENIPUNCTURE: CPT

## 2025-03-31 PROCEDURE — 87070 CULTURE OTHR SPECIMN AEROBIC: CPT

## 2025-03-31 PROCEDURE — 88112 CYTOPATH CELL ENHANCE TECH: CPT | Performed by: STUDENT IN AN ORGANIZED HEALTH CARE EDUCATION/TRAINING PROGRAM

## 2025-03-31 PROCEDURE — 0BH17EZ INSERTION OF ENDOTRACHEAL AIRWAY INTO TRACHEA, VIA NATURAL OR ARTIFICIAL OPENING: ICD-10-PCS | Performed by: EMERGENCY MEDICINE

## 2025-03-31 PROCEDURE — 87147 CULTURE TYPE IMMUNOLOGIC: CPT | Performed by: NURSE PRACTITIONER

## 2025-03-31 PROCEDURE — 87116 MYCOBACTERIA CULTURE: CPT | Performed by: STUDENT IN AN ORGANIZED HEALTH CARE EDUCATION/TRAINING PROGRAM

## 2025-03-31 PROCEDURE — 96375 TX/PRO/DX INJ NEW DRUG ADDON: CPT

## 2025-03-31 PROCEDURE — 96374 THER/PROPH/DIAG INJ IV PUSH: CPT

## 2025-03-31 PROCEDURE — 85027 COMPLETE CBC AUTOMATED: CPT

## 2025-03-31 PROCEDURE — 84157 ASSAY OF PROTEIN OTHER: CPT

## 2025-03-31 PROCEDURE — 99291 CRITICAL CARE FIRST HOUR: CPT | Performed by: EMERGENCY MEDICINE

## 2025-03-31 PROCEDURE — 0241U HB NFCT DS VIR RESP RNA 4 TRGT: CPT

## 2025-03-31 PROCEDURE — 83615 LACTATE (LD) (LDH) ENZYME: CPT

## 2025-03-31 PROCEDURE — 94150 VITAL CAPACITY TEST: CPT

## 2025-03-31 PROCEDURE — 0B978ZX DRAINAGE OF LEFT MAIN BRONCHUS, VIA NATURAL OR ARTIFICIAL OPENING ENDOSCOPIC, DIAGNOSTIC: ICD-10-PCS | Performed by: EMERGENCY MEDICINE

## 2025-03-31 PROCEDURE — 96361 HYDRATE IV INFUSION ADD-ON: CPT

## 2025-03-31 PROCEDURE — 82945 GLUCOSE OTHER FLUID: CPT

## 2025-03-31 PROCEDURE — 85007 BL SMEAR W/DIFF WBC COUNT: CPT

## 2025-03-31 PROCEDURE — 87147 CULTURE TYPE IMMUNOLOGIC: CPT | Performed by: STUDENT IN AN ORGANIZED HEALTH CARE EDUCATION/TRAINING PROGRAM

## 2025-03-31 PROCEDURE — 94660 CPAP INITIATION&MGMT: CPT

## 2025-03-31 RX ORDER — ROPINIROLE 0.25 MG/1
0.5 TABLET, FILM COATED ORAL
Status: DISCONTINUED | OUTPATIENT
Start: 2025-03-31 | End: 2025-04-06

## 2025-03-31 RX ORDER — SUCCINYLCHOLINE/SOD CL,ISO/PF 100 MG/5ML
100 SYRINGE (ML) INTRAVENOUS ONCE
Status: COMPLETED | OUTPATIENT
Start: 2025-03-31 | End: 2025-03-31

## 2025-03-31 RX ORDER — LORAZEPAM 2 MG/ML
0.5 INJECTION INTRAMUSCULAR ONCE
Status: COMPLETED | OUTPATIENT
Start: 2025-03-31 | End: 2025-03-31

## 2025-03-31 RX ORDER — PROPOFOL 10 MG/ML
5-50 INJECTION, EMULSION INTRAVENOUS
Status: DISCONTINUED | OUTPATIENT
Start: 2025-03-31 | End: 2025-04-01

## 2025-03-31 RX ORDER — CARBIDOPA AND LEVODOPA 25; 100 MG/1; MG/1
1 TABLET ORAL 3 TIMES DAILY
Status: DISCONTINUED | OUTPATIENT
Start: 2025-03-31 | End: 2025-04-06

## 2025-03-31 RX ORDER — SODIUM CHLORIDE, SODIUM LACTATE, POTASSIUM CHLORIDE, CALCIUM CHLORIDE 600; 310; 30; 20 MG/100ML; MG/100ML; MG/100ML; MG/100ML
75 INJECTION, SOLUTION INTRAVENOUS CONTINUOUS
Status: DISCONTINUED | OUTPATIENT
Start: 2025-03-31 | End: 2025-04-03

## 2025-03-31 RX ORDER — ESCITALOPRAM OXALATE 5 MG/1
5 TABLET ORAL DAILY
COMMUNITY
End: 2025-04-07

## 2025-03-31 RX ORDER — FLUTICASONE PROPIONATE AND SALMETEROL 250; 50 UG/1; UG/1
1 POWDER RESPIRATORY (INHALATION) 2 TIMES DAILY
COMMUNITY
End: 2025-04-07

## 2025-03-31 RX ORDER — ESCITALOPRAM OXALATE 10 MG/1
5 TABLET ORAL DAILY
Status: DISCONTINUED | OUTPATIENT
Start: 2025-03-31 | End: 2025-04-06

## 2025-03-31 RX ORDER — ROPINIROLE 0.5 MG/1
0.5 TABLET, FILM COATED ORAL
COMMUNITY
End: 2025-04-07

## 2025-03-31 RX ORDER — ALBUMIN HUMAN 50 G/1000ML
12.5 SOLUTION INTRAVENOUS ONCE
Status: COMPLETED | OUTPATIENT
Start: 2025-03-31 | End: 2025-03-31

## 2025-03-31 RX ORDER — BUDESONIDE 0.25 MG/2ML
0.25 INHALANT ORAL
Status: DISCONTINUED | OUTPATIENT
Start: 2025-03-31 | End: 2025-04-06

## 2025-03-31 RX ORDER — LEVALBUTEROL INHALATION SOLUTION 1.25 MG/3ML
1.25 SOLUTION RESPIRATORY (INHALATION)
Status: DISCONTINUED | OUTPATIENT
Start: 2025-03-31 | End: 2025-04-06

## 2025-03-31 RX ORDER — TORSEMIDE 20 MG/1
20 TABLET ORAL DAILY
Status: DISCONTINUED | OUTPATIENT
Start: 2025-03-31 | End: 2025-04-05

## 2025-03-31 RX ORDER — BUDESONIDE 0.25 MG/2ML
0.25 INHALANT ORAL 2 TIMES DAILY
Status: DISCONTINUED | OUTPATIENT
Start: 2025-03-31 | End: 2025-03-31

## 2025-03-31 RX ORDER — ETOMIDATE 2 MG/ML
0.3 INJECTION INTRAVENOUS ONCE
Status: DISCONTINUED | OUTPATIENT
Start: 2025-03-31 | End: 2025-03-31

## 2025-03-31 RX ORDER — HEPARIN SODIUM 5000 [USP'U]/ML
5000 INJECTION, SOLUTION INTRAVENOUS; SUBCUTANEOUS EVERY 8 HOURS SCHEDULED
Status: DISCONTINUED | OUTPATIENT
Start: 2025-03-31 | End: 2025-04-05

## 2025-03-31 RX ORDER — SODIUM CHLORIDE FOR INHALATION 3 %
4 VIAL, NEBULIZER (ML) INHALATION
Status: COMPLETED | OUTPATIENT
Start: 2025-03-31 | End: 2025-04-02

## 2025-03-31 RX ORDER — MIRTAZAPINE 15 MG/1
15 TABLET, FILM COATED ORAL
Status: DISCONTINUED | OUTPATIENT
Start: 2025-03-31 | End: 2025-04-06

## 2025-03-31 RX ORDER — ETOMIDATE 2 MG/ML
20 INJECTION INTRAVENOUS ONCE
Status: COMPLETED | OUTPATIENT
Start: 2025-03-31 | End: 2025-03-31

## 2025-03-31 RX ORDER — MIRTAZAPINE 15 MG/1
15 TABLET, ORALLY DISINTEGRATING ORAL
COMMUNITY
End: 2025-04-07

## 2025-03-31 RX ORDER — LIDOCAINE HYDROCHLORIDE 10 MG/ML
INJECTION, SOLUTION EPIDURAL; INFILTRATION; INTRACAUDAL; PERINEURAL
Status: COMPLETED
Start: 2025-03-31 | End: 2025-03-31

## 2025-03-31 RX ORDER — AMOXICILLIN 250 MG
1 CAPSULE ORAL
Status: DISCONTINUED | OUTPATIENT
Start: 2025-03-31 | End: 2025-04-06

## 2025-03-31 RX ORDER — ERGOCALCIFEROL 1.25 MG/1
50000 CAPSULE, LIQUID FILLED ORAL WEEKLY
Status: DISCONTINUED | OUTPATIENT
Start: 2025-03-31 | End: 2025-04-03

## 2025-03-31 RX ORDER — CHLORHEXIDINE GLUCONATE ORAL RINSE 1.2 MG/ML
15 SOLUTION DENTAL EVERY 12 HOURS SCHEDULED
Status: DISCONTINUED | OUTPATIENT
Start: 2025-03-31 | End: 2025-04-07 | Stop reason: HOSPADM

## 2025-03-31 RX ORDER — PANTOPRAZOLE SODIUM 40 MG/1
40 TABLET, DELAYED RELEASE ORAL
Status: DISCONTINUED | OUTPATIENT
Start: 2025-03-31 | End: 2025-04-02

## 2025-03-31 RX ORDER — LEVALBUTEROL INHALATION SOLUTION 1.25 MG/3ML
1.25 SOLUTION RESPIRATORY (INHALATION) EVERY 8 HOURS
Status: DISCONTINUED | OUTPATIENT
Start: 2025-03-31 | End: 2025-03-31

## 2025-03-31 RX ORDER — SODIUM CHLORIDE, SODIUM GLUCONATE, SODIUM ACETATE, POTASSIUM CHLORIDE, MAGNESIUM CHLORIDE, SODIUM PHOSPHATE, DIBASIC, AND POTASSIUM PHOSPHATE .53; .5; .37; .037; .03; .012; .00082 G/100ML; G/100ML; G/100ML; G/100ML; G/100ML; G/100ML; G/100ML
1000 INJECTION, SOLUTION INTRAVENOUS ONCE
Status: COMPLETED | OUTPATIENT
Start: 2025-03-31 | End: 2025-03-31

## 2025-03-31 RX ADMIN — CARBIDOPA AND LEVODOPA 1 TABLET: 25; 100 TABLET ORAL at 21:24

## 2025-03-31 RX ADMIN — LEVALBUTEROL HYDROCHLORIDE 1.25 MG: 1.25 SOLUTION RESPIRATORY (INHALATION) at 19:22

## 2025-03-31 RX ADMIN — SODIUM CHLORIDE SOLN NEBU 3% 4 ML: 3 NEBU SOLN at 19:22

## 2025-03-31 RX ADMIN — BUDESONIDE 0.25 MG: 0.25 INHALANT RESPIRATORY (INHALATION) at 19:22

## 2025-03-31 RX ADMIN — LEVALBUTEROL HYDROCHLORIDE 1.25 MG: 1.25 SOLUTION RESPIRATORY (INHALATION) at 13:25

## 2025-03-31 RX ADMIN — CARBIDOPA AND LEVODOPA 1 TABLET: 25; 100 TABLET ORAL at 11:26

## 2025-03-31 RX ADMIN — SODIUM CHLORIDE 500 ML: 0.9 INJECTION, SOLUTION INTRAVENOUS at 00:11

## 2025-03-31 RX ADMIN — HEPARIN SODIUM 5000 UNITS: 5000 INJECTION INTRAVENOUS; SUBCUTANEOUS at 05:49

## 2025-03-31 RX ADMIN — SENNOSIDES AND DOCUSATE SODIUM 1 TABLET: 50; 8.6 TABLET ORAL at 21:24

## 2025-03-31 RX ADMIN — ESCITALOPRAM OXALATE 5 MG: 10 TABLET ORAL at 11:25

## 2025-03-31 RX ADMIN — SODIUM CHLORIDE, SODIUM LACTATE, POTASSIUM CHLORIDE, AND CALCIUM CHLORIDE 75 ML/HR: .6; .31; .03; .02 INJECTION, SOLUTION INTRAVENOUS at 21:23

## 2025-03-31 RX ADMIN — LORAZEPAM 0.5 MG: 2 INJECTION INTRAMUSCULAR; INTRAVENOUS at 00:16

## 2025-03-31 RX ADMIN — SODIUM CHLORIDE SOLN NEBU 3% 4 ML: 3 NEBU SOLN at 09:50

## 2025-03-31 RX ADMIN — CEFEPIME 1000 MG: 1 INJECTION, POWDER, FOR SOLUTION INTRAMUSCULAR; INTRAVENOUS at 18:35

## 2025-03-31 RX ADMIN — CEFTRIAXONE 2000 MG: 10 INJECTION, POWDER, FOR SOLUTION INTRAVENOUS at 00:02

## 2025-03-31 RX ADMIN — AZITHROMYCIN MONOHYDRATE 500 MG: 500 INJECTION, POWDER, LYOPHILIZED, FOR SOLUTION INTRAVENOUS at 00:45

## 2025-03-31 RX ADMIN — HEPARIN SODIUM 5000 UNITS: 5000 INJECTION INTRAVENOUS; SUBCUTANEOUS at 16:11

## 2025-03-31 RX ADMIN — IPRATROPIUM BROMIDE 0.5 MG: 0.5 SOLUTION RESPIRATORY (INHALATION) at 13:25

## 2025-03-31 RX ADMIN — IPRATROPIUM BROMIDE 0.5 MG: 0.5 SOLUTION RESPIRATORY (INHALATION) at 07:28

## 2025-03-31 RX ADMIN — CHLORHEXIDINE GLUCONATE 0.12% ORAL RINSE 15 ML: 1.2 LIQUID ORAL at 09:44

## 2025-03-31 RX ADMIN — SODIUM CHLORIDE, SODIUM LACTATE, POTASSIUM CHLORIDE, AND CALCIUM CHLORIDE 75 ML/HR: .6; .31; .03; .02 INJECTION, SOLUTION INTRAVENOUS at 08:16

## 2025-03-31 RX ADMIN — MIRTAZAPINE 15 MG: 15 TABLET, FILM COATED ORAL at 21:24

## 2025-03-31 RX ADMIN — ROPINIROLE HYDROCHLORIDE 0.5 MG: 0.25 TABLET, FILM COATED ORAL at 21:24

## 2025-03-31 RX ADMIN — LIDOCAINE HYDROCHLORIDE 300 MG: 10 INJECTION, SOLUTION EPIDURAL; INFILTRATION; INTRACAUDAL; PERINEURAL at 16:52

## 2025-03-31 RX ADMIN — SODIUM CHLORIDE, SODIUM LACTATE, POTASSIUM CHLORIDE, AND CALCIUM CHLORIDE 1000 ML: .6; .31; .03; .02 INJECTION, SOLUTION INTRAVENOUS at 01:04

## 2025-03-31 RX ADMIN — SODIUM CHLORIDE SOLN NEBU 3% 4 ML: 3 NEBU SOLN at 13:25

## 2025-03-31 RX ADMIN — ALBUMIN (HUMAN) 12.5 G: 12.5 INJECTION, SOLUTION INTRAVENOUS at 16:36

## 2025-03-31 RX ADMIN — BUDESONIDE 0.25 MG: 0.25 INHALANT RESPIRATORY (INHALATION) at 07:28

## 2025-03-31 RX ADMIN — NOREPINEPHRINE BITARTRATE 1 MCG/MIN: 1 INJECTION INTRAVENOUS at 21:23

## 2025-03-31 RX ADMIN — IPRATROPIUM BROMIDE 0.5 MG: 0.5 SOLUTION RESPIRATORY (INHALATION) at 19:22

## 2025-03-31 RX ADMIN — LEVALBUTEROL HYDROCHLORIDE 1.25 MG: 1.25 SOLUTION RESPIRATORY (INHALATION) at 07:29

## 2025-03-31 RX ADMIN — CARBIDOPA AND LEVODOPA 1 TABLET: 25; 100 TABLET ORAL at 16:11

## 2025-03-31 RX ADMIN — SODIUM CHLORIDE, SODIUM GLUCONATE, SODIUM ACETATE, POTASSIUM CHLORIDE, MAGNESIUM CHLORIDE, SODIUM PHOSPHATE, DIBASIC, AND POTASSIUM PHOSPHATE 1000 ML: .53; .5; .37; .037; .03; .012; .00082 INJECTION, SOLUTION INTRAVENOUS at 19:47

## 2025-03-31 RX ADMIN — Medication 100 MG: at 10:57

## 2025-03-31 RX ADMIN — PROPOFOL 20 MCG/KG/MIN: 10 INJECTION, EMULSION INTRAVENOUS at 18:32

## 2025-03-31 RX ADMIN — METHYLPREDNISOLONE SODIUM SUCCINATE 125 MG: 125 INJECTION, POWDER, FOR SOLUTION INTRAMUSCULAR; INTRAVENOUS at 00:02

## 2025-03-31 RX ADMIN — PROPOFOL 15 MCG/KG/MIN: 10 INJECTION, EMULSION INTRAVENOUS at 10:57

## 2025-03-31 RX ADMIN — VANCOMYCIN HYDROCHLORIDE 2000 MG: 1 INJECTION, POWDER, LYOPHILIZED, FOR SOLUTION INTRAVENOUS at 01:55

## 2025-03-31 RX ADMIN — CHLORHEXIDINE GLUCONATE 0.12% ORAL RINSE 15 ML: 1.2 LIQUID ORAL at 21:23

## 2025-03-31 RX ADMIN — HEPARIN SODIUM 5000 UNITS: 5000 INJECTION INTRAVENOUS; SUBCUTANEOUS at 21:24

## 2025-03-31 RX ADMIN — CEFEPIME 1000 MG: 1 INJECTION, POWDER, FOR SOLUTION INTRAMUSCULAR; INTRAVENOUS at 05:49

## 2025-03-31 RX ADMIN — ETOMIDATE 20 MG: 2 INJECTION INTRAVENOUS at 10:57

## 2025-03-31 NOTE — SEPSIS NOTE
"  Sepsis Note   Den Warren 84 y.o. male MRN: [id number]  Unit/Bed#: ICU 04 Encounter: [id number]       Initial Sepsis Screening       Row Name 03/31/25 0438 03/31/25 0409             Is the patient's history suggestive of a new or worsening infection? Yes (Proceed)  -JF No  -DW       Suspected source of infection pneumonia;empyema  -JF --       Indicate SIRS criteria Tachycardia > 90 bpm;Tachypnea > 20 resp per min;Leukocytosis (WBC > 11003 IJL) OR Leukopenia (WBC <4000 IJL) OR Bandemia (WBC >10% bands)  -JF --       Are two or more of the above signs & symptoms of infection both present and new to the patient? Yes (Proceed)  -JF --       Assess for evidence of organ dysfunction: Are any of the below criteria present within 6 hours of suspected infection and SIRS criteria that are NOT considered to be chronic conditions? SBP < 90;Lactate > 2.0;New need for invasive/non-invasive ventilation  -JF --       Date of presentation of severe sepsis -- --       Time of presentation of severe sepsis -- --       Date of presentation of septic shock -- --       Time of presentation of septic shock -- --       Fluid Resuscitation: 30 ml/kg IV fluid bolus will be given based on actual body weight  -JF --       Is the patient is persistently hypotensive in the hour after fluid bolus administration? If yes, patient meets criteria for vasopressor use. NO  -JF --       Sepsis Note: Click \"NEXT\" below (NOT \"close\") to generate sepsis note based on above information. -- --                 User Key  (r) = Recorded By, (t) = Taken By, (c) = Cosigned By      Initials Name Provider Type    DW JUVE Proctor Nurse Practitioner    SUSAN Mast PA-C Physician Assistant                        Body mass index is 24.1 kg/m².  Wt Readings from Last 1 Encounters:   03/31/25 76.2 kg (167 lb 15.9 oz)        Ideal body weight: 73 kg (160 lb 15 oz)  Adjusted ideal body weight: 74.3 kg (163 lb 12.1 oz)    "

## 2025-03-31 NOTE — ASSESSMENT & PLAN NOTE
No AE of COPD  OP medications: Pulmicort, Spiriva and Albuterol  Will continue Pulmicort, Atrovent and Xopenex at this time  Wean FiO2 for SaO2 >90%

## 2025-03-31 NOTE — ASSESSMENT & PLAN NOTE
Pt with progressive worsening shortness of breath at nursing facility  EMS gave duoneb  In the ED the patient was placed on BIPAP for work of breathing and hypoxemia  CT scan demonstrates debris in the trachea, RML bronchus and RLL bronchus occluded, RUL and RML with dependent consolidation and pleural effusion on the right noted  VBG 7.30/60/30/31    Plan:  Wean BIPAP for SaO2 >92%  Airway clearance protocol  US to evaluate pleural effusion for possible thoracentesis  Patient may benefit from bronch  NPO status  SLP consulted  Continue broad spectrum abx  Cultures pending

## 2025-03-31 NOTE — PLAN OF CARE
Problem: Prexisting or High Potential for Compromised Skin Integrity  Goal: Skin integrity is maintained or improved  Description: INTERVENTIONS:- Identify patients at risk for skin breakdown- Assess and monitor skin integrity- Assess and monitor nutrition and hydration status- Monitor labs - Assess for incontinence - Turn and reposition patient- Assist with mobility/ambulation- Relieve pressure over bony prominences- Avoid friction and shearing- Provide appropriate hygiene as needed including keeping skin clean and dry- Evaluate need for skin moisturizer/barrier cream- Collaborate with interdisciplinary team - Patient/family teaching- Consider wound care consult   Outcome: Progressing     Problem: SAFETY ADULT  Goal: Patient will remain free of falls  Description: INTERVENTIONS:- Educate patient/family on patient safety including physical limitations- Instruct patient to call for assistance with activity - Consult OT/PT to assist with strengthening/mobility - Keep Call bell within reach- Keep bed low and locked with side rails adjusted as appropriate- Keep care items and personal belongings within reach- Initiate and maintain comfort rounds- Make Fall Risk Sign visible to staff- Offer Toileting every  Hours, in advance of need- Initiate/Maintain alarm- Obtain necessary fall risk management equipment: - Apply yellow socks and bracelet for high fall risk patients- Consider moving patient to room near nurses station  INTERVENTIONS:- Educate patient/family on patient safety including physical limitations- Instruct patient to call for assistance with activity - Consult OT/PT to assist with strengthening/mobility - Keep Call bell within reach- Keep bed low and locked with side rails adjusted as appropriate- Keep care items and personal belongings within reach- Initiate and maintain comfort rounds- Make Fall Risk Sign visible to staff- Offer Toileting every  Hours, in advance of need- Initiate/Maintain alarm- Obtain  necessary fall risk management equipment: - Apply yellow socks and bracelet for high fall risk patients- Consider moving patient to room near nurses station  Outcome: Progressing  Goal: Maintain or return to baseline ADL function  Description: INTERVENTIONS:-  Assess patient's ability to carry out ADLs; assess patient's baseline for ADL function and identify physical deficits which impact ability to perform ADLs (bathing, care of mouth/teeth, toileting, grooming, dressing, etc.)- Assess/evaluate cause of self-care deficits - Assess range of motion- Assess patient's mobility; develop plan if impaired- Assess patient's need for assistive devices and provide as appropriate- Encourage maximum independence but intervene and supervise when necessary- Involve family in performance of ADLs- Assess for home care needs following discharge - Consider OT consult to assist with ADL evaluation and planning for discharge- Provide patient education as appropriate  INTERVENTIONS:-  Assess patient's ability to carry out ADLs; assess patient's baseline for ADL function and identify physical deficits which impact ability to perform ADLs (bathing, care of mouth/teeth, toileting, grooming, dressing, etc.)- Assess/evaluate cause of self-care deficits - Assess range of motion- Assess patient's mobility; develop plan if impaired- Assess patient's need for assistive devices and provide as appropriate- Encourage maximum independence but intervene and supervise when necessary- Involve family in performance of ADLs- Assess for home care needs following discharge - Consider OT consult to assist with ADL evaluation and planning for discharge- Provide patient education as appropriate  Outcome: Progressing  Goal: Maintains/Returns to pre admission functional level  Description: INTERVENTIONS:- Perform AM-PAC 6 Click Basic Mobility/ Daily Activity assessment daily.- Set and communicate daily mobility goal to care team and patient/family/caregiver. -  Collaborate with rehabilitation services on mobility goals if consulted- Perform Range of Motion  times a day.- Reposition patient every  hours.- Dangle patient  times a day- Stand patient  times a day- Ambulate patient  times a day- Out of bed to chair  times a day - Out of bed for meals  times a day- Out of bed for toileting- Record patient progress and toleration of activity level   INTERVENTIONS:- Perform AM-PAC 6 Click Basic Mobility/ Daily Activity assessment daily.- Set and communicate daily mobility goal to care team and patient/family/caregiver. - Collaborate with rehabilitation services on mobility goals if consulted- Perform Range of Motion  times a day.- Reposition patient every  hours.- Dangle patient  times a day- Stand patient  times a day- Ambulate patient  times a day- Out of bed to chair  times a day - Out of bed for meals  times a day- Out of bed for toileting- Record patient progress and toleration of activity level   Outcome: Progressing     Problem: DISCHARGE PLANNING  Goal: Discharge to home or other facility with appropriate resources  Description: INTERVENTIONS:- Identify barriers to discharge w/patient and caregiver- Arrange for needed discharge resources and transportation as appropriate- Identify discharge learning needs (meds, wound care, etc.)- Arrange for interpretive services to assist at discharge as needed- Refer to Case Management Department for coordinating discharge planning if the patient needs post-hospital services based on physician/advanced practitioner order or complex needs related to functional status, cognitive ability, or social support system  INTERVENTIONS:- Identify barriers to discharge w/patient and caregiver- Arrange for needed discharge resources and transportation as appropriate- Identify discharge learning needs (meds, wound care, etc.)- Arrange for interpretive services to assist at discharge as needed- Refer to Case Management Department for coordinating  discharge planning if the patient needs post-hospital services based on physician/advanced practitioner order or complex needs related to functional status, cognitive ability, or social support system  Outcome: Progressing     Problem: Knowledge Deficit  Goal: Patient/family/caregiver demonstrates understanding of disease process, treatment plan, medications, and discharge instructions  Description: Complete learning assessment and assess knowledge base.Interventions:- Provide teaching at level of understanding- Provide teaching via preferred learning methods  Complete learning assessment and assess knowledge base.Interventions:- Provide teaching at level of understanding- Provide teaching via preferred learning methods  Outcome: Progressing     Problem: PAIN - ADULT  Goal: Verbalizes/displays adequate comfort level or baseline comfort level  Description: Interventions:- Encourage patient to monitor pain and request assistance- Assess pain using appropriate pain scale- Administer analgesics based on type and severity of pain and evaluate response- Implement non-pharmacological measures as appropriate and evaluate response- Consider cultural and social influences on pain and pain management- Notify physician/advanced practitioner if interventions unsuccessful or patient reports new pain  Outcome: Progressing     Problem: INFECTION - ADULT  Goal: Absence or prevention of progression during hospitalization  Description: INTERVENTIONS:- Assess and monitor for signs and symptoms of infection- Monitor lab/diagnostic results- Monitor all insertion sites, i.e. indwelling lines, tubes, and drains- Monitor endotracheal if appropriate and nasal secretions for changes in amount and color- [place] appropriate cooling/warming therapies per order- Administer medications as ordered- Instruct and encourage patient and family to use good hand hygiene technique- Identify and instruct in appropriate isolation precautions for  identified infection/condition  Outcome: Progressing     Problem: RESPIRATORY - ADULT  Goal: Achieves optimal ventilation and oxygenation  Description: INTERVENTIONS:- Assess for changes in respiratory status- Assess for changes in mentation and behavior- Position to facilitate oxygenation and minimize respiratory effort- Oxygen administered by appropriate delivery if ordered- Initiate smoking cessation education as indicated- Encourage broncho-pulmonary hygiene including cough, deep breathe, Incentive Spirometry- Assess the need for suctioning and aspirate as needed- Assess and instruct to report SOB or any respiratory difficulty- Respiratory Therapy support as indicated  Outcome: Progressing

## 2025-03-31 NOTE — ASSESSMENT & PLAN NOTE
Wt Readings from Last 3 Encounters:   03/31/25 76.2 kg (167 lb 15.9 oz)   02/21/24 77.6 kg (171 lb)   01/03/24 77.6 kg (171 lb)     Last ECHO from 2022: EF 60-65%. Grade 1 DD, RV normal, mild AS  Continue baseline Torsemide 20mg PO daily  Repeat ECHO ordered

## 2025-03-31 NOTE — RESPIRATORY THERAPY NOTE
03/31/25 1158   (S)CMV Settings   Resp Rate (BPM) 16 BPM   VT (mL) 440 mL   FIO2 (%) 100 %   PEEP (cmH2O) 6 cmH2O   I:E Ratio 1:2.0   Insp Time (%) 1 %   Flow Trigger (LPM) 2   Humidification Heater   Heater Temperature (Set) 98.6 °F (37 °C)   Pause Time (%) 0 %   (S)CMV Actuals   Resp Rate (BPM) 21 BPM   VT (mL) 470   MV 9   MAP (cmH2O) 11 cmH2O   Peak Pressure (cmH2O) 18 cmH2O   I:E Ratio (Obs) 1:2.0   Insp Resistance 8   Heater Temperature (Obs) 98.6 °F (37 °C)   Static Compliance (mL/cmH20) 39.5 mL/cmH2O   Plateau Pressure (cm H2O) 20.9 cm H2O   (S)CMV Alarms   High Peak Pressure (cmH2O) 45   Low Pressure (cmH2O) 5 cm H2O   High Resp Rate (BPM) 40 BPM   Low Resp Rate (BPM) 5 BPM   High MV (L/min) 14 L/min   Low MV (L/min) 4 L/min   High VT (mL) 800 mL   Low VT (mL) 250 mL   Leak (%) 0 %   Maintenance   Alarm (pink) cable attached No   Resuscitation bag with peep valve at bedside Yes   Water bag changed No   Circuit changed No   Daily Screen   Patient safety screen outcome: Failed   Not Ready for Weaning due to: Underline problem not resolved   Spont breathing trial outcome:   (not applicable)   IHI Ventilator Associated Pneumonia Bundle   Daily Assessment of Readiness to Extubate Not applicable (Comment)   Head of Bed Elevated HOB 30   Oral Care Suction swab     RT Ventilator Management Note  Den Warren 84 y.o. male MRN: [id number]  Unit/Bed#: ICU 04 Encounter: [id number]      Daily Screen         3/31/2025  1158             Patient safety screen outcome:: Failed (P)     Not Ready for Weaning due to:: Underline problem not resolved (P)     Spont breathing trial outcome:: --              Physical Exam:   Pt bronch today, per AP team, no sbt weaning today, possible sbt tomorrow

## 2025-03-31 NOTE — WOUND OSTOMY CARE
Consult Note - Wound   Den Warren 84 y.o. male MRN: [id number]  Unit/Bed#: ICU 04 Encounter: [id number]      History and Present Illness:  84 year old male presented to the hospital with progressive cough, weakness, and shortness of breath.  Patient's history significant for heart failure, Parkinsons disease.    Assessment Findings:   Patient assessed along with primary RN.  He is on low air-loss mattress with ATR positioning system in place.  On ventilator, sedated, and restrained.  Dependent for all cares and repositioning.  Currently NPO.  Incontinent of bowel and bladder with condom catheter in place for urinary management.  Bilateral heels intact, erythematous, blanchable, and boggy with preventative foam dressings in place--offloading heel boots applied.  Preventative dressings in place to bilateral elbows.  ET tube francisco passes over ear lobe (due to patient's anatomy, no other option) with concern for possible pressure component--optifoam dressing applied between tube francisco and ear lobe for prevention.  Bridge of nose with blanchable erythema and intact epidermis--patient had been on Bipap.  Present on admission stage 1 pressure injury to left medial thigh--non-blanchable erythema in linear pattern with intact epidermis.  No induration or blistering.  Rufina-wound intact.  Present on admission deep tissue pressure injury to right medial thigh--purple/maroon, non-blanchable in linear pattern with intact epidermis.  No induration or blistering.  Rufina-wound intact.  Present on admission stage 3 pressure injury to mid sacrum--beefy red, full thickness tissue loss with small serosanguinous drainage.  Rufina-wound intact with blanchable erythema.    Left head--dry, beefy red with brown scaling.  Suspicious for malignancy.  Open to air.    Present on admission deep tissue pressure injury to right buttock--non-blanchable maroon, intact epidermis.  Rufina-wound intact with blanchable erythema.  No induration.      See flowsheet for wound details.  Deep tissue pressure injuries can be stage 3, 4, or unstageable when fully evolved.    Wound Care Plan:   1-Apply silicone bordered foam dressings to bilateral heels and elbows for prevention.  Kaushik with P.  Peel back for skin assessments at least daily and re-apply.  Change dressings every 3 days and as needed.  2-Elevate heels off of bed/chair surface--offloading heel boots.  3-Offloading air cushion in chair when out of bed, if able.  4-Apply moisturizing skin cream to body daily and as needed.  5-Turn/reposition every 2 hours for pressure re-distribution on skin.   6-Mid sacral/right buttock--cleanse with vashe, pat dry.  Apply calcium alginate (Melgisorb) and cover with silicone bordered foam dressing.  Change dressing every other day and as needed.  7-Bilateral medial thigh--apply silicone cream/Hydraguard lotion twice daily and as needed.  8-Bilateral ears (under ET tube francisco)--apply Optifoam for protectin of ears.  Change dressing every other day and as needed.  9-Left head--leave open to air.    Wound care team to follow.  Plan of care reviewed with primary RN.    Wound 03/31/25 Pressure Injury Thigh Left;Proximal;Medial (Active)   Wound Image   03/31/25 1351   Wound Description Non-blanchable erythema 03/31/25 1351   Pressure Injury Stage 1 03/31/25 1351   Wound Length (cm) 6 cm 03/31/25 1351   Wound Width (cm) 4.5 cm 03/31/25 1351   Wound Depth (cm) 0 cm 03/31/25 1351   Wound Surface Area (cm^2) 27 cm^2 03/31/25 1351   Wound Volume (cm^3) 0 cm^3 03/31/25 1351   Calculated Wound Volume (cm^3) 0 cm^3 03/31/25 1351   Drainage Amount None 03/31/25 1351   Rufina-wound Assessment Intact 03/31/25 1351   Dressing Open to air 03/31/25 1351       Wound 03/31/25 Pressure Injury Thigh Right;Medial;Proximal (Active)   Wound Image   03/31/25 1350   Wound Description Light purple;Non-blanchable erythema 03/31/25 1350   Pressure Injury Stage DTPI 03/31/25 1350   Wound Length (cm) 8.5  cm 03/31/25 1350   Wound Width (cm) 2 cm 03/31/25 1350   Wound Depth (cm) 0 cm 03/31/25 1350   Wound Surface Area (cm^2) 17 cm^2 03/31/25 1350   Wound Volume (cm^3) 0 cm^3 03/31/25 1350   Calculated Wound Volume (cm^3) 0 cm^3 03/31/25 1350   Drainage Amount None 03/31/25 1350   Dressing Open to air 03/31/25 1350       Wound 03/31/25 Pressure Injury Sacrum (Active)   Wound Image   03/31/25 1332   Wound Description Beefy red 03/31/25 1332   Pressure Injury Stage 3 03/31/25 1332   Non-staged Wound Description Full thickness 03/31/25 1332   Wound Length (cm) 2.2 cm 03/31/25 1332   Wound Width (cm) 1.2 cm 03/31/25 1332   Wound Depth (cm) 0.1 cm 03/31/25 1332   Wound Surface Area (cm^2) 2.64 cm^2 03/31/25 1332   Wound Volume (cm^3) 0.264 cm^3 03/31/25 1332   Calculated Wound Volume (cm^3) 0.26 cm^3 03/31/25 1332   Drainage Amount Small 03/31/25 1332   Drainage Description Serosanguineous 03/31/25 1332   Rufina-wound Assessment Erythema 03/31/25 1332   Treatments Cleansed 03/31/25 1332   Dressing Foam, Silicon (eg. Allevyn, etc);Calcium Alginate 03/31/25 1332   Dressing Changed New 03/31/25 1332   Patient Tolerance Tolerated well 03/31/25 1332   Dressing Status Clean;Dry;Intact 03/31/25 1332       Wound 03/31/25 Nose (Active)   Wound Image   03/31/25 1329   Wound Description Intact;Dry 03/31/25 1329   Wound Length (cm) 0 cm 03/31/25 1329   Wound Width (cm) 0 cm 03/31/25 1329   Wound Depth (cm) 0 cm 03/31/25 1329   Wound Surface Area (cm^2) 0 cm^2 03/31/25 1329   Wound Volume (cm^3) 0 cm^3 03/31/25 1329   Calculated Wound Volume (cm^3) 0 cm^3 03/31/25 1329   Dressing Open to air 03/31/25 1329       Wound 03/31/25 Head Left;Upper (Active)   Wound Image   03/31/25 1328   Wound Description Dry;Beefy red 03/31/25 1328   Wound Length (cm) 0 cm 03/31/25 1328   Wound Width (cm) 0 cm 03/31/25 1328   Wound Depth (cm) 0 cm 03/31/25 1328   Wound Surface Area (cm^2) 0 cm^2 03/31/25 1328   Wound Volume (cm^3) 0 cm^3 03/31/25 1328    Calculated Wound Volume (cm^3) 0 cm^3 03/31/25 1328   Drainage Amount None 03/31/25 1328   Dressing Open to air 03/31/25 1328       Wound 03/31/25 Pressure Injury Buttocks Right (Active)   Wound Description Light purple;Non-blanchable erythema 03/31/25 1339   Pressure Injury Stage DTPI 03/31/25 1339   Wound Length (cm) 3 cm 03/31/25 1339   Wound Width (cm) 4 cm 03/31/25 1339   Wound Depth (cm) 0 cm 03/31/25 1339   Wound Surface Area (cm^2) 12 cm^2 03/31/25 1339   Wound Volume (cm^3) 0 cm^3 03/31/25 1339   Calculated Wound Volume (cm^3) 0 cm^3 03/31/25 1339   Drainage Amount None 03/31/25 1339   Rufina-wound Assessment Erythema 03/31/25 1339   Treatments Cleansed 03/31/25 1339   Dressing Calcium Alginate;Foam, Silicon (eg. Allevyn, etc) 03/31/25 1339   Dressing Changed New 03/31/25 1339   Patient Tolerance Tolerated well 03/31/25 1339   Dressing Status Clean;Intact;Dry 03/31/25 1339       Orly Greer RN, BSN, CWON

## 2025-03-31 NOTE — DISCHARGE INSTR - OTHER ORDERS
Wound Care Plan:   1-Mid sacral/right buttock--cleanse with vashe, pat dry.  Apply calcium alginate (Melgisorb) and cover with silicone bordered foam dressing.  Change dressing every other day and as needed.

## 2025-03-31 NOTE — PROGRESS NOTES
Den Warren is a 84 y.o. male who is currently ordered Vancomycin IV with management by the Pharmacy Consult service.  Relevant clinical data and objective / subjective history reviewed.  Vancomycin Assessment:  Indication and Goal AUC/Trough: Pneumonia (goal -600, trough >10)  Clinical Status: New consult  Micro: Pending blood cultures  Renal Function:  SCr: 1.92 mg/dL  CrCl: 29 mL/min  Renal replacement: Not on dialysis  Days of Therapy: 1  Current Dose: 2000 mg IV Load  Vancomycin Plan:  New Dosing: Will pulse dose based on random level  Next Level: Random level 4/1 with AM labs  Renal Function Monitoring: Daily BMP and UOP  Pharmacy will continue to follow closely for s/sx of nephrotoxicity, infusion reactions and appropriateness of therapy.  BMP and CBC will be ordered per protocol. We will continue to follow the patient’s culture results and clinical progress daily.    Angus Christy, Pharmacist

## 2025-03-31 NOTE — OCCUPATIONAL THERAPY NOTE
Occupational Therapy         Patient Name: Den Warren  Today's Date: 3/31/2025    MD's orders received. Currently pt not appropriate for tx intervention 2* medical status. Will continue when appropriate. Marciano Ramos

## 2025-03-31 NOTE — ED PROVIDER NOTES
Time reflects when diagnosis was documented in both MDM as applicable and the Disposition within this note       Time User Action Codes Description Comment    3/31/2025  1:13 AM Senthil Mast [A41.9] Sepsis (HCC)     3/31/2025  1:13 AM Senthil Mast [J18.9] Pneumonia     3/31/2025  1:14 AM Senthil Mast Add [N17.9] MELANY (acute kidney injury) (HCC)     3/31/2025  1:14 AM Senthil Mast [J96.01] Acute hypoxic respiratory failure (HCC)           ED Disposition       ED Disposition   Admit    Condition   Stable    Date/Time   Mon Mar 31, 2025  1:44 AM    Comment   Case was discussed with Arlene Donald and the patient's admission status was agreed to be Admission Status: inpatient status to the service of Dr. Davis .               Assessment & Plan       Medical Decision Making  84-year-old male presenting in respiratory distress.  On exam he is ill-appearing, significantly increased respiratory effort with accessory muscle use.  Able to speak 2-3 words at a time.  Tachypneic with respirations in the 30s to 40s, tachycardic in the 120s to 160s, saturating 97 to 100% on 15 L NRB.  Diminished lung sounds on right side.  Minimal wheezing.  No lower extremity edema.  Initially with blood pressure 100s/60s, at times down to 80s/50s and 70s/50s.    Sepsis workup obtained.  This demonstrates leukocytosis of 47, mild hypercarbia on VBG, MELANY with increased creatinine of about 0.7, elevated procalcitonin of 3.6, elevated lactic acid of 3.3.  Patient meets criteria for severe sepsis.  Portable chest x-ray was obtained showing a large right sided effusion versus infiltrate.  Patient treated for suspected pneumonia with Rocephin and azithromycin.  He is receiving 30 mg/kg IV fluid resuscitation.  He is receiving heart neb and Solu-Medrol for symptomatic care.  He is on BiPAP for his work of breathing.    Discussed on phone with patient's POA, his nephew Michi Warren.  He confirms that patient is DNR in case of cardiac arrest,  however he does mention that he would like patient intubated if it is going to help his overall condition.  Patient will need critical care service.  Discussed with Arlene Donald from critical care who evaluated patient.  Will also add vancomycin as patient resides in a nursing facility and also likely has risk of aspiration pneumonia.  Patient care transferred to critical care service.  CT resulted after disposition and showed evidence of severe emphysema, pneumonia, and right sided pleural effusion.      Critical Care Time Statement: Upon my evaluation, this patient had a high probability of imminent or life-threatening deterioration due to sepsis and acute respiratory failure, which required my direct attention, intervention, and personal management.  I spent a total of 45 minutes directly providing critical care services, including interpretation of complex medical databases, evaluating for the presence of life-threatening injuries or illnesses, management of organ system failure(s) , complex medical decision making (to support/prevent further life-threatening deterioration)., and interpretation of hemodynamic data. This time is exclusive of procedures, teaching, treating other patients, family meetings, and any prior time recorded by providers other than myself.        Amount and/or Complexity of Data Reviewed  Labs: ordered.  Radiology: ordered and independent interpretation performed.    Risk  Prescription drug management.  Decision regarding hospitalization.             Medications   chlorhexidine (PERIDEX) 0.12 % oral rinse 15 mL (has no administration in time range)   heparin (porcine) subcutaneous injection 5,000 Units (has no administration in time range)   albuterol inhalation solution 10 mg ( Nebulization Canceled Entry 3/30/25 3013)   ipratropium (ATROVENT) 0.02 % inhalation solution 1 mg (1 mg Nebulization Given 3/30/25 8206)   sodium chloride 0.9 % inhalation solution 12 mL (12 mL Nebulization Given  3/30/25 2354)   methylPREDNISolone sodium succinate (Solu-MEDROL) injection 125 mg (125 mg Intravenous Given 3/31/25 0002)   ceftriaxone (ROCEPHIN) 2 g/50 mL in dextrose IVPB (0 mg Intravenous Stopped 3/31/25 0045)   sodium chloride 0.9 % bolus 500 mL (0 mL Intravenous Stopped 3/31/25 0115)   LORazepam (ATIVAN) injection 0.5 mg (0.5 mg Intravenous Given 3/31/25 0016)   azithromycin (ZITHROMAX) 500 mg in sodium chloride 0.9 % 250 mL IVPB (0 mg Intravenous Stopped 3/31/25 0153)   lactated ringers bolus 1,000 mL (0 mL Intravenous Stopped 3/31/25 0233)   vancomycin (VANCOCIN) 2,000 mg in sodium chloride 0.9 % 500 mL IVPB (2,000 mg Intravenous New Bag 3/31/25 0155)       ED Risk Strat Scores                            SBIRT 22yo+      Flowsheet Row Most Recent Value   Initial Alcohol Screen: US AUDIT-C     1. How often do you have a drink containing alcohol? 0 Filed at: 03/31/2025 0056   2. How many drinks containing alcohol do you have on a typical day you are drinking?  0 Filed at: 03/31/2025 0056   3a. Male UNDER 65: How often do you have five or more drinks on one occasion? 0 Filed at: 03/31/2025 0056   3b. FEMALE Any Age, or MALE 65+: How often do you have 4 or more drinks on one occassion? 0 Filed at: 03/31/2025 0056   Audit-C Score 0 Filed at: 03/31/2025 0056   CANDACE: How many times in the past year have you...    Used an illegal drug or used a prescription medication for non-medical reasons? Never Filed at: 03/31/2025 0056                            History of Present Illness       Chief Complaint   Patient presents with    Shortness of Breath     Patient brought in by EMS from nursing home d/t SOB. Nursing home gave an albuterol treatment prior to EMS arrival.        Past Medical History:   Diagnosis Date    BPH (benign prostatic hyperplasia)     COPD (chronic obstructive pulmonary disease) (HCC)     Crohn's disease (HCC)     Depression     Restless legs syndrome       Past Surgical History:   Procedure  Laterality Date    RECTAL SURGERY      abscess removed      Family History   Problem Relation Age of Onset    Stomach cancer Mother     Alzheimer's disease Father       Social History     Tobacco Use    Smoking status: Former     Current packs/day: 0.00     Average packs/day: 0.5 packs/day for 50.0 years (25.0 ttl pk-yrs)     Types: Cigarettes     Start date:      Quit date:      Years since quittin.2    Smokeless tobacco: Never   Vaping Use    Vaping status: Never Used   Substance Use Topics    Alcohol use: Not Currently    Drug use: Never      E-Cigarette/Vaping    E-Cigarette Use Never User       E-Cigarette/Vaping Substances      I have reviewed and agree with the history as documented.     84-year-old male with PMH of COPD presents to the ER in respiratory distress.  He comes from a nursing home, staff member states that he has had several days of worsening cough and shortness of breath.  They gave him an albuterol nebulizer treatment and Tylenol prior to EMS arrival.  On arrival in the ER patient unable to give adequate history due to his respiratory distress.  Reportedly arrives saturating 93% on his baseline 3 L nasal cannula, however with significantly increased respiratory effort so RNs placed him on an NRB.        Review of Systems   Unable to perform ROS: Severe respiratory distress   Respiratory:  Positive for shortness of breath.            Objective       ED Triage Vitals   Temperature Pulse Blood Pressure Respirations SpO2 Patient Position - Orthostatic VS   252 25 2342 25 2342 25   99.3 °F (37.4 °C) (!) 129 101/64 (!) 42 93 % Sitting      Temp Source Heart Rate Source BP Location FiO2 (%) Pain Score    25 2354 25 0045 25 -- --    Oral Monitor Left arm        Vitals      Date and Time Temp Pulse SpO2 Resp BP Pain Score FACES Pain Rating User   25 0300 -- 96 94 % 33 119/56 -- -- [facility]   25 0215  -- 98 96 % 41 101/56 -- -- [facility]   03/31/25 0210 -- 100 96 % 34 94/54 -- --    03/31/25 0200 -- 100 98 % 32 85/53 -- --    03/31/25 0145 -- 102 -- 29 83/53 -- --    03/31/25 0115 -- 116 97 % 52 98/55 -- -- [facility]   03/31/25 0100 -- 114 97 % 60 101/55 -- -- [facility]   03/31/25 0045 -- 122 95 % 43 73/43 -- -- [facility]   03/31/25 0044 -- -- 94 % -- 71/28 -- -- [facility]   03/30/25 2356 -- -- 97 % -- -- -- --    03/30/25 2354 99.3 °F (37.4 °C) -- -- -- -- -- -- [facility]   03/30/25 2342 -- 129 93 % 42 101/64 -- -- [facility]            Physical Exam  Vitals and nursing note reviewed.   Constitutional:       General: He is in acute distress.      Appearance: He is well-developed. He is ill-appearing.   HENT:      Head: Normocephalic and atraumatic.   Eyes:      Conjunctiva/sclera: Conjunctivae normal.   Cardiovascular:      Rate and Rhythm: Normal rate and regular rhythm.      Heart sounds: No murmur heard.  Pulmonary:      Effort: Tachypnea, accessory muscle usage and respiratory distress present.      Breath sounds: Decreased air movement present. Examination of the left-middle field reveals decreased breath sounds. Examination of the left-lower field reveals decreased breath sounds. Decreased breath sounds and wheezing (Trace) present.   Abdominal:      Palpations: Abdomen is soft.      Tenderness: There is no abdominal tenderness.   Musculoskeletal:         General: No swelling.      Cervical back: Neck supple.   Skin:     General: Skin is warm and dry.      Capillary Refill: Capillary refill takes less than 2 seconds.   Neurological:      Mental Status: He is alert.   Psychiatric:         Mood and Affect: Mood normal.         Results Reviewed       Procedure Component Value Units Date/Time    MRSA culture [[id number]] Collected: 03/31/25 0415    Lab Status: In process Specimen: Nares from Nose Updated: 03/31/25 0419    Platelet count [[id number]]  (Abnormal) Collected: 03/31/25 0314    Lab Status: Final result Specimen: Blood from Arm, Right  Updated: 03/31/25 0319     Platelets 630 Thousands/uL      MPV 9.8 fL     Legionella antigen, urine [[id number]]     Lab Status: No result Specimen: Urine     Strep Pneumoniae, Urine [[id number]]     Lab Status: No result Specimen: Urine     Lactic acid 2 Hours [[id number]]  (Abnormal) Collected: 03/31/25 0236    Lab Status: Final result Specimen: Blood from Arm, Right Updated: 03/31/25 0300     LACTIC ACID 4.0 mmol/L     Narrative:      Result may be elevated if tourniquet was used during collection.    Manual Differential(PHLEBS Do Not Order) [[id number]]  (Abnormal) Collected: 03/30/25 2346    Lab Status: Final result Specimen: Blood from Arm, Right Updated: 03/31/25 0117     Segmented % 92 %      Bands % 2 %      Lymphocytes % 1 %      Monocytes % 4 %      Eosinophils % 0 %      Basophils % 1 %      Absolute Neutrophils 44.13 Thousand/uL      Absolute Lymphocytes 0.47 Thousand/uL      Absolute Monocytes 1.88 Thousand/uL      Absolute Eosinophils 0.00 Thousand/uL      Absolute Basophils 0.47 Thousand/uL      Total Counted --     RBC Morphology Present     Platelet Estimate Increased     Microcytes Present     Polychromasia Present    FLU/RSV/COVID - if FLU/RSV clinically relevant [[id number]]  (Normal) Collected: 03/31/25 0000    Lab Status: Final result Specimen: Nares from Nose Updated: 03/31/25 0055     SARS-CoV-2 Negative     INFLUENZA A PCR Negative     INFLUENZA B PCR Negative     RSV PCR Negative    Narrative:      This test has been performed using the CoV-2/Flu/RSV plus assay on the [employer] GeneXpert platform. This test has been validated by the  and verified by the performing laboratory.     This test is designed to amplify and detect the following: nucleocapsid (N), envelope (E), and RNA-dependent RNA polymerase (RdRP) genes of the SARS-CoV-2 genome; matrix (M), basic polymerase (PB2), and acidic protein (PA) segments of the influenza A genome; matrix (M) and non-structural protein (NS) segments  of the influenza B genome, and the nucleocapsid genes of RSV A and RSV B.     Positive results are indicative of the presence of Flu A, Flu B, RSV, and/or SARS-CoV-2 RNA. Positive results for SARS-CoV-2 or suspected novel influenza should be reported to state, local, or federal health departments according to local reporting requirements.      All results should be assessed in conjunction with clinical presentation and other laboratory markers for clinical management.     FOR PEDIATRIC PATIENTS - copy/paste COVID Guidelines URL to browser: [email or web address]       Procalcitonin [[id number]]  (Abnormal) Collected: 03/30/25 2346    Lab Status: Final result Specimen: Blood from Arm, Right Updated: 03/31/25 0033     Procalcitonin 3.66 ng/ml     Protime-INR [[id number]]  (Abnormal) Collected: 03/30/25 2346    Lab Status: Final result Specimen: Blood from Arm, Right Updated: 03/31/25 0030     Protime 16.6 seconds      INR 1.33    Narrative:      INR Therapeutic Range    Indication                                             INR Range      Atrial Fibrillation                                               2.0-3.0  Hypercoagulable State                                    2.0.2.3  Left Ventricular Asist Device                            2.0-3.0  Mechanical Heart Valve                                  -    Aortic(with afib, MI, embolism, HF, LA enlargement,    and/or coagulopathy)                                     2.0-3.0 (2.5-3.5)     Mitral                                                             2.5-3.5  Prosthetic/Bioprosthetic Heart Valve               2.0-3.0  Venous thromboembolism (VTE: VT, PE        2.0-3.0    APTT [[id number]]  (Abnormal) Collected: 03/30/25 2346    Lab Status: Final result Specimen: Blood from Arm, Right Updated: 03/31/25 0030     PTT 36 seconds     B-Type Natriuretic Peptide(BNP) [[id number]]  (Abnormal) Collected: 03/30/25 2346    Lab  Status: Final result Specimen: Blood from Arm, Right Updated: 03/31/25 0030      pg/mL     Lactic acid [[id number]]  (Abnormal) Collected: 03/30/25 2346    Lab Status: Final result Specimen: Blood from Arm, Right Updated: 03/31/25 0022     LACTIC ACID 3.3 mmol/L     Narrative:      Result may be elevated if tourniquet was used during collection.    Comprehensive metabolic panel [[id number]]  (Abnormal) Collected: 03/30/25 2346    Lab Status: Final result Specimen: Blood from Arm, Right Updated: 03/31/25 0022     Sodium 132 mmol/L      Potassium 4.3 mmol/L      Chloride 89 mmol/L      CO2 30 mmol/L      ANION GAP 13 mmol/L      BUN 30 mg/dL      Creatinine 1.92 mg/dL      Glucose 172 mg/dL      Calcium 8.9 mg/dL      Corrected Calcium 9.9 mg/dL      AST 46 U/L      ALT 10 U/L      Alkaline Phosphatase 123 U/L      Total Protein 7.7 g/dL      Albumin 2.8 g/dL      Total Bilirubin 0.92 mg/dL      eGFR 31 ml/min/1.73sq m     Narrative:      National Kid[employer] Disease Foundation guidelines for Chronic Kidney Disease (CKD):     Stage 1 with normal or high GFR (GFR > 90 mL/min/1.73 square meters)    Stage 2 Mild CKD (GFR = 60-89 mL/min/1.73 square meters)    Stage 3A Moderate CKD (GFR = 45-59 mL/min/1.73 square meters)    Stage 3B Moderate CKD (GFR = 30-44 mL/min/1.73 square meters)    Stage 4 Severe CKD (GFR = 15-29 mL/min/1.73 square meters)    Stage 5 End Stage CKD (GFR <15 mL/min/1.73 square meters)  Note: GFR calculation is accurate only with a steady state creatinine    CBC and differential [[id number]]  (Abnormal) Collected: 03/30/25 2346    Lab Status: Final result Specimen: Blood from Arm, Right Updated: 03/31/25 0018     WBC 46.95 Thousand/uL      RBC 4.88 Million/uL      Hemoglobin 13.3 g/dL      Hematocrit 42.0 %      MCV 86 fL      MCH 27.3 pg      MCHC 31.7 g/dL      RDW 13.5 %      MPV 9.6 fL      Platelets 661 Thousands/uL     Narrative:      This is an appended report.  These results have been appended to  a previously verified report.    Blood gas, Venous [[id number]]  (Abnormal) Collected: 03/30/25 2346    Lab Status: Final result Specimen: Blood from Arm, Right Updated: 03/31/25 0009     pH, Bennett 7.309     pCO2, Bennett 63.1 mm Hg      pO2, Bennett 30.4 mm Hg      HCO3, Bennett 31.0 mmol/L      Base Excess, Bennett 2.9 mmol/L      O2 Content, Bennett 10.0 ml/dL      O2 HGB, VENOUS 49.6 %     Blood culture #1 [[id number]] Collected: 03/30/25 2346    Lab Status: In process Specimen: Blood from Arm, Right Updated: 03/31/25 0005    Blood culture #2 [[id number]] Collected: 03/31/25 0000    Lab Status: In process Specimen: Blood from Arm, Left Updated: 03/31/25 0005    UA w Reflex to Microscopic w Reflex to Culture [[id number]]     Lab Status: No result Specimen: Urine             CT chest wo contrast   ED Interpretation by Senthil Mast PA-C (03/31 0235)   [facility] Report:    IMPRESSION:   1. Severe emphysema. There is debris/secretions in the trachea. Right bronchus intermedius and right middle and lower lobe bronchi are occluded. There is dependent consolidation in the right upper lobe and complete collapse of the right middle lobe. There is minimal aeration in the non dependent right lower lobe. Suspect underlying pneumonia.   2. There is large right pleural effusion.         XR chest 1 view portable   ED Interpretation by Senthil Mast PA-C (03/31 0119)   ED wet read: large right lower lung field infiltrate +/- effusion.           Procedures    ED Medication and Procedure Management   Prior to Admission Medications   Prescriptions Last Dose Informant Patient Reported? Taking?   ALPRAZolam (XANAX) 0.5 mg tablet   No No   Sig: Take 1 tablet (0.5 mg total) by mouth 2 (two) times a day as needed for anxiety   Misc Natural Products (URINOZINC PO)   Yes No   Sig: Take 2 tablets by mouth in the morning   acetaminophen (TYLENOL) 325 mg tablet   No No   Sig: Take 2 tablets (650 mg total) by mouth every 6 (six) hours as needed for mild pain    albuterol (PROVENTIL HFA,VENTOLIN HFA) 90 mcg/act inhaler   No No   Sig: INHALE 2 PUFFS EVERY 4 (FOUR) HOURS AS NEEDED FOR WHEEZING OR SHORTNESS OF BREATH   benzonatate (TESSALON) 200 MG capsule   Yes No   Si CAP BY MOUTH THREE TIMES DAILY AS NEEDED   bismuth subsalicylate (PEPTO BISMOL) 524 mg/30 mL oral suspension   No No   Sig: Take 15 mL (262 mg total) by mouth every 6 (six) hours as needed for indigestion Patient may self medicate and keep medication in his room.   budesonide (Pulmicort) 0.25 mg/2 mL nebulizer solution   No No   Sig: Take 2 mL (0.25 mg total) by nebulization 2 (two) times a day Rinse mouth after use.   carbidopa-levodopa (Sinemet)  mg per tablet   No No   Sig: Take 1 tablet by mouth 3 (three) times a day   ciprofloxacin (CILOXAN) 0.3 % ophthalmic solution   No No   Sig: Administer 1 drop to both eyes every 2 (two) hours while awake   ergocalciferol (VITAMIN D2) 50,000 units   Yes No   Sig: Take 50,000 Units by mouth once a week   fluticasone (FLONASE) 50 mcg/act nasal spray   Yes No   Si spray into each nostril daily   formoterol (PERFOROMIST) 20 MCG/2ML nebulizer solution   Yes No   Sig: Take 20 mcg by nebulization every 12 (twelve) hours   guaiFENesin (Mucinex) 600 mg 12 hr tablet   No No   Sig: Take 2 tablets (1,200 mg total) by mouth every 12 (twelve) hours   guaiFENesin (ROBITUSSIN) 100 MG/5ML oral liquid   No No   Sig: Take 10 mL (200 mg total) by mouth 3 (three) times a day as needed for cough   hydrocortisone (ANUSOL-HC) 2.5 % rectal cream   No No   Sig: Apply 1 application topically as needed for hemorrhoids   menthol-zinc oxide (CALMOSPETINE) 0.44-20.625 %   No No   Sig: Apply topically 2 (two) times a day   naproxen sodium (ALEVE) 220 MG tablet   No No   Sig: Take 1 tablet (220 mg total) by mouth 2 (two) times a day with meals   nystatin (MYCOSTATIN) cream   No No   Sig: Apply topically 2 (two) times a day   omeprazole (PriLOSEC) 20 mg delayed release capsule   Yes No    Sig: Take 20 mg by mouth daily   polyethylene glycol (GLYCOLAX) 17 GM/SCOOP powder   Yes No   Sig: Take 17 g by mouth daily as needed   potassium chloride (Klor-Con) 10 mEq tablet   Yes No   Sig: Take 10 mEq by mouth in the morning   senna-docusate sodium (SENOKOT S) 8.6-50 mg per tablet   Yes No   Si TAB BY MOUTH TWICE DAILY   tiotropium (SPIRIVA RESPIMAT) 2.5 MCG/ACT AERS inhaler   Yes No   Sig: Inhale 2 puffs daily   torsemide (DEMADEX) 20 mg tablet   No No   Sig: Take 1 tablet (20 mg total) by mouth daily      Facility-Administered Medications: None     Current Discharge Medication List        CONTINUE these medications which have NOT CHANGED    Details   acetaminophen (TYLENOL) 325 mg tablet Take 2 tablets (650 mg total) by mouth every 6 (six) hours as needed for mild pain  Qty: 80 tablet, Refills: 0    Associated Diagnoses: Toothache      albuterol (PROVENTIL HFA,VENTOLIN HFA) 90 mcg/act inhaler INHALE 2 PUFFS EVERY 4 (FOUR) HOURS AS NEEDED FOR WHEEZING OR SHORTNESS OF BREATH  Qty: 8.5 g, Refills: 10    Comments: Substitution to a formulary equivalent within the same pharmaceutical class is authorized.  Associated Diagnoses: Panlobular emphysema (HCC)      ALPRAZolam (XANAX) 0.5 mg tablet Take 1 tablet (0.5 mg total) by mouth 2 (two) times a day as needed for anxiety  Qty: 60 tablet, Refills: 1    Associated Diagnoses: Anxiety      benzonatate (TESSALON) 200 MG capsule 1 CAP BY MOUTH THREE TIMES DAILY AS NEEDED      bismuth subsalicylate (PEPTO BISMOL) 524 mg/30 mL oral suspension Take 15 mL (262 mg total) by mouth every 6 (six) hours as needed for indigestion Patient may self medicate and keep medication in his room.  Qty: 360 mL, Refills: 0    Associated Diagnoses: Anxiety; Dyspepsia      budesonide (Pulmicort) 0.25 mg/2 mL nebulizer solution Take 2 mL (0.25 mg total) by nebulization 2 (two) times a day Rinse mouth after use.  Qty: 120 mL, Refills: 2    Associated Diagnoses: Centrilobular emphysema  (HCC); Panlobular emphysema (HCC)      carbidopa-levodopa (Sinemet)  mg per tablet Take 1 tablet by mouth 3 (three) times a day  Qty: 90 tablet, Refills: 3    Comments: NEW dose to start when current supply exhausted  Associated Diagnoses: 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HCC)      ciprofloxacin (CILOXAN) 0.3 % ophthalmic solution Administer 1 drop to both eyes every 2 (two) hours while awake  Qty: 10 mL, Refills: 0    Associated Diagnoses: Acute conjunctivitis of both eyes, unspecified acute conjunctivitis type      ergocalciferol (VITAMIN D2) 50,000 units Take 50,000 Units by mouth once a week      fluticasone (FLONASE) 50 mcg/act nasal spray 1 spray into each nostril daily      formoterol (PERFOROMIST) 20 MCG/2ML nebulizer solution Take 20 mcg by nebulization every 12 (twelve) hours      guaiFENesin (Mucinex) 600 mg 12 hr tablet Take 2 tablets (1,200 mg total) by mouth every 12 (twelve) hours  Qty: 40 tablet, Refills: 1    Associated Diagnoses: URI with cough and congestion      guaiFENesin (ROBITUSSIN) 100 MG/5ML oral liquid Take 10 mL (200 mg total) by mouth 3 (three) times a day as needed for cough  Qty: 236 mL, Refills: 3    Associated Diagnoses: Acute cough      hydrocortisone (ANUSOL-HC) 2.5 % rectal cream Apply 1 application topically as needed for hemorrhoids  Qty: 28 g, Refills: 5    Associated Diagnoses: Pruritus, perianal      menthol-zinc oxide (CALMOSPETINE) 0.44-20.625 % Apply topically 2 (two) times a day  Qty: 113 g, Refills: 2    Associated Diagnoses: Dry skin dermatitis      Misc Natural Products (URINOZINC PO) Take 2 tablets by mouth in the morning      naproxen sodium (ALEVE) 220 MG tablet Take 1 tablet (220 mg total) by mouth 2 (two) times a day with meals  Qty: 60 tablet, Refills: 5    Associated Diagnoses: Toothache      nystatin (MYCOSTATIN) cream Apply topically 2 (two) times a day  Qty: 30 g, Refills: 1    Associated Diagnoses: Candidal intertrigo      omeprazole (PriLOSEC) 20 mg delayed  release capsule Take 20 mg by mouth daily      polyethylene glycol (GLYCOLAX) 17 GM/SCOOP powder Take 17 g by mouth daily as needed      potassium chloride (Klor-Con) 10 mEq tablet Take 10 mEq by mouth in the morning      senna-docusate sodium (SENOKOT S) 8.6-50 mg per tablet 1 TAB BY MOUTH TWICE DAILY      tiotropium (SPIRIVA RESPIMAT) 2.5 MCG/ACT AERS inhaler Inhale 2 puffs daily      torsemide (DEMADEX) 20 mg tablet Take 1 tablet (20 mg total) by mouth daily  Qty: 30 tablet, Refills: 5    Associated Diagnoses: Venous insufficiency (chronic) (peripheral)           No discharge procedures on file.  ED SEPSIS DOCUMENTATION   Time reflects when diagnosis was documented in both MDM as applicable and the Disposition within this note       Time User Action Codes Description Comment    3/31/2025  1:13 AM Senthil Mast Add [A41.9] Sepsis (HCC)     3/31/2025  1:13 AM Senthil Mast Add [J18.9] Pneumonia     3/31/2025  1:14 AM Senthil Mast Add [N17.9] MELANY (acute kidney injury) (HCC)     3/31/2025  1:14 AM Senthil Mast Add [J96.01] Acute hypoxic respiratory failure (HCC)            Initial Sepsis Screening       Row Name 03/31/25 0438 03/31/25 0409             Is the patient's history suggestive of a new or worsening infection? Yes (Proceed)  -JF No  -DW       Suspected source of infection pneumonia;empyema  -JF --       Indicate SIRS criteria Tachycardia > 90 bpm;Tachypnea > 20 resp per min;Leukocytosis (WBC > 87118 IJL) OR Leukopenia (WBC <4000 IJL) OR Bandemia (WBC >10% bands)  -JF --       Are two or more of the above signs & symptoms of infection both present and new to the patient? Yes (Proceed)  -JF --       Assess for evidence of organ dysfunction: Are any of the below criteria present within 6 hours of suspected infection and SIRS criteria that are NOT considered to be chronic conditions? SBP < 90;Lactate > 2.0;New need for invasive/non-invasive ventilation  -JF --       Date of presentation of severe sepsis -- --     "   Time of presentation of severe sepsis -- --       Date of presentation of septic shock -- --       Time of presentation of septic shock -- --       Fluid Resuscitation: 30 ml/kg IV fluid bolus will be given based on actual body weight  -JF --       Is the patient is persistently hypotensive in the hour after fluid bolus administration? If yes, patient meets criteria for vasopressor use. NO  -JF --       Sepsis Note: Click \"NEXT\" below (NOT \"close\") to generate sepsis note based on above information. -- --                 User Key  (r) = Recorded By, (t) = Taken By, (c) = Cosigned By      Initials Name Provider Type    JUVE Jefferson Nurse Practitioner    SUSAN Mast PA-C Physician Assistant                       Senthil Mast PA-C  03/31/25 9952    "

## 2025-03-31 NOTE — PROCEDURES
Intubation    Date/Time: 3/31/2025 10:53 AM    Performed by: Mariam Kennedy MD  Authorized by: Mariam Kennedy MD    Patient location:  Bedside  Other Assisting Provider: Yes (comment) (Dr. Mendez)    Consent:     Consent obtained:  Verbal    Consent given by:  Patient    Risks discussed:  Aspiration, brain injury, dental trauma, laryngeal injury, bleeding, death, hypoxia and pneumothorax    Alternatives discussed:  No treatment  Universal protocol:     Procedure explained and questions answered to patient or proxy's satisfaction: yes      Relevant documents present and verified: yes      Test results available and properly labeled: yes      Radiology Images displayed and confirmed.  If images not available, report reviewed: yes      Required blood products, implants, devices, and special equipment available: yes      Site/side marked: yes      Immediately prior to procedure, a time out was called: yes      Patient identity confirmed:  Verbally with patient and arm band  Pre-procedure details:     Patient status:  Awake    Pretreatment medications:  Etomidate (Succinylcholine)    Paralytics:  Succinylcholine  Indications:     Indications for intubation: respiratory distress, respiratory failure and hypoxemia    Procedure details:     Preoxygenation:  Bag valve mask    CPR in progress: no      Intubation method:  Oral    Oral intubation technique:  Glidescope    Nasal intubation technique:  Guided    Tube size (mm):  8.0    Tube type:  Cuffed    Number of attempts:  1    Ventilation between attempts: no    Placement assessment:     ETT to lip:  28    Tube secured with:  ETT francisco    Breath sounds:  Equal    Placement verification: CXR verification      CXR findings:  ETT in proper place  Post-procedure details:     Patient tolerance of procedure:  Tolerated well, no immediate complications

## 2025-03-31 NOTE — SEPSIS NOTE
Sepsis Note   Den Warren 84 y.o. male MRN: [id number]  Unit/Bed#: ICU 04 Encounter: [id number]       Initial Sepsis Screening       Row Name 03/31/25 0409                Is the patient's history suggestive of a new or worsening infection? No  -DW                  User Key  (r) = Recorded By, (t) = Taken By, (c) = Cosigned By      Initials Name Provider Type    DW JUVE Proctor Nurse Practitioner                        Body mass index is 24.1 kg/m².  Wt Readings from Last 1 Encounters:   03/31/25 76.2 kg (167 lb 15.9 oz)        Ideal body weight: 73 kg (160 lb 15 oz)  Adjusted ideal body weight: 74.3 kg (163 lb 12.1 oz)

## 2025-03-31 NOTE — ED PROVIDER NOTES
I supervised the Advanced Practitioner.? I performed, in its entirety, the assessment and plan component of the visit.  I agree with the Advanced Practitioner's note with the following assessment and plan:      Patient is an 84-year-old gentleman with a history of COPD coming in today for worsening shortness of breath.  Upon arrival, please refer to primary provider's note.  Patient is being placed on BiPAP and chest x-ray completed.  Upon my examination patient is on BiPAP and in respiratory distress.  Vital signs will be reviewed.  Patient she is keeps stating that he cannot breathe.  He denies any pain at this time.    Patient is sitting in bed.  Noted respiratory distress with patient on BiPAP.  There is retractions noted.  EOMI.  PERRLA.  Patient maintaining airway.  He has decreased breath sounds on the right compared to the left.  He has mild diffuse wheezing.  He is tachycardic.  He is also tachypneic.  He has no lower extremity edema on my exam.  He is alert to person place and time.  Cranial nerves II through XII grossly intact.  He has some functional active range of motion that he moves bilateral upper and lower extremities independently.      Spoke with Carol, patient's RN from [facility] .  She states that for the past week patient has been progressively worsening with shortness of breath and cough with productive end.  He has been given Tessalon Perles with noted relief until yesterday into today.  When she came back on shift she noticed that patient was having worsening trouble breathing.  DuoNeb was given.  EMS was called after patient was becoming more hypoxic and respiratory distress.    Reviewed paperwork with staff as we do not have a medication list however she reports that he is not on any diuretics. Last weight 175.4# on 3/4/25    Upon arrival multifactorial component was initially thought causing patient's symptoms.  Concern as there is documentation of patient having CHF, COPD as  well as possible lung nodule/mass.  Patient's last EF on echo at [facility] on January 30, 2022 did show EF of 6065% with mild diastolic dysfunction with normal systolic function.  There is mild concentric hypertrophy.      1:04 AM  Patient does meet severe sepsis.  Based on history and physical and further interrogation into patient's chart more concerning for infectious etiology.  IV fluids are going to be resuscitated at this time as patient is becoming more hypotensive.  Patient was stating that he has had having trouble breathing and respiratory call for alterations in his BiPAP if possible.  ICU made aware.    1:48 AM    Patient being admitted to ICU.  Patient was able to have a CAT scan completed        Tania Mcgee,  03/31/25        Tania Mcgee, DO  04/02/25 0672

## 2025-03-31 NOTE — SPEECH THERAPY NOTE
Speech Language/Pathology  Speech/Language Pathology  Assessment    Patient Name: Den Warren  Today's Date: 3/31/2025     Problem List  Principal Problem:    (HFpEF) heart failure with preserved ejection fraction (HCC)  Active Problems:    Pulmonary emphysema (HCC)    Ambulatory dysfunction    Depression    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HCC)    Pleural effusion    Acute hypoxemic and hypercarbic respiratory failure (HCC)    Pneumonia    GERD (gastroesophageal reflux disease)    Lactate blood increase    Past Medical History  Past Medical History:   Diagnosis Date    BPH (benign prostatic hyperplasia)     COPD (chronic obstructive pulmonary disease) (HCC)     Crohn's disease (HCC)     Depression     Restless legs syndrome      Past Surgical History  Past Surgical History:   Procedure Laterality Date    RECTAL SURGERY      abscess removed     3/31/25 Pt was just intubated. Will cancel order. Please reconsult if/when appropriate. 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     H&P/Admit info/ pertinent provider notes: (PMH noted above)  Den Warren is a 84 y.o. who presents from [facility]. He has had progressive cough, weakness and SOB for the past week. In the ED he was placed on BIPAP for acute hypoxemic respiratory failure and work of breathing. He received fluid resuscitation and antibiotics. CT scan showed debris in the trachea, right bronchus intermedius, RML bronchus and RLL bronchus, dependent consolidation in the RUL and RML and a moderate right pleural effusion.     Special Studies:  6/28/24 Routine barium Swallow LVH  Impression  Impression:  Esophageal dysmotility with ineffectual clearing of contrast bolus from the esophagus with multiple secondary and tertiary contractions identified. Consider EGD as clinically indicated.  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       Sodium  135-147mmol/L   132  3/31   Lactic acid  3.5  3/31     Procalcitonin<=0.25ng/ml   14.84  3/31   WBC  4.31-10.16 Thousand/uL   62.12     Nitrites, UA  negative   Leukocytes, UA Moderate 3/31       Previous MBS:  6/27/24 LVH:  DIET Recommendations: Regular, Level 0 Thin Liquids  [x]Meds: crushed/whole in puree   ASSESSMENT: Mild oropharyngeal dysphagia with slow mastication, reduced control with delayed swallow as PO reached valleculae/pyriform sinus prior to swallow. Adequate hyolaryngeal elevation/excursion with epiglottis inverting to protect airway. Some reduced BOT retraction. Mild pharyngeal retention. No penetration or aspiration on study.  7 - Total oral diet with no restriction   Strategies Attempted  *Cued for double swallow to clear pharyngeal retention- effective.   Additional Comments  *Abnormality noted in pharynx/esophagus as seen below. Does not impact flow of bolus. Please correlate via radiologist vs medical team. Sent to MD Shawna and MD Li.    Food Allergies:  nkfa   Current Diet:  NPO   Premorbid diet:  Regular w/ thin (checked records)   O2 requirement:  HFNC  55   Social/Prior living  AL at [facility]   Voice/Speech:    Follows commands:    Cognitive status:

## 2025-03-31 NOTE — ASSESSMENT & PLAN NOTE
Pt with progressive SOB for past week, with cough  Hypoxic and WOB in the ED  CT scan shows RUL and RML consolidations    Plan  Cultures pending  Continue abx  Pulmonary airway clearance protocol

## 2025-03-31 NOTE — PHYSICAL THERAPY NOTE
PHYSICAL THERAPY NOTE          Patient Name: Den Warren  Today's Date: 3/31/2025       03/31/25 0907   PT Last Visit   PT Visit Date 03/31/25   Note Type   Note type Cancelled Session   Cancel Reasons Medical status   Additional Comments PT consult received. Chart reviewed and spoke with nursing.  Not medically appropriate for PT evaluation at this time related to respiratory status.  Will monitor and complete evaluation as appropriate.     Piedad Dos Santos, PT

## 2025-03-31 NOTE — PROCEDURES
Chest Tube Insertion    Date/Time: 3/31/2025 4:18 PM    Performed by: Kevin Wu DO  Authorized by: Kevin Wu DO    Patient location:  Bedside  Other Assisting Provider: Yes (comment)    Consent:     Consent obtained:  Verbal (Patient verbally agreed to chest tube insertion prior to intubation. Discussed with Nephew who also agrees.)    Consent given by:  Patient (with nephew as well.)    Risks discussed:  Bleeding, incomplete drainage, pain, nerve damage, infection and damage to surrounding structures    Alternatives discussed:  No treatment  Universal protocol:     Procedure explained and questions answered to patient or proxy's satisfaction: yes      Relevant documents present and verified: yes      Test results available and properly labeled: yes      Radiology Images displayed and confirmed.  If images not available, report reviewed: yes      Required blood products, implants, devices, and special equipment available: yes      Site/side marked: yes      Immediately prior to procedure a time out was called: yes      Patient identity confirmed:  Arm band, provided demographic data, hospital-assigned identification number, verbally with patient and anonymous protocol, patient vented/unresponsive  Pre-procedure details:     Skin preparation:  ChloraPrep  Indications:     Indications: pleural effusion and other (comment)      Indications comment:  Empyema  Sedation:     Sedation type:  Anxiolysis  Anesthesia (see MAR for exact dosages):     Anesthesia method:  Local infiltration    Local anesthetic:  Lidocaine 1% w/o epi  Procedure details:     Placement location:  Lateral    Laterality:  Right    Approach:  Percutaneous    Thal-Quick Chest Tube Kit:  16 Fr    Ultrasound guidance: yes      Tension pneumothorax: no      Needle Decompression: no      Tube connected to:  Suction    Drainage characteristics:  Cloudy, purulent and serosanguinous    Suture material:  2-0 silk    Dressing:  4x4 sterile  gauze  Post-procedure details:     Post-insertion x-ray findings: tube in good position      Patient tolerance of procedure:  Tolerated well, no immediate complications

## 2025-03-31 NOTE — PLAN OF CARE
Problem: Prexisting or High Potential for Compromised Skin Integrity  Goal: Skin integrity is maintained or improved  Description: INTERVENTIONS:- Identify patients at risk for skin breakdown- Assess and monitor skin integrity- Assess and monitor nutrition and hydration status- Monitor labs - Assess for incontinence - Turn and reposition patient- Assist with mobility/ambulation- Relieve pressure over bony prominences- Avoid friction and shearing- Provide appropriate hygiene as needed including keeping skin clean and dry- Evaluate need for skin moisturizer/barrier cream- Collaborate with interdisciplinary team - Patient/family teaching- Consider wound care consult   Outcome: Progressing     Problem: SAFETY ADULT  Goal: Patient will remain free of falls  Description: INTERVENTIONS:- Educate patient/family on patient safety including physical limitations- Instruct patient to call for assistance with activity - Consult OT/PT to assist with strengthening/mobility - Keep Call bell within reach- Keep bed low and locked with side rails adjusted as appropriate- Keep care items and personal belongings within reach- Initiate and maintain comfort rounds- Make Fall Risk Sign visible to staff- Offer Toileting every  Hours, in advance of need- Initiate/Maintain alarm- Obtain necessary fall risk management equipment: - Apply yellow socks and bracelet for high fall risk patients- Consider moving patient to room near nurses station  Outcome: Progressing  Goal: Maintain or return to baseline ADL function  Description: INTERVENTIONS:-  Assess patient's ability to carry out ADLs; assess patient's baseline for ADL function and identify physical deficits which impact ability to perform ADLs (bathing, care of mouth/teeth, toileting, grooming, dressing, etc.)- Assess/evaluate cause of self-care deficits - Assess range of motion- Assess patient's mobility; develop plan if impaired- Assess patient's need for assistive devices and provide as  appropriate- Encourage maximum independence but intervene and supervise when necessary- Involve family in performance of ADLs- Assess for home care needs following discharge - Consider OT consult to assist with ADL evaluation and planning for discharge- Provide patient education as appropriate  Outcome: Progressing  Goal: Maintains/Returns to pre admission functional level  Description: INTERVENTIONS:- Perform AM-PAC 6 Click Basic Mobility/ Daily Activity assessment daily.- Set and communicate daily mobility goal to care team and patient/family/caregiver. - Collaborate with rehabilitation services on mobility goals if consulted- Perform Range of Motion  times a day.- Reposition patient every  hours.- Dangle patient  times a day- Stand patient  times a day- Ambulate patient  times a day- Out of bed to chair  times a day - Out of bed for meals  times a day- Out of bed for toileting- Record patient progress and toleration of activity level   Outcome: Progressing     Problem: DISCHARGE PLANNING  Goal: Discharge to home or other facility with appropriate resources  Description: INTERVENTIONS:- Identify barriers to discharge w/patient and caregiver- Arrange for needed discharge resources and transportation as appropriate- Identify discharge learning needs (meds, wound care, etc.)- Arrange for interpretive services to assist at discharge as needed- Refer to Case Management Department for coordinating discharge planning if the patient needs post-hospital services based on physician/advanced practitioner order or complex needs related to functional status, cognitive ability, or social support system  Outcome: Progressing     Problem: Knowledge Deficit  Goal: Patient/family/caregiver demonstrates understanding of disease process, treatment plan, medications, and discharge instructions  Description: Complete learning assessment and assess knowledge base.Interventions:- Provide teaching at level of understanding- Provide  teaching via preferred learning methods  Outcome: Progressing

## 2025-03-31 NOTE — H&P
H&P - Critical Care/ICU   Name: Den Warren 84 y.o. male I MRN: [id number]  Unit/Bed#: ICU 04 I Date of Admission: 3/30/2025   Date of Service: 3/31/2025 I Hospital Day: 0       Assessment & Plan  Acute hypoxemic and hypercarbic respiratory failure (HCC)  Pt with progressive worsening shortness of breath at nursing facility  EMS gave duoneb  In the ED the patient was placed on BIPAP for work of breathing and hypoxemia  CT scan demonstrates debris in the trachea, RML bronchus and RLL bronchus occluded, RUL and RML with dependent consolidation and pleural effusion on the right noted  VBG 7.30/60/30/31    Plan:  Wean BIPAP for SaO2 >92%  Airway clearance protocol  US to evaluate pleural effusion for possible thoracentesis  Patient may benefit from bronch  NPO status  SLP consulted  Continue broad spectrum abx  Cultures pending  Pneumonia  Pt with progressive SOB for past week, with cough  Hypoxic and WOB in the ED  CT scan shows RUL and RML consolidations    Plan  Cultures pending  Continue abx  Pulmonary airway clearance protocol  Pleural effusion  Plan for US today to evaluated for possible thoracentesis  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HCC)  Continue home Sinemet  3 times a day  Pulmonary emphysema (HCC)  No AE of COPD  OP medications: Pulmicort, Spiriva and Albuterol  Will continue Pulmicort, Atrovent and Xopenex at this time  Wean FiO2 for SaO2 >90%  Ambulatory dysfunction  Fall precautions  Consult PT and OT  GERD (gastroesophageal reflux disease)  Continue Protonix  Depression  Continue OP Mirtazapine 15 mg daily  (HFpEF) heart failure with preserved ejection fraction (HCC)  Wt Readings from Last 3 Encounters:   03/31/25 76.2 kg (167 lb 15.9 oz)   02/21/24 77.6 kg (171 lb)   01/03/24 77.6 kg (171 lb)     Last ECHO from 2022: EF 60-65%. Grade 1 DD, RV normal, mild AS  Continue baseline Torsemide 20mg PO daily  Repeat ECHO ordered        Lactate blood increase  Continue to tend  Continue with fluid  hydration  Monitor endpoints  Disposition: Stepdown Level 1    History of Present Illness   Den Warren is a 84 y.o. who presents from [facility]. He has had progressive cough, weakness and SOB for the past week. In the ED he was placed on BIPAP for acute hypoxemic respiratory failure and work of breathing. He received fluid resuscitation and antibiotics. CT scan showed debris in the trachea, right bronchus intermedius, RML bronchus and RLL bronchus, dependent consolidation in the RUL and RML and a moderate right pleural effusion.    History obtained from Spoke to nurse at [facility], chart review, and the patient.  Review of Systems: See HPI for Review of Systems    Historical Information   Past Medical History:  No date: BPH (benign prostatic hyperplasia)  No date: COPD (chronic obstructive pulmonary disease) ([facility])  No date: Crohn's disease ([facility])  No date: Depression  No date: Restless legs syndrome Past Surgical History:  No date: RECTAL SURGERY      Comment:  abscess removed   Current Outpatient Medications   Medication Instructions    acetaminophen (TYLENOL) 650 mg, Oral, Every 6 hours PRN    albuterol (PROVENTIL HFA,VENTOLIN HFA) 90 mcg/act inhaler 2 puffs, Inhalation, Every 4 hours PRN    ALPRAZolam (XANAX) 0.5 mg, Oral, 2 times daily PRN    benzonatate (TESSALON) 200 MG capsule 1 CAP BY MOUTH THREE TIMES DAILY AS NEEDED    bismuth subsalicylate (PEPTO BISMOL) 262 mg, Oral, Every 6 hours PRN, Patient may self medicate and keep medication in his room.    budesonide (PULMICORT) 0.25 mg, Nebulization, 2 times daily, Rinse mouth after use.    carbidopa-levodopa (Sinemet)  mg per tablet 1 tablet, Oral, 3 times daily    ciprofloxacin (CILOXAN) 0.3 % ophthalmic solution 1 drop, Both Eyes, Every 2 hours while awake    ergocalciferol (VITAMIN D2) 50,000 Units, Oral, Weekly    fluticasone (FLONASE) 50 mcg/act nasal spray 1 spray, Nasal, Daily    formoterol (PERFOROMIST) 20 mcg,  Nebulization, Every 12 hours    guaiFENesin (MUCINEX) 1,200 mg, Oral, Every 12 hours    guaiFENesin (ROBITUSSIN) 200 mg, Oral, 3 times daily PRN    hydrocortisone (ANUSOL-HC) 2.5 % rectal cream 1 application., Topical, As needed    menthol-zinc oxide (CALMOSPETINE) 0.44-20.625 % Topical, 2 times daily    Misc Natural Products (URINOZINC PO) 2 tablets, Oral, Daily    naproxen sodium (ALEVE) 220 mg, Oral, 2 times daily with meals    nystatin (MYCOSTATIN) cream Topical, 2 times daily    omeprazole (PRILOSEC) 20 mg, Oral, Daily    polyethylene glycol (GLYCOLAX) 17 g, Oral, Daily PRN    potassium chloride (Klor-Con) 10 mEq tablet 10 mEq, Oral, Daily    senna-docusate sodium (SENOKOT S) 8.6-50 mg per tablet 1 TAB BY MOUTH TWICE DAILY    tiotropium (SPIRIVA RESPIMAT) 2.5 MCG/ACT AERS inhaler 2 puffs, Inhalation, Daily    torsemide (DEMADEX) 20 mg, Oral, Daily    No Known Allergies   Social History     Tobacco Use    Smoking status: Former     Current packs/day: 0.00     Average packs/day: 0.5 packs/day for 50.0 years (25.0 ttl pk-yrs)     Types: Cigarettes     Start date:      Quit date:      Years since quittin.2    Smokeless tobacco: Never   Vaping Use    Vaping status: Never Used   Substance Use Topics    Alcohol use: Not Currently    Drug use: Never    Family History   Problem Relation Age of Onset    Stomach cancer Mother     Alzheimer's disease Father           Objective :                   Vitals I/O      Most Recent Min/Max in 24hrs   Temp 99.3 °F (37.4 °C) Temp  Min: 99.3 °F (37.4 °C)  Max: 99.3 °F (37.4 °C)   Pulse 96 Pulse  Min: 96  Max: 129   Resp (!) 33 Resp  Min: 29  Max: 60   /56 BP  Min: 71/28  Max: 119/56   O2 Sat 94 % SpO2  Min: 93 %  Max: 98 %      Intake/Output Summary (Last 24 hours) at 3/31/2025 7907  Last data filed at 3/31/2025 0233  Gross per 24 hour   Intake 1800 ml   Output --   Net 1800 ml       Diet NPO    Invasive Monitoring           Physical Exam   Physical Exam  Eyes:       Pupils: Pupils are equal, round, and reactive to light.   HENT:      Head: Normocephalic and atraumatic.      Mouth/Throat:      Mouth: Mucous membranes are dry.   Cardiovascular:      Rate and Rhythm: Normal rate and regular rhythm.      Heart sounds: No murmur heard.     No friction rub. No gallop.   Musculoskeletal:      Right lower leg: No edema.      Left lower leg: No edema.   Abdominal: General: Abdomen is flat. Bowel sounds are normal.      Palpations: Abdomen is soft.      Tenderness: There is no abdominal tenderness.   Constitutional:       Appearance: He is ill-appearing.   Pulmonary:      Effort: Tachypnea present.      Breath sounds: Examination of the right-middle field reveals rhonchi. Examination of the right-lower field reveals decreased breath sounds. Decreased breath sounds and rhonchi present.   Neurological:      GCS: GCS eye subscore is 4. GCS verbal subscore is 4. GCS motor subscore is 6.      Comments: AO x 1, follow simple commands  Knows he is the hospital for his breathing          Diagnostic Studies        Lab Results: I have reviewed the following results:     Medications:  Scheduled PRN   budesonide, 0.25 mg, BID  carbidopa-levodopa, 1 tablet, TID  cefepime, 1,000 mg, Q12H  chlorhexidine, 15 mL, Q12H LINDA  ergocalciferol, 50,000 Units, Weekly  heparin (porcine), 5,000 Units, Q8H LINDA  ipratropium, 0.5 mg, Q8H  levalbuterol, 1.25 mg, Q8H  mirtazapine, 15 mg, HS  pantoprazole, 40 mg, Early Morning  senna-docusate sodium, 1 tablet, HS  torsemide, 20 mg, Daily      vancomycin, 15 mg/kg, Daily PRN       Continuous          Labs:   CBC    Recent Labs     03/30/25  2346 03/31/25  0314   WBC 46.95*  --    HGB 13.3  --    HCT 42.0  --    * 630*   BANDSPCT 2  --      BMP    Recent Labs     03/30/25 2346   SODIUM 132*   K 4.3   CL 89*   CO2 30   AGAP 13   BUN 30*   CREATININE 1.92*   CALCIUM 8.9       Coags    Recent Labs     03/30/25  2346   INR 1.33*   PTT 36*        Additional  Electrolytes  No recent results       Blood Gas    No recent results  Recent Labs     03/30/25  2346   PHVEN 7.309   [id number] 63.1*   PO2VEN 30.4*   CVG2IQW 31.0*   BEVEN 2.9   Q1EGLKM 49.6*    LFTs  Recent Labs     03/30/25  2346   ALT 10   AST 46*   ALKPHOS 123*   ALB 2.8*   TBILI 0.92       Infectious  Recent Labs     03/30/25  2346   PROCALCITONI 3.66*     Glucose  Recent Labs     03/30/25  2346   GLUC 172*        Administrative Statements

## 2025-04-01 PROBLEM — A41.9 SEPTIC SHOCK (HCC): Status: ACTIVE | Noted: 2025-04-01

## 2025-04-01 PROBLEM — D64.9 ANEMIA: Status: ACTIVE | Noted: 2025-04-01

## 2025-04-01 PROBLEM — N18.2 ACUTE KIDNEY INJURY SUPERIMPOSED ON STAGE 2 CHRONIC KIDNEY DISEASE (HCC): Status: ACTIVE | Noted: 2025-04-01

## 2025-04-01 PROBLEM — N17.9 ACUTE KIDNEY INJURY SUPERIMPOSED ON STAGE 2 CHRONIC KIDNEY DISEASE (HCC): Status: ACTIVE | Noted: 2025-04-01

## 2025-04-01 PROBLEM — R65.21 SEPTIC SHOCK (HCC): Status: ACTIVE | Noted: 2025-04-01

## 2025-04-01 LAB
ALBUMIN SERPL BCG-MCNC: 2.3 G/DL (ref 3.5–5)
ALP SERPL-CCNC: 70 U/L (ref 34–104)
ALT SERPL W P-5'-P-CCNC: 3 U/L (ref 7–52)
ANION GAP SERPL CALCULATED.3IONS-SCNC: 7 MMOL/L (ref 4–13)
AST SERPL W P-5'-P-CCNC: 14 U/L (ref 13–39)
BACTERIA UR CULT: NORMAL
BASOPHILS # BLD AUTO: 0.12 THOUSANDS/ÂΜL (ref 0–0.1)
BASOPHILS NFR BLD AUTO: 0 % (ref 0–1)
BILIRUB SERPL-MCNC: 0.56 MG/DL (ref 0.2–1)
BUN SERPL-MCNC: 44 MG/DL (ref 5–25)
CALCIUM ALBUM COR SERPL-MCNC: 9.7 MG/DL (ref 8.3–10.1)
CALCIUM SERPL-MCNC: 8.3 MG/DL (ref 8.4–10.2)
CHLORIDE SERPL-SCNC: 97 MMOL/L (ref 96–108)
CO2 SERPL-SCNC: 30 MMOL/L (ref 21–32)
CREAT SERPL-MCNC: 1.93 MG/DL (ref 0.6–1.3)
EOSINOPHIL # BLD AUTO: 0 THOUSAND/ÂΜL (ref 0–0.61)
EOSINOPHIL NFR BLD AUTO: 0 % (ref 0–6)
ERYTHROCYTE [DISTWIDTH] IN BLOOD BY AUTOMATED COUNT: 13.8 % (ref 11.6–15.1)
GFR SERPL CREATININE-BSD FRML MDRD: 30 ML/MIN/1.73SQ M
GLUCOSE SERPL-MCNC: 155 MG/DL (ref 65–140)
HCT VFR BLD AUTO: 28 % (ref 36.5–49.3)
HGB BLD-MCNC: 9.2 G/DL (ref 12–17)
IMM GRANULOCYTES # BLD AUTO: >0.5 THOUSAND/UL (ref 0–0.2)
IMM GRANULOCYTES NFR BLD AUTO: 3 % (ref 0–2)
LACTATE SERPL-SCNC: 1.3 MMOL/L (ref 0.5–2)
LYMPHOCYTES # BLD AUTO: 0.5 THOUSANDS/ÂΜL (ref 0.6–4.47)
LYMPHOCYTES NFR BLD AUTO: 1 % (ref 14–44)
MCH RBC QN AUTO: 27.3 PG (ref 26.8–34.3)
MCHC RBC AUTO-ENTMCNC: 32.9 G/DL (ref 31.4–37.4)
MCV RBC AUTO: 83 FL (ref 82–98)
MONOCYTES # BLD AUTO: 2.08 THOUSAND/ÂΜL (ref 0.17–1.22)
MONOCYTES NFR BLD AUTO: 4 % (ref 4–12)
MRSA NOSE QL CULT: ABNORMAL
MRSA NOSE QL CULT: ABNORMAL
NEUTROPHILS # BLD AUTO: 42.58 THOUSANDS/ÂΜL (ref 1.85–7.62)
NEUTS SEG NFR BLD AUTO: 92 % (ref 43–75)
NRBC BLD AUTO-RTO: 0 /100 WBCS
PLATELET # BLD AUTO: 463 THOUSANDS/UL (ref 149–390)
PMV BLD AUTO: 9.6 FL (ref 8.9–12.7)
POTASSIUM SERPL-SCNC: 4.4 MMOL/L (ref 3.5–5.3)
PROCALCITONIN SERPL-MCNC: 17.62 NG/ML
PROT SERPL-MCNC: 5.8 G/DL (ref 6.4–8.4)
RBC # BLD AUTO: 3.37 MILLION/UL (ref 3.88–5.62)
RHODAMINE-AURAMINE STN SPEC: NORMAL
SODIUM SERPL-SCNC: 134 MMOL/L (ref 135–147)
VANCOMYCIN SERPL-MCNC: 12.8 UG/ML (ref 10–20)
WBC # BLD AUTO: 46.84 THOUSAND/UL (ref 4.31–10.16)

## 2025-04-01 PROCEDURE — 80053 COMPREHEN METABOLIC PANEL: CPT

## 2025-04-01 PROCEDURE — 94640 AIRWAY INHALATION TREATMENT: CPT

## 2025-04-01 PROCEDURE — 80202 ASSAY OF VANCOMYCIN: CPT | Performed by: NURSE PRACTITIONER

## 2025-04-01 PROCEDURE — 3E0L317 INTRODUCTION OF OTHER THROMBOLYTIC INTO PLEURAL CAVITY, PERCUTANEOUS APPROACH: ICD-10-PCS | Performed by: EMERGENCY MEDICINE

## 2025-04-01 PROCEDURE — 94760 N-INVAS EAR/PLS OXIMETRY 1: CPT

## 2025-04-01 PROCEDURE — 85027 COMPLETE CBC AUTOMATED: CPT

## 2025-04-01 PROCEDURE — 99291 CRITICAL CARE FIRST HOUR: CPT | Performed by: EMERGENCY MEDICINE

## 2025-04-01 PROCEDURE — 94003 VENT MGMT INPAT SUBQ DAY: CPT

## 2025-04-01 PROCEDURE — 94150 VITAL CAPACITY TEST: CPT

## 2025-04-01 PROCEDURE — 84145 PROCALCITONIN (PCT): CPT

## 2025-04-01 PROCEDURE — 83605 ASSAY OF LACTIC ACID: CPT | Performed by: NURSE PRACTITIONER

## 2025-04-01 RX ORDER — FENTANYL CITRATE 50 UG/ML
50 INJECTION, SOLUTION INTRAMUSCULAR; INTRAVENOUS EVERY 4 HOURS PRN
Refills: 0 | Status: DISCONTINUED | OUTPATIENT
Start: 2025-04-01 | End: 2025-04-01

## 2025-04-01 RX ORDER — FENTANYL CITRATE 50 UG/ML
INJECTION, SOLUTION INTRAMUSCULAR; INTRAVENOUS
Status: COMPLETED
Start: 2025-04-01 | End: 2025-04-01

## 2025-04-01 RX ORDER — ALBUMIN (HUMAN) 12.5 G/50ML
25 SOLUTION INTRAVENOUS ONCE
Status: COMPLETED | OUTPATIENT
Start: 2025-04-01 | End: 2025-04-01

## 2025-04-01 RX ORDER — FENTANYL CITRATE 50 UG/ML
50 INJECTION, SOLUTION INTRAMUSCULAR; INTRAVENOUS EVERY 2 HOUR PRN
Refills: 0 | Status: DISCONTINUED | OUTPATIENT
Start: 2025-04-01 | End: 2025-04-02

## 2025-04-01 RX ADMIN — ESCITALOPRAM OXALATE 5 MG: 10 TABLET ORAL at 08:49

## 2025-04-01 RX ADMIN — DORNASE ALFA 5 MG: 1 SOLUTION RESPIRATORY (INHALATION) at 15:49

## 2025-04-01 RX ADMIN — CARBIDOPA AND LEVODOPA 1 TABLET: 25; 100 TABLET ORAL at 15:41

## 2025-04-01 RX ADMIN — SENNOSIDES AND DOCUSATE SODIUM 1 TABLET: 50; 8.6 TABLET ORAL at 21:25

## 2025-04-01 RX ADMIN — MIRTAZAPINE 15 MG: 15 TABLET, FILM COATED ORAL at 21:02

## 2025-04-01 RX ADMIN — BUDESONIDE 0.25 MG: 0.25 INHALANT RESPIRATORY (INHALATION) at 19:20

## 2025-04-01 RX ADMIN — IPRATROPIUM BROMIDE 0.5 MG: 0.5 SOLUTION RESPIRATORY (INHALATION) at 13:34

## 2025-04-01 RX ADMIN — ALBUMIN (HUMAN) 25 G: 0.25 INJECTION, SOLUTION INTRAVENOUS at 23:41

## 2025-04-01 RX ADMIN — LEVALBUTEROL HYDROCHLORIDE 1.25 MG: 1.25 SOLUTION RESPIRATORY (INHALATION) at 07:31

## 2025-04-01 RX ADMIN — SODIUM CHLORIDE, SODIUM LACTATE, POTASSIUM CHLORIDE, AND CALCIUM CHLORIDE 75 ML/HR: .6; .31; .03; .02 INJECTION, SOLUTION INTRAVENOUS at 11:32

## 2025-04-01 RX ADMIN — VANCOMYCIN HYDROCHLORIDE 1250 MG: 5 INJECTION, POWDER, LYOPHILIZED, FOR SOLUTION INTRAVENOUS at 08:45

## 2025-04-01 RX ADMIN — ROPINIROLE HYDROCHLORIDE 0.5 MG: 0.25 TABLET, FILM COATED ORAL at 21:02

## 2025-04-01 RX ADMIN — FENTANYL CITRATE 50 MCG: 50 INJECTION INTRAMUSCULAR; INTRAVENOUS at 19:57

## 2025-04-01 RX ADMIN — BUDESONIDE 0.25 MG: 0.25 INHALANT RESPIRATORY (INHALATION) at 07:31

## 2025-04-01 RX ADMIN — DORNASE ALFA 5 MG: 1 SOLUTION RESPIRATORY (INHALATION) at 23:29

## 2025-04-01 RX ADMIN — ALTEPLASE 10 MG: 2.2 INJECTION, POWDER, LYOPHILIZED, FOR SOLUTION INTRAVENOUS at 23:29

## 2025-04-01 RX ADMIN — FENTANYL CITRATE 50 MCG: 50 INJECTION INTRAMUSCULAR; INTRAVENOUS at 21:03

## 2025-04-01 RX ADMIN — LEVALBUTEROL HYDROCHLORIDE 1.25 MG: 1.25 SOLUTION RESPIRATORY (INHALATION) at 13:34

## 2025-04-01 RX ADMIN — HEPARIN SODIUM 5000 UNITS: 5000 INJECTION INTRAVENOUS; SUBCUTANEOUS at 15:32

## 2025-04-01 RX ADMIN — CEFEPIME 1000 MG: 1 INJECTION, POWDER, FOR SOLUTION INTRAMUSCULAR; INTRAVENOUS at 18:23

## 2025-04-01 RX ADMIN — CHLORHEXIDINE GLUCONATE 0.12% ORAL RINSE 15 ML: 1.2 LIQUID ORAL at 09:05

## 2025-04-01 RX ADMIN — CHLORHEXIDINE GLUCONATE 0.12% ORAL RINSE 15 ML: 1.2 LIQUID ORAL at 21:24

## 2025-04-01 RX ADMIN — ALTEPLASE 10 MG: 2.2 INJECTION, POWDER, LYOPHILIZED, FOR SOLUTION INTRAVENOUS at 15:49

## 2025-04-01 RX ADMIN — FENTANYL CITRATE 50 MCG: 50 INJECTION INTRAMUSCULAR; INTRAVENOUS at 15:41

## 2025-04-01 RX ADMIN — LEVALBUTEROL HYDROCHLORIDE 1.25 MG: 1.25 SOLUTION RESPIRATORY (INHALATION) at 19:20

## 2025-04-01 RX ADMIN — CARBIDOPA AND LEVODOPA 1 TABLET: 25; 100 TABLET ORAL at 08:49

## 2025-04-01 RX ADMIN — HEPARIN SODIUM 5000 UNITS: 5000 INJECTION INTRAVENOUS; SUBCUTANEOUS at 21:25

## 2025-04-01 RX ADMIN — SODIUM CHLORIDE SOLN NEBU 3% 4 ML: 3 NEBU SOLN at 02:31

## 2025-04-01 RX ADMIN — SODIUM CHLORIDE SOLN NEBU 3% 4 ML: 3 NEBU SOLN at 19:20

## 2025-04-01 RX ADMIN — FENTANYL CITRATE 50 MCG: 50 INJECTION INTRAMUSCULAR; INTRAVENOUS at 23:20

## 2025-04-01 RX ADMIN — IPRATROPIUM BROMIDE 0.5 MG: 0.5 SOLUTION RESPIRATORY (INHALATION) at 19:19

## 2025-04-01 RX ADMIN — SODIUM CHLORIDE SOLN NEBU 3% 4 ML: 3 NEBU SOLN at 07:31

## 2025-04-01 RX ADMIN — IPRATROPIUM BROMIDE 0.5 MG: 0.5 SOLUTION RESPIRATORY (INHALATION) at 07:31

## 2025-04-01 RX ADMIN — HEPARIN SODIUM 5000 UNITS: 5000 INJECTION INTRAVENOUS; SUBCUTANEOUS at 05:22

## 2025-04-01 RX ADMIN — CARBIDOPA AND LEVODOPA 1 TABLET: 25; 100 TABLET ORAL at 21:02

## 2025-04-01 RX ADMIN — PROPOFOL 20 MCG/KG/MIN: 10 INJECTION, EMULSION INTRAVENOUS at 04:45

## 2025-04-01 RX ADMIN — SODIUM CHLORIDE SOLN NEBU 3% 4 ML: 3 NEBU SOLN at 13:34

## 2025-04-01 RX ADMIN — CEFEPIME 1000 MG: 1 INJECTION, POWDER, FOR SOLUTION INTRAMUSCULAR; INTRAVENOUS at 05:22

## 2025-04-01 RX ADMIN — MELATONIN 3 MG: 3 TAB ORAL at 21:25

## 2025-04-01 NOTE — ASSESSMENT & PLAN NOTE
Assessment:  Presented with septic shock secondary to pneumonia given leukocytosis and elevated lactate.  Bandemia.  Elevated procalcitonin.  Remains afebrile  UA with 30-50 WBCs.  Patient had low blood pressure with systolic in the 80s and diastolic in the 50s after his bronchoscopy.  He received albumin and 1 L Isolyte with no response.  He eventually was placed on Levophed drip.     Plan:  Wean off vasopressors as tolerated.  Pending MRSA  Follow-up blood cultures.  Follow-up urine cultures.  Continue antibiotics as below.  Lactic acid resolved.

## 2025-04-01 NOTE — OCCUPATIONAL THERAPY NOTE
Occupational Therapy         Patient Name: Den Warren  Today's Date: 4/1/2025      Pt remains inappropriate for tx intervention 2* medical status. Will continue when appropriate. Marciano Ramos

## 2025-04-01 NOTE — ASSESSMENT & PLAN NOTE
Lab Results   Component Value Date    EGFR 30 04/01/2025    EGFR 28 03/31/2025    EGFR 31 03/30/2025    CREATININE 1.93 (H) 04/01/2025    CREATININE 2.06 (H) 03/31/2025    CREATININE 1.92 (H) 03/30/2025     Baseline creatinine (0.9-1.2)  PTA creatinine 1.92  BUN/creatinine ratio > 20  Likely prerenal/ATN etiology given poor oral intake and septic shock.    Plan:  Continue LR 75 mL/hour  Monitor I's and O's  Daily BMP  Avoid nephrotoxic/hypotensive agents

## 2025-04-01 NOTE — PROGRESS NOTES
Nutrition Plan    Recommend to initiate Jevity 1.2cal at 20 ml/hr, advance 10 ml q8hr or as tolerated to goal rate 65ml/hr, will provide 1872kcal, 87g protein, 1259ml free water;   Free water flushes per md     Nutrition will continue to follow up as per policy    Latesha Sanches MS RD LDN CNSC

## 2025-04-01 NOTE — CASE MANAGEMENT
Case Management Assessment    Patient name Den Warren  Location ICU 04/ICU 04 MRN [id number]  : 1940 Date 2025       Current Admission Date: 3/30/2025  Current Admission Diagnosis:(HFpEF) heart failure with preserved ejection fraction (HCC)   Patient Active Problem List    Diagnosis Date Noted Date Diagnosed    Septic shock (HCC) 2025     Anemia 2025     Acute kidney injury superimposed on stage 2 chronic kidney disease (HCC) 2025     Pleural effusion 2025     Acute hypoxemic and hypercarbic respiratory failure ([facility]) 2025     Pneumonia 2025     GERD (gastroesophageal reflux disease) 2025     (HFpEF) heart failure with preserved ejection fraction ([facility]) 2025     Lactate blood increase 2025     Pulmonary emphysema, unspecified emphysema type ([facility]) 2024     Other constipation 10/25/2023     Nausea 2023     Lung nodule 2023 06/15/2023    Grade I diastolic dysfunction 2023     Venous insufficiency (chronic) (peripheral) 10/19/2022     Peripheral edema 10/14/2022     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[facility]) 2022     Depression 2022     Ambulatory dysfunction 2022     Acute exacerbation of chronic obstructive pulmonary disease (COPD) ([facility]) 2022     Hemorrhoids 2022     BPH (benign prostatic hyperplasia) 2022     Anxiety 2022     Pulmonary emphysema ([facility]) 2022     Anal sphincter incompetence 2022     Former smoker 2020       LOS (days): 1  Geometric Mean LOS (GMLOS) (days): 4.9  Days to GMLOS:3.4     OBJECTIVE:    Risk of Unplanned Readmission Score: 17.65         Current admission status: Inpatient       Preferred Pharmacy:   [facility]  - , [place] - [address]  [address]  [place] [address]  Phone: [phone] Fax: [phone]    Primary Care Provider: No primary care provider on file.    Primary Insurance: MEDICARE  Secondary  Insurance: [email or web address]    ASSESSMENT:  Active Health Care Proxies    There are no active Health Care Proxies on file.       Consult placed for disposition planning. CM reached out to pt's nephew, Michi, via phone to perform opening assessment and go over discharge planning. No response: CM left VM and call back number. CM to close consult. CM to continue to follow.

## 2025-04-01 NOTE — ASSESSMENT & PLAN NOTE
Patient has pneumonia secondary to aspiration with possible loculated parapneumonic effusion.  3/31- chest tube was placed and bronchoscopy for mucous plugging at bedside.  Pleural fluid is positive for lights criteria indicating exudative.  pH 7.4  Negative strep and Legionella.    Plan:  Continue broad-spectrum antibiotics vancomycin and cefepime pending cultures day #2.  Follow-up pleural fluid cultures.  Follow-up bronchial cultures.  Trend fever curve.  CBC daily.

## 2025-04-01 NOTE — PROGRESS NOTES
Progress Note - Critical Care/ICU   Name: Den Warren 84 y.o. male I MRN: [id number]  Unit/Bed#: ICU 04 I Date of Admission: 3/30/2025   Date of Service: 4/1/2025 I Hospital Day: 1      Assessment & Plan  Acute hypoxemic and hypercarbic respiratory failure (HCC)  Assessment:  Patient presented with progressive worsening of shortness of breath for 1 week from [facility].   In the ED, he was requiring BiPAP for increased work of breathing and hypoxemia.  Received ceftriaxone and azithromycin.  CT chest:  Large loculated right pleural effusion with dense consolidations within the RML and RLL.   Occlusion of the right bronchus intermedius.  Given continuous BiPAP requirement patient was admitted to the ICU.  Initial VBG was  7.30/60/30/31  3/31- given increased work of breathing on BiPAP a decision was made to intubate.  Patient was agreeable as well as his POA nephew.    Plan:  Plan SBT once pleural effusion has improved.  Xopenex/Atrovent  Airway clearance protocol.  NPO.  Continue antibiotics as below.  Septic shock (HCC)  Assessment:  Presented with septic shock secondary to pneumonia given leukocytosis and elevated lactate.  Bandemia.  Elevated procalcitonin.  Remains afebrile  UA with 30-50 WBCs.  Patient had low blood pressure with systolic in the 80s and diastolic in the 50s after his bronchoscopy.  He received albumin and 1 L Isolyte with no response.  He eventually was placed on Levophed drip.     Plan:  Wean off vasopressors as tolerated.  Pending MRSA  Follow-up blood cultures.  Follow-up urine cultures.  Continue antibiotics as below.  Lactic acid resolved.  Pneumonia  Patient has pneumonia secondary to aspiration with possible loculated parapneumonic effusion.  3/31- chest tube was placed and bronchoscopy for mucous plugging at bedside.  Pleural fluid is positive for lights criteria indicating exudative.  pH 7.4  Negative strep and Legionella.    Plan:  Continue broad-spectrum antibiotics  vancomycin and cefepime pending cultures day #2.  Follow-up pleural fluid cultures.  Follow-up bronchial cultures.  Trend fever curve.  CBC daily.    Acute kidney injury superimposed on stage 2 chronic kidney disease (HCC)  Lab Results   Component Value Date    EGFR 30 04/01/2025    EGFR 28 03/31/2025    EGFR 31 03/30/2025    CREATININE 1.93 (H) 04/01/2025    CREATININE 2.06 (H) 03/31/2025    CREATININE 1.92 (H) 03/30/2025     Baseline creatinine (0.9-1.2)  PTA creatinine 1.92  BUN/creatinine ratio > 20  Likely prerenal/ATN etiology given poor oral intake and septic shock.    Plan:  Continue LR 75 mL/hour  Monitor I's and O's  Daily BMP  Avoid nephrotoxic/hypotensive agents  (HFpEF) heart failure with preserved ejection fraction (HCC)  Wt Readings from Last 3 Encounters:   03/31/25 75.8 kg (167 lb)   02/21/24 77.6 kg (171 lb)   01/03/24 77.6 kg (171 lb)     Echocardiogram this admission shows EF 73%.  Normal diastolic function.  Mild aortic stenosis.  Home torsemide is on hold given low blood pressure.  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HCC)  Continue home Sinemet  3 times a day through OG tube.   Pulmonary emphysema (HCC)  Last PFT 2020: Moderate obstructive lung disease with hyperinflation and low DLCO.  Home inhalers include Pulmicort, Spiriva, and albuterol.    GERD (gastroesophageal reflux disease)  Continue home Protonix  Depression  Continue OP Mirtazapine 15 mg daily at bedtime.  Continue home Lexapro.  Anemia  Hemoglobin dropping from 13.3 on admission to 9.2  Last hemoglobin in 6/2024 was 10.7.  No signs of bleeding.    Ambulatory dysfunction  Fall precautions  Consult PT and OT  Disposition: Critical care    ICU Core Measures     Vented Patient  VAP Bundle  VAP bundle ordered     A: Assess, Prevent, and Manage Pain Has pain been assessed? Yes  Need for changes to pain regimen? No   B: Both Spontaneous Awakening Trials (SATs) and Spontaneous Breathing Trials (SBTs) Plan to perform spontaneous awakening trial  today? Yes   Plan to perform spontaneous breathing trial today? Yes   Obvious barriers to extubation? No   C: Choice of Sedation RASS Goal: -2 Light Sedation or -1 Drowsy  Need for changes to sedation or analgesia regimen? No   D: Delirium CAM-ICU: Negative   E: Early Mobility  Plan for early mobility? No   F: Family Engagement Plan for family engagement today? Yes       Antibiotic Review: Awaiting culture results.     Review of Invasive Devices:    Enmanuel Plan: Continue for accurate I/O monitoring for 48 hours        Prophylaxis:  VTE VTE covered by:  heparin (porcine), Subcutaneous, 5,000 Units at 04/01/25 0522       Stress Ulcer  covered byomeprazole (PriLOSEC) 20 mg delayed release capsule [[id number]] (Long-Term Med), pantoprazole (PROTONIX) EC tablet 40 mg [[id number]]         24 Hour Events :   Overnight, patient was hypotensive with systolic in the 80s and diastolic in the 40s with maps 60-63.  He was given 1 L Isolyte with no response in blood pressure eventually placed on Levophed.    Subjective   Patient was seen and examined this morning. He was following commands but sedated and intubated.     Objective :                   Vitals I/O      Most Recent Min/Max in 24hrs   Temp 99 °F (37.2 °C) Temp  Min: 97.7 °F (36.5 °C)  Max: 100.4 °F (38 °C)   Pulse 86 Pulse  Min: 85  Max: 145   Resp 20 Resp  Min: 18  Max: 33   /54 BP  Min: 83/49  Max: 141/63   O2 Sat 98 % SpO2  Min: 96 %  Max: 99 %      Intake/Output Summary (Last 24 hours) at 4/1/2025 0730  Last data filed at 4/1/2025 0516  Gross per 24 hour   Intake 1250.27 ml   Output 2070 ml   Net -819.73 ml       Diet NPO    Invasive Monitoring   No invasive Monitoring         Physical Exam   Physical Exam  Skin:     General: Skin is warm.   HENT:      Head: Normocephalic and atraumatic.      Nose: No congestion.      Mouth/Throat:      Mouth: Mucous membranes are moist.   Cardiovascular:      Rate and Rhythm: Normal rate and regular rhythm.      Pulses: Normal  pulses.   Musculoskeletal:      Right lower leg: No edema.      Left lower leg: No edema.   Abdominal:      Palpations: Abdomen is soft.      Tenderness: There is no abdominal tenderness.   Constitutional:       Appearance: He is ill-appearing.      Interventions: He is sedated and intubated.   Pulmonary:      Effort: Pulmonary effort is normal. No accessory muscle usage, respiratory distress or accessory muscle usage. He is intubated.   Neurological:      Mental Status: He is somnolent.   Genitourinary/Anorectal:  Singer present.        Diagnostic Studies        Lab Results: I have reviewed the following results:     Medications:  Scheduled PRN   budesonide, 0.25 mg, BID  carbidopa-levodopa, 1 tablet, TID  cefepime, 1,000 mg, Q12H  chlorhexidine, 15 mL, Q12H LINDA  ergocalciferol, 50,000 Units, Weekly  escitalopram, 5 mg, Daily  heparin (porcine), 5,000 Units, Q8H LINDA  ipratropium, 0.5 mg, TID  levalbuterol, 1.25 mg, TID  mirtazapine, 15 mg, HS  pantoprazole, 40 mg, Early Morning  rOPINIRole, 0.5 mg, HS  senna-docusate sodium, 1 tablet, HS  sodium chloride, 4 mL, Q6H  [Held by provider] torsemide, 20 mg, Daily      vancomycin, 15 mg/kg, Daily PRN       Continuous    lactated ringers, 75 mL/hr, Last Rate: 75 mL/hr (03/31/25 2123)  norepinephrine, 1-30 mcg/min, Last Rate: 3 mcg/min (04/01/25 0708)  propofol, 5-50 mcg/kg/min, Last Rate: 20 mcg/kg/min (04/01/25 0445)         Labs:   CBC    Recent Labs     03/30/25  2346 03/31/25  0314 03/31/25  0754 04/01/25  0503   WBC 46.95*  --  62.12* 46.84*   HGB 13.3  --  11.7* 9.2*   HCT 42.0  --  35.9* 28.0*   *   < > 522* 463*   BANDSPCT 2  --  21*  --     < > = values in this interval not displayed.     BMP    Recent Labs     03/31/25  0754 04/01/25  0503   SODIUM 132* 134*   K 4.5 4.4   CL 95* 97   CO2 27 30   AGAP 10 7   BUN 34* 44*   CREATININE 2.06* 1.93*   CALCIUM 8.3* 8.3*       Coags    Recent Labs     03/30/25  2346   INR 1.33*   PTT 36*        Additional  Electrolytes  No recent results       Blood Gas    No recent results  Recent Labs     03/30/25  2346   PHVEN 7.309   [id number] 63.1*   PO2VEN 30.4*   [id number] 31.0*   BEVEN 2.9   [id number] 49.6*    LFTs  Recent Labs     03/31/25  0754 04/01/25  0503   ALT 21 3*   AST 30 14   ALKPHOS 101 70   ALB 2.4* 2.3*   TBILI 0.62 0.56       Infectious  Recent Labs     03/31/25  0754 04/01/25  0503   PROCALCITONI 14.84* 17.62*     Glucose  Recent Labs     03/30/25  2346 03/31/25  0754 04/01/25  0503   GLUC 172* 203* 155*

## 2025-04-01 NOTE — ASSESSMENT & PLAN NOTE
Wt Readings from Last 3 Encounters:   03/31/25 75.8 kg (167 lb)   02/21/24 77.6 kg (171 lb)   01/03/24 77.6 kg (171 lb)     Echocardiogram this admission shows EF 73%.  Normal diastolic function.  Mild aortic stenosis.  Home torsemide is on hold given low blood pressure.

## 2025-04-01 NOTE — PLAN OF CARE
Problem: Prexisting or High Potential for Compromised Skin Integrity  Goal: Skin integrity is maintained or improved  Description: INTERVENTIONS:- Identify patients at risk for skin breakdown- Assess and monitor skin integrity- Assess and monitor nutrition and hydration status- Monitor labs - Assess for incontinence - Turn and reposition patient- Assist with mobility/ambulation- Relieve pressure over bony prominences- Avoid friction and shearing- Provide appropriate hygiene as needed including keeping skin clean and dry- Evaluate need for skin moisturizer/barrier cream- Collaborate with interdisciplinary team - Patient/family teaching- Consider wound care consult   Outcome: Progressing     Problem: SAFETY ADULT  Goal: Patient will remain free of falls  Description: INTERVENTIONS:- Educate patient/family on patient safety including physical limitations- Instruct patient to call for assistance with activity - Consult OT/PT to assist with strengthening/mobility - Keep Call bell within reach- Keep bed low and locked with side rails adjusted as appropriate- Keep care items and personal belongings within reach- Initiate and maintain comfort rounds- Make Fall Risk Sign visible to staff- Offer Toileting every  Hours, in advance of need- Initiate/Maintain alarm- Obtain necessary fall risk management equipment: - Apply yellow socks and bracelet for high fall risk patients- Consider moving patient to room near nurses station  INTERVENTIONS:- Educate patient/family on patient safety including physical limitations- Instruct patient to call for assistance with activity - Consult OT/PT to assist with strengthening/mobility - Keep Call bell within reach- Keep bed low and locked with side rails adjusted as appropriate- Keep care items and personal belongings within reach- Initiate and maintain comfort rounds- Make Fall Risk Sign visible to staff- Offer Toileting every  Hours, in advance of need- Initiate/Maintain alarm- Obtain  necessary fall risk management equipment: - Apply yellow socks and bracelet for high fall risk patients- Consider moving patient to room near nurses station  Outcome: Progressing  Goal: Maintain or return to baseline ADL function  Description: INTERVENTIONS:-  Assess patient's ability to carry out ADLs; assess patient's baseline for ADL function and identify physical deficits which impact ability to perform ADLs (bathing, care of mouth/teeth, toileting, grooming, dressing, etc.)- Assess/evaluate cause of self-care deficits - Assess range of motion- Assess patient's mobility; develop plan if impaired- Assess patient's need for assistive devices and provide as appropriate- Encourage maximum independence but intervene and supervise when necessary- Involve family in performance of ADLs- Assess for home care needs following discharge - Consider OT consult to assist with ADL evaluation and planning for discharge- Provide patient education as appropriate  INTERVENTIONS:-  Assess patient's ability to carry out ADLs; assess patient's baseline for ADL function and identify physical deficits which impact ability to perform ADLs (bathing, care of mouth/teeth, toileting, grooming, dressing, etc.)- Assess/evaluate cause of self-care deficits - Assess range of motion- Assess patient's mobility; develop plan if impaired- Assess patient's need for assistive devices and provide as appropriate- Encourage maximum independence but intervene and supervise when necessary- Involve family in performance of ADLs- Assess for home care needs following discharge - Consider OT consult to assist with ADL evaluation and planning for discharge- Provide patient education as appropriate  Outcome: Progressing  Goal: Maintains/Returns to pre admission functional level  Description: INTERVENTIONS:- Perform AM-PAC 6 Click Basic Mobility/ Daily Activity assessment daily.- Set and communicate daily mobility goal to care team and patient/family/caregiver. -  Collaborate with rehabilitation services on mobility goals if consulted- Perform Range of Motion  times a day.- Reposition patient every  hours.- Dangle patient  times a day- Stand patient  times a day- Ambulate patient  times a day- Out of bed to chair  times a day - Out of bed for meals  times a day- Out of bed for toileting- Record patient progress and toleration of activity level   INTERVENTIONS:- Perform AM-PAC 6 Click Basic Mobility/ Daily Activity assessment daily.- Set and communicate daily mobility goal to care team and patient/family/caregiver. - Collaborate with rehabilitation services on mobility goals if consulted- Perform Range of Motion  times a day.- Reposition patient every  hours.- Dangle patient  times a day- Stand patient  times a day- Ambulate patient  times a day- Out of bed to chair  times a day - Out of bed for meals  times a day- Out of bed for toileting- Record patient progress and toleration of activity level   Outcome: Progressing     Problem: DISCHARGE PLANNING  Goal: Discharge to home or other facility with appropriate resources  Description: INTERVENTIONS:- Identify barriers to discharge w/patient and caregiver- Arrange for needed discharge resources and transportation as appropriate- Identify discharge learning needs (meds, wound care, etc.)- Arrange for interpretive services to assist at discharge as needed- Refer to Case Management Department for coordinating discharge planning if the patient needs post-hospital services based on physician/advanced practitioner order or complex needs related to functional status, cognitive ability, or social support system  INTERVENTIONS:- Identify barriers to discharge w/patient and caregiver- Arrange for needed discharge resources and transportation as appropriate- Identify discharge learning needs (meds, wound care, etc.)- Arrange for interpretive services to assist at discharge as needed- Refer to Case Management Department for coordinating  discharge planning if the patient needs post-hospital services based on physician/advanced practitioner order or complex needs related to functional status, cognitive ability, or social support system  Outcome: Progressing     Problem: Knowledge Deficit  Goal: Patient/family/caregiver demonstrates understanding of disease process, treatment plan, medications, and discharge instructions  Description: Complete learning assessment and assess knowledge base.Interventions:- Provide teaching at level of understanding- Provide teaching via preferred learning methods  Complete learning assessment and assess knowledge base.Interventions:- Provide teaching at level of understanding- Provide teaching via preferred learning methods  Outcome: Progressing     Problem: PAIN - ADULT  Goal: Verbalizes/displays adequate comfort level or baseline comfort level  Description: Interventions:- Encourage patient to monitor pain and request assistance- Assess pain using appropriate pain scale- Administer analgesics based on type and severity of pain and evaluate response- Implement non-pharmacological measures as appropriate and evaluate response- Consider cultural and social influences on pain and pain management- Notify physician/advanced practitioner if interventions unsuccessful or patient reports new pain  Outcome: Progressing     Problem: INFECTION - ADULT  Goal: Absence or prevention of progression during hospitalization  Description: INTERVENTIONS:- Assess and monitor for signs and symptoms of infection- Monitor lab/diagnostic results- Monitor all insertion sites, i.e. indwelling lines, tubes, and drains- Monitor endotracheal if appropriate and nasal secretions for changes in amount and color- [place] appropriate cooling/warming therapies per order- Administer medications as ordered- Instruct and encourage patient and family to use good hand hygiene technique- Identify and instruct in appropriate isolation precautions for  identified infection/condition  Outcome: Progressing     Problem: RESPIRATORY - ADULT  Goal: Achieves optimal ventilation and oxygenation  Description: INTERVENTIONS:- Assess for changes in respiratory status- Assess for changes in mentation and behavior- Position to facilitate oxygenation and minimize respiratory effort- Oxygen administered by appropriate delivery if ordered- Initiate smoking cessation education as indicated- Encourage broncho-pulmonary hygiene including cough, deep breathe, Incentive Spirometry- Assess the need for suctioning and aspirate as needed- Assess and instruct to report SOB or any respiratory difficulty- Respiratory Therapy support as indicated  Outcome: Progressing     Problem: Nutrition/Hydration-ADULT  Goal: Nutrient/Hydration intake appropriate for improving, restoring or maintaining nutritional needs  Description: Monitor and assess patient's nutrition/hydration status for malnutrition. Collaborate with interdisciplinary team and initiate plan and interventions as ordered.  Monitor patient's weight and dietary intake as ordered or per policy. Utilize nutrition screening tool and intervene as necessary. Determine patient's food preferences and provide high-protein, high-caloric foods as appropriate. INTERVENTIONS:- Monitor oral intake, urinary output, labs, and treatment plans- Assess nutrition and hydration status and recommend course of action- Evaluate amount of meals eaten- Assist patient with eating if necessary - Allow adequate time for meals- Recommend/ encourage appropriate diets, oral nutritional supplements, and vitamin/mineral supplements- Order, calculate, and assess calorie counts as needed- Recommend, monitor, and adjust tube feedings and TPN/PPN based on assessed needs- Assess need for intravenous fluids- Provide specific nutrition/hydration education as appropriate- Include patient/family/caregiver in decisions related to nutrition  Outcome: Progressing     Problem:  COPING  Goal: Pt/Family able to verbalize concerns and demonstrate effective coping strategies  Description: INTERVENTIONS:- Assist patient/family to identify coping skills, available support systems and cultural and spiritual values- Provide emotional support, including active listening and acknowledgement of concerns of patient and caregivers- Reduce environmental stimuli, as able- Provide patient education- Assess for spiritual pain/suffering and initiate spiritual care, including notification of Pastoral Care or [place]andal based community as needed- Assess effectiveness of coping strategies  Outcome: Progressing  Goal: Will report anxiety at manageable levels  Description: INTERVENTIONS:- Administer medication as ordered- Teach and encourage coping skills- Provide emotional support- Assess patient/family for anxiety and ability to cope  Outcome: Progressing     Problem: SAFETY,RESTRAINT: NV/NON-SELF DESTRUCTIVE BEHAVIOR  Goal: Remains free of harm/injury (restraint for non violent/non self-detsructive behavior)  Description: INTERVENTIONS:- Instruct patient/family regarding restraint use - Assess and monitor physiologic and psychological status - Provide interventions and comfort measures to meet assessed patient needs - Identify and implement measures to help patient regain control- Assess readiness for release of restraint   Outcome: Progressing  Goal: Returns to optimal restraint-free functioning  Description: INTERVENTIONS:- Assess the patient's behavior and symptoms that indicate continued need for restraint- Identify and implement measures to help patient regain control- Assess readiness for release of restraint   Outcome: Progressing

## 2025-04-01 NOTE — PROGRESS NOTES
Den Warren is a 84 y.o. male who is currently ordered Vancomycin IV with management by the Pharmacy Consult Service.  Relevant clinical data and objective / subjective history reviewed.  Vancomycin Assessment:  Indication and Goal AUC/Trough:  Pneumonia (goal -600, trough >10)  Clinical Status: stable  Micro:          Renal Function:  SCr: 1.93 mg/dL from 2.06 mg/dL; baseline 1 mg/dL  CrCl: 30 mL/min  UOP: 0.5 mL/kg/hr in the last 24 hours  Renal replacement: Not on dialysis  Days of Therapy: 2  Current Dose:  1,250 mg daily PRN vancomycin level </= 15 mcg/dL, last dose was administered on 3/31 @ 0155  Vancomycin Plan:  New Dosing:  Continue with 1,250 mg daily PRN vancomycin level </= 15 mcg/dL, dose administered this morning  Next Level: Random level with AM labs on 4/2  Renal Function Monitoring: Daily BMP and UOP assessment  Pharmacy will continue to follow closely for s/sx of nephrotoxicity, infusion reactions and appropriateness of therapy.  BMP and CBC will be ordered per protocol. We will continue to follow the patient’s culture results and clinical progress daily.    Nataly Grijalva, SavannahD, BCCCP  Critical Care and Internal Medicine Clinical Pharmacist  [phone] or via Secure Chat

## 2025-04-01 NOTE — ASSESSMENT & PLAN NOTE
Hemoglobin dropping from 13.3 on admission to 9.2  Last hemoglobin in 6/2024 was 10.7.  No signs of bleeding.

## 2025-04-01 NOTE — RESPIRATORY THERAPY NOTE
04/01/25 0948   Daily Screen   Patient safety screen outcome: Failed   Not Ready for Weaning due to: Underline problem not resolved   Spont breathing trial outcome: Failed   RSBI 115   IHI Ventilator Associated Pneumonia Bundle   Daily Awakening Trials Performed Yes   Daily Assessment of Readiness to Extubate Yes   Head of Bed Elevated HOB 30   Oral Care Suction swab     RT Ventilator Management Note  Den Warren 84 y.o. male MRN: [id number]  Unit/Bed#: ICU 04 Encounter: [id number]      Daily Screen         3/31/2025  1158 4/1/2025  0948          Patient safety screen outcome:: Failed Failed (P)       Not Ready for Weaning due to:: Underline problem not resolved Underline problem not resolved (P)       Spont breathing trial outcome:: -- Failed (P)       RSBI: -- 115 (P)                 Physical Exam:    Resp comments:    Pt placed on wean 6/6 RSBI greater than 105;, increased too 10/6 with trc mode, pt became tachypneic,  AP team request pt placed back on (S) CMV

## 2025-04-01 NOTE — ASSESSMENT & PLAN NOTE
Last PFT 2020: Moderate obstructive lung disease with hyperinflation and low DLCO.  Home inhalers include Pulmicort, Spiriva, and albuterol.

## 2025-04-01 NOTE — ASSESSMENT & PLAN NOTE
Assessment:  Patient presented with progressive worsening of shortness of breath for 1 week from [facility].   In the ED, he was requiring BiPAP for increased work of breathing and hypoxemia.  Received ceftriaxone and azithromycin.  CT chest:  Large loculated right pleural effusion with dense consolidations within the RML and RLL.   Occlusion of the right bronchus intermedius.  Given continuous BiPAP requirement patient was admitted to the ICU.  Initial VBG was  7.30/60/30/31  3/31- given increased work of breathing on BiPAP a decision was made to intubate.  Patient was agreeable as well as his POA nephew.    Plan:  Plan SBT once pleural effusion has improved.  Xopenex/Atrovent  Airway clearance protocol.  NPO.  Continue antibiotics as below.

## 2025-04-02 ENCOUNTER — APPOINTMENT (INPATIENT)
Dept: RADIOLOGY | Facility: HOSPITAL | Age: 85
DRG: 870 | End: 2025-04-02
Payer: MEDICARE

## 2025-04-02 LAB
ANION GAP SERPL CALCULATED.3IONS-SCNC: 7 MMOL/L (ref 4–13)
BACTERIA BRONCH AEROBE CULT: ABNORMAL
BASOPHILS # BLD AUTO: 0.05 THOUSANDS/ÂΜL (ref 0–0.1)
BASOPHILS NFR BLD AUTO: 0 % (ref 0–1)
BUN SERPL-MCNC: 52 MG/DL (ref 5–25)
CA-I BLD-SCNC: 1.06 MMOL/L (ref 1.12–1.32)
CALCIUM SERPL-MCNC: 8.3 MG/DL (ref 8.4–10.2)
CHLORIDE SERPL-SCNC: 100 MMOL/L (ref 96–108)
CO2 SERPL-SCNC: 28 MMOL/L (ref 21–32)
CREAT SERPL-MCNC: 1.8 MG/DL (ref 0.6–1.3)
EOSINOPHIL # BLD AUTO: 0 THOUSAND/ÂΜL (ref 0–0.61)
EOSINOPHIL NFR BLD AUTO: 0 % (ref 0–6)
ERYTHROCYTE [DISTWIDTH] IN BLOOD BY AUTOMATED COUNT: 14.1 % (ref 11.6–15.1)
GFR SERPL CREATININE-BSD FRML MDRD: 33 ML/MIN/1.73SQ M
GLUCOSE SERPL-MCNC: 119 MG/DL (ref 65–140)
GRAM STN SPEC: ABNORMAL
GRAM STN SPEC: ABNORMAL
HCT VFR BLD AUTO: 28.8 % (ref 36.5–49.3)
HGB BLD-MCNC: 9.4 G/DL (ref 12–17)
IMM GRANULOCYTES # BLD AUTO: >0.5 THOUSAND/UL (ref 0–0.2)
IMM GRANULOCYTES NFR BLD AUTO: 2 % (ref 0–2)
LYMPHOCYTES # BLD AUTO: 0.51 THOUSANDS/ÂΜL (ref 0.6–4.47)
LYMPHOCYTES NFR BLD AUTO: 2 % (ref 14–44)
MAGNESIUM SERPL-MCNC: 2.2 MG/DL (ref 1.9–2.7)
MCH RBC QN AUTO: 27.2 PG (ref 26.8–34.3)
MCHC RBC AUTO-ENTMCNC: 32.6 G/DL (ref 31.4–37.4)
MCV RBC AUTO: 83 FL (ref 82–98)
MONOCYTES # BLD AUTO: 1.6 THOUSAND/ÂΜL (ref 0.17–1.22)
MONOCYTES NFR BLD AUTO: 5 % (ref 4–12)
NEUTROPHILS # BLD AUTO: 31.82 THOUSANDS/ÂΜL (ref 1.85–7.62)
NEUTS SEG NFR BLD AUTO: 91 % (ref 43–75)
NRBC BLD AUTO-RTO: 0 /100 WBCS
PLATELET # BLD AUTO: 416 THOUSANDS/UL (ref 149–390)
PMV BLD AUTO: 9.6 FL (ref 8.9–12.7)
POTASSIUM SERPL-SCNC: 4.8 MMOL/L (ref 3.5–5.3)
PROCALCITONIN SERPL-MCNC: 12.49 NG/ML
RBC # BLD AUTO: 3.46 MILLION/UL (ref 3.88–5.62)
SODIUM SERPL-SCNC: 135 MMOL/L (ref 135–147)
VANCOMYCIN SERPL-MCNC: 20.5 UG/ML (ref 10–20)
WBC # BLD AUTO: 34.53 THOUSAND/UL (ref 4.31–10.16)

## 2025-04-02 PROCEDURE — 84145 PROCALCITONIN (PCT): CPT

## 2025-04-02 PROCEDURE — 85027 COMPLETE CBC AUTOMATED: CPT

## 2025-04-02 PROCEDURE — 94003 VENT MGMT INPAT SUBQ DAY: CPT

## 2025-04-02 PROCEDURE — 94150 VITAL CAPACITY TEST: CPT

## 2025-04-02 PROCEDURE — 88112 CYTOPATH CELL ENHANCE TECH: CPT | Performed by: STUDENT IN AN ORGANIZED HEALTH CARE EDUCATION/TRAINING PROGRAM

## 2025-04-02 PROCEDURE — 71045 X-RAY EXAM CHEST 1 VIEW: CPT

## 2025-04-02 PROCEDURE — 99291 CRITICAL CARE FIRST HOUR: CPT | Performed by: EMERGENCY MEDICINE

## 2025-04-02 PROCEDURE — 80048 BASIC METABOLIC PNL TOTAL CA: CPT

## 2025-04-02 PROCEDURE — 3E0L317 INTRODUCTION OF OTHER THROMBOLYTIC INTO PLEURAL CAVITY, PERCUTANEOUS APPROACH: ICD-10-PCS | Performed by: EMERGENCY MEDICINE

## 2025-04-02 PROCEDURE — 80202 ASSAY OF VANCOMYCIN: CPT | Performed by: INTERNAL MEDICINE

## 2025-04-02 PROCEDURE — 82330 ASSAY OF CALCIUM: CPT

## 2025-04-02 PROCEDURE — 88305 TISSUE EXAM BY PATHOLOGIST: CPT | Performed by: STUDENT IN AN ORGANIZED HEALTH CARE EDUCATION/TRAINING PROGRAM

## 2025-04-02 PROCEDURE — 94640 AIRWAY INHALATION TREATMENT: CPT

## 2025-04-02 PROCEDURE — 83735 ASSAY OF MAGNESIUM: CPT

## 2025-04-02 PROCEDURE — 94760 N-INVAS EAR/PLS OXIMETRY 1: CPT

## 2025-04-02 RX ORDER — ACETAMINOPHEN 160 MG/5ML
975 SUSPENSION ORAL EVERY 6 HOURS PRN
Status: DISCONTINUED | OUTPATIENT
Start: 2025-04-02 | End: 2025-04-06

## 2025-04-02 RX ORDER — FENTANYL CITRATE 50 UG/ML
50 INJECTION, SOLUTION INTRAMUSCULAR; INTRAVENOUS EVERY 4 HOURS PRN
Refills: 0 | Status: DISCONTINUED | OUTPATIENT
Start: 2025-04-02 | End: 2025-04-07

## 2025-04-02 RX ORDER — CALCIUM GLUCONATE 20 MG/ML
2 INJECTION, SOLUTION INTRAVENOUS ONCE
Status: COMPLETED | OUTPATIENT
Start: 2025-04-02 | End: 2025-04-02

## 2025-04-02 RX ORDER — VANCOMYCIN HYDROCHLORIDE 750 MG/150ML
10 INJECTION, SOLUTION INTRAVENOUS DAILY PRN
Status: DISCONTINUED | OUTPATIENT
Start: 2025-04-03 | End: 2025-04-03

## 2025-04-02 RX ADMIN — CALCIUM GLUCONATE 2 G: 20 INJECTION, SOLUTION INTRAVENOUS at 05:03

## 2025-04-02 RX ADMIN — MIRTAZAPINE 15 MG: 15 TABLET, FILM COATED ORAL at 21:30

## 2025-04-02 RX ADMIN — IPRATROPIUM BROMIDE 0.5 MG: 0.5 SOLUTION RESPIRATORY (INHALATION) at 20:04

## 2025-04-02 RX ADMIN — BUDESONIDE 0.25 MG: 0.25 INHALANT RESPIRATORY (INHALATION) at 07:31

## 2025-04-02 RX ADMIN — IPRATROPIUM BROMIDE 0.5 MG: 0.5 SOLUTION RESPIRATORY (INHALATION) at 07:31

## 2025-04-02 RX ADMIN — CARBIDOPA AND LEVODOPA 1 TABLET: 25; 100 TABLET ORAL at 09:09

## 2025-04-02 RX ADMIN — SODIUM CHLORIDE, SODIUM LACTATE, POTASSIUM CHLORIDE, AND CALCIUM CHLORIDE 75 ML/HR: .6; .31; .03; .02 INJECTION, SOLUTION INTRAVENOUS at 14:31

## 2025-04-02 RX ADMIN — CARBIDOPA AND LEVODOPA 1 TABLET: 25; 100 TABLET ORAL at 21:30

## 2025-04-02 RX ADMIN — LEVALBUTEROL HYDROCHLORIDE 1.25 MG: 1.25 SOLUTION RESPIRATORY (INHALATION) at 20:04

## 2025-04-02 RX ADMIN — ALTEPLASE 10 MG: 2.2 INJECTION, POWDER, LYOPHILIZED, FOR SOLUTION INTRAVENOUS at 23:45

## 2025-04-02 RX ADMIN — BUDESONIDE 0.25 MG: 0.25 INHALANT RESPIRATORY (INHALATION) at 20:04

## 2025-04-02 RX ADMIN — CARBIDOPA AND LEVODOPA 1 TABLET: 25; 100 TABLET ORAL at 16:39

## 2025-04-02 RX ADMIN — LEVALBUTEROL HYDROCHLORIDE 1.25 MG: 1.25 SOLUTION RESPIRATORY (INHALATION) at 14:22

## 2025-04-02 RX ADMIN — HEPARIN SODIUM 5000 UNITS: 5000 INJECTION INTRAVENOUS; SUBCUTANEOUS at 14:02

## 2025-04-02 RX ADMIN — ALTEPLASE 10 MG: 2.2 INJECTION, POWDER, LYOPHILIZED, FOR SOLUTION INTRAVENOUS at 11:48

## 2025-04-02 RX ADMIN — CEFEPIME 1000 MG: 1 INJECTION, POWDER, FOR SOLUTION INTRAMUSCULAR; INTRAVENOUS at 05:03

## 2025-04-02 RX ADMIN — MELATONIN 3 MG: 3 TAB ORAL at 21:30

## 2025-04-02 RX ADMIN — SENNOSIDES AND DOCUSATE SODIUM 1 TABLET: 50; 8.6 TABLET ORAL at 21:30

## 2025-04-02 RX ADMIN — FENTANYL CITRATE 50 MCG: 50 INJECTION INTRAMUSCULAR; INTRAVENOUS at 09:46

## 2025-04-02 RX ADMIN — CEFEPIME 2000 MG: 2 INJECTION, POWDER, FOR SOLUTION INTRAVENOUS at 18:35

## 2025-04-02 RX ADMIN — FENTANYL CITRATE 50 MCG: 50 INJECTION INTRAMUSCULAR; INTRAVENOUS at 04:42

## 2025-04-02 RX ADMIN — LEVALBUTEROL HYDROCHLORIDE 1.25 MG: 1.25 SOLUTION RESPIRATORY (INHALATION) at 07:31

## 2025-04-02 RX ADMIN — CHLORHEXIDINE GLUCONATE 0.12% ORAL RINSE 15 ML: 1.2 LIQUID ORAL at 08:33

## 2025-04-02 RX ADMIN — SODIUM CHLORIDE SOLN NEBU 3% 4 ML: 3 NEBU SOLN at 00:53

## 2025-04-02 RX ADMIN — IPRATROPIUM BROMIDE 0.5 MG: 0.5 SOLUTION RESPIRATORY (INHALATION) at 14:22

## 2025-04-02 RX ADMIN — HEPARIN SODIUM 5000 UNITS: 5000 INJECTION INTRAVENOUS; SUBCUTANEOUS at 21:30

## 2025-04-02 RX ADMIN — Medication 20 MG: at 09:47

## 2025-04-02 RX ADMIN — ESCITALOPRAM OXALATE 5 MG: 10 TABLET ORAL at 08:33

## 2025-04-02 RX ADMIN — DORNASE ALFA 5 MG: 1 SOLUTION RESPIRATORY (INHALATION) at 23:45

## 2025-04-02 RX ADMIN — ROPINIROLE HYDROCHLORIDE 0.5 MG: 0.25 TABLET, FILM COATED ORAL at 21:30

## 2025-04-02 RX ADMIN — ACETAMINOPHEN 975 MG: 650 SUSPENSION ORAL at 16:40

## 2025-04-02 RX ADMIN — HEPARIN SODIUM 5000 UNITS: 5000 INJECTION INTRAVENOUS; SUBCUTANEOUS at 05:04

## 2025-04-02 RX ADMIN — DORNASE ALFA 5 MG: 1 SOLUTION RESPIRATORY (INHALATION) at 11:47

## 2025-04-02 RX ADMIN — CHLORHEXIDINE GLUCONATE 0.12% ORAL RINSE 15 ML: 1.2 LIQUID ORAL at 21:30

## 2025-04-02 NOTE — ASSESSMENT & PLAN NOTE
Assessment:  Presented with septic shock secondary to pneumonia given leukocytosis and elevated lactate.  Bandemia.  Remains afebrile  Negative MRSA.  Negative urine culture.  Negative pleural fluid culture.  Trending down procalcitonin.  Positive bronchial culture also for gram-positive cocci in pairs.  3/31- Patient had low blood pressure with systolic in the 80s and diastolic in the 50s after his bronchoscopy.  He received albumin and 1 L Isolyte with no response.  He eventually was placed on Levophed drip.     Plan:  Patient is off vasopressors.  Follow-up blood cultures.  Continue antibiotics as below.

## 2025-04-02 NOTE — PLAN OF CARE
Problem: Prexisting or High Potential for Compromised Skin Integrity  Goal: Skin integrity is maintained or improved  Description: INTERVENTIONS:- Identify patients at risk for skin breakdown- Assess and monitor skin integrity- Assess and monitor nutrition and hydration status- Monitor labs - Assess for incontinence - Turn and reposition patient- Assist with mobility/ambulation- Relieve pressure over bony prominences- Avoid friction and shearing- Provide appropriate hygiene as needed including keeping skin clean and dry- Evaluate need for skin moisturizer/barrier cream- Collaborate with interdisciplinary team - Patient/family teaching- Consider wound care consult   Outcome: Progressing     Problem: SAFETY ADULT  Goal: Patient will remain free of falls  Description: INTERVENTIONS:- Educate patient/family on patient safety including physical limitations- Instruct patient to call for assistance with activity - Consult OT/PT to assist with strengthening/mobility - Keep Call bell within reach- Keep bed low and locked with side rails adjusted as appropriate- Keep care items and personal belongings within reach- Initiate and maintain comfort rounds- Make Fall Risk Sign visible to staff- Offer Toileting every 2 Hours, in advance of need- Initiate/Maintain bedalarm- Obtain necessary fall risk management equipment: - Apply yellow socks and bracelet for high fall risk patients- Consider moving patient to room near nurses station  Outcome: Progressing  Goal: Maintain or return to baseline ADL function  Description: INTERVENTIONS:-  Assess patient's ability to carry out ADLs; assess patient's baseline for ADL function and identify physical deficits which impact ability to perform ADLs (bathing, care of mouth/teeth, toileting, grooming, dressing, etc.)- Assess/evaluate cause of self-care deficits - Assess range of motion- Assess patient's mobility; develop plan if impaired- Assess patient's need for assistive devices and  provide as appropriate- Encourage maximum independence but intervene and supervise when necessary- Involve family in performance of ADLs- Assess for home care needs following discharge - Consider OT consult to assist with ADL evaluation and planning for discharge- Provide patient education as appropriate  INTERVENTIONS:-  Assess patient's ability to carry out ADLs; assess patient's baseline for ADL function and identify physical deficits which impact ability to perform ADLs (bathing, care of mouth/teeth, toileting, grooming, dressing, etc.)- Assess/evaluate cause of self-care deficits - Assess range of motion- Assess patient's mobility; develop plan if impaired- Assess patient's need for assistive devices and provide as appropriate- Encourage maximum independence but intervene and supervise when necessary- Involve family in performance of ADLs- Assess for home care needs following discharge - Consider OT consult to assist with ADL evaluation and planning for discharge- Provide patient education as appropriate  Outcome: Progressing     Problem: Knowledge Deficit  Goal: Patient/family/caregiver demonstrates understanding of disease process, treatment plan, medications, and discharge instructions  Description: Complete learning assessment and assess knowledge base.Interventions:- Provide teaching at level of understanding- Provide teaching via preferred learning methods  Complete learning assessment and assess knowledge base.Interventions:- Provide teaching at level of understanding- Provide teaching via preferred learning methods  Outcome: Progressing     Problem: PAIN - ADULT  Goal: Verbalizes/displays adequate comfort level or baseline comfort level  Description: Interventions:- Encourage patient to monitor pain and request assistance- Assess pain using appropriate pain scale- Administer analgesics based on type and severity of pain and evaluate response- Implement non-pharmacological measures as appropriate and  evaluate response- Consider cultural and social influences on pain and pain management- Notify physician/advanced practitioner if interventions unsuccessful or patient reports new pain  Outcome: Progressing     Problem: INFECTION - ADULT  Goal: Absence or prevention of progression during hospitalization  Description: INTERVENTIONS:- Assess and monitor for signs and symptoms of infection- Monitor lab/diagnostic results- Monitor all insertion sites, i.e. indwelling lines, tubes, and drains- Monitor endotracheal if appropriate and nasal secretions for changes in amount and color- [place] appropriate cooling/warming therapies per order- Administer medications as ordered- Instruct and encourage patient and family to use good hand hygiene technique- Identify and instruct in appropriate isolation precautions for identified infection/condition  Outcome: Progressing     Problem: RESPIRATORY - ADULT  Goal: Achieves optimal ventilation and oxygenation  Description: INTERVENTIONS:- Assess for changes in respiratory status- Assess for changes in mentation and behavior- Position to facilitate oxygenation and minimize respiratory effort- Oxygen administered by appropriate delivery if ordered- Initiate smoking cessation education as indicated- Encourage broncho-pulmonary hygiene including cough, deep breathe, Incentive Spirometry- Assess the need for suctioning and aspirate as needed- Assess and instruct to report SOB or any respiratory difficulty- Respiratory Therapy support as indicated  Outcome: Progressing     Problem: Nutrition/Hydration-ADULT  Goal: Nutrient/Hydration intake appropriate for improving, restoring or maintaining nutritional needs  Description: Monitor and assess patient's nutrition/hydration status for malnutrition. Collaborate with interdisciplinary team and initiate plan and interventions as ordered.  Monitor patient's weight and dietary intake as ordered or per policy. Utilize nutrition screening tool and  intervene as necessary. Determine patient's food preferences and provide high-protein, high-caloric foods as appropriate. INTERVENTIONS:- Monitor oral intake, urinary output, labs, and treatment plans- Assess nutrition and hydration status and recommend course of action- Evaluate amount of meals eaten- Assist patient with eating if necessary - Allow adequate time for meals- Recommend/ encourage appropriate diets, oral nutritional supplements, and vitamin/mineral supplements- Order, calculate, and assess calorie counts as needed- Recommend, monitor, and adjust tube feedings and TPN/PPN based on assessed needs- Assess need for intravenous fluids- Provide specific nutrition/hydration education as appropriate- Include patient/family/caregiver in decisions related to nutrition  Outcome: Progressing

## 2025-04-02 NOTE — CASE MANAGEMENT
Case Management Assessment & Discharge Planning Note    Patient name Den Warren  Location ICU 04/ICU 04 MRN [id number]  : 1940 Date 2025       Current Admission Date: 3/30/2025  Current Admission Diagnosis:(HFpEF) heart failure with preserved ejection fraction (HCC)   Patient Active Problem List    Diagnosis Date Noted Date Diagnosed    Septic shock (HCC) 2025     Anemia 2025     Acute kidney injury superimposed on stage 2 chronic kidney disease (HCC) 2025     Pleural effusion 2025     Acute hypoxemic and hypercarbic respiratory failure (HCC) 2025     Pneumonia 2025     GERD (gastroesophageal reflux disease) 2025     (HFpEF) heart failure with preserved ejection fraction (HCC) 2025     Lactate blood increase 2025     Pulmonary emphysema, unspecified emphysema type (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) 2022     Depression 2022     Ambulatory dysfunction 2022     Acute exacerbation of chronic obstructive pulmonary disease (COPD) ([facility]) 2022     Hemorrhoids 2022     BPH (benign prostatic hyperplasia) 2022     Anxiety 2022     Pulmonary emphysema ([facility]) 2022     Anal sphincter incompetence 2022     Former smoker 2020       LOS (days): 2  Geometric Mean LOS (GMLOS) (days): 4.9  Days to GMLOS:2.4     OBJECTIVE:    Risk of Unplanned Readmission Score: 18.47         Current admission status: Inpatient       Preferred Pharmacy:   [facility] [place] - [address]  [address]  [place] [address]  Phone: [phone] Fax: [phone]    Primary Care Provider: No primary care provider on file.    Primary  Insurance: MEDICARE  Secondary Insurance: [employer]    ASSESSMENT:  Active Health Care Proxies       Jrgiuseppe Michi Health Care Representative - Nephew   Primary Phone: [phone] (Mobile)  Home Phone: [phone]                 Advance Directives  Does patient have a Health Care POA?: Yes  Primary Contact: raphael Case         Readmission Root Cause  30 Day Readmission: No    Patient Information  Admitted from:: Facility  Mental Status: Sedated, Intubated  During Assessment patient was accompanied by: Not accompanied during assessment  Assessment information provided by:: Other - please comment (facility staff, nephew)  Support Systems: Family members  County of Residence: [place]  What city do you live in?: [place]  Home entry access options. Select all that apply.: No steps to enter home  Type of Current Residence: Facility  Living Arrangements: Lives in Facility (long-term resident, [facility] [place])  Is patient a ?: Yes    Activities of Daily Living Prior to Admission  Functional Status: Total dependent  Completes ADLs independently?: No  Level of ADL dependence: Assistance (can eat, brush teeth independently)  Ambulates independently?: No  Level of ambulatory dependence: Total Dependent (facility uses sit-to-stand machine)  Does patient use assisted devices?: Yes  Assisted Devices (DME) used: Wheelchair         Patient Information Continued  Income Source: Pension/USP  Does patient have prescription coverage?: Yes  Can the patient afford their medications and any related supplies (such as glucometers or test strips)?: Yes  Does patient receive dialysis treatments?: No  Does patient have a history of substance abuse?: No  Does patient have a history of Mental Health Diagnosis?: No         Means of Transportation  Means of Transport to Appts:: Other (Comment) (will need stretcher/BLS)          DISCHARGE DETAILS:    Discharge planning discussed with:: patient's nephew Michi  staff at [facility]  [place] of Choice: Yes  Comments - Freedom of Choice: return to [facility] where patient is a long-term resident/bedhold  CM contacted family/caregiver?: Yes  Were Treatment Team discharge recommendations reviewed with patient/caregiver?: Yes  Did patient/caregiver verbalize understanding of patient care needs?: Yes  Were patient/caregiver advised of the risks associated with not following Treatment Team discharge recommendations?: Yes    Contacts  Relationship to Patient:: Family  Contact Method: Phone  Phone Number: [phone]  Reason/Outcome: Continuity of Care, Emergency Contact, Discharge Planning                        Treatment Team Recommendation: Facility Return  Discharge Destination Plan:: Facility Return  Transport at Discharge : BLS Ambulance                                      Additional Comments: Introduced self and role to patient's nephew Michi via phone, patient remains intubated/sedated.  Also spoke with staff at [facility], where patient is a long-term resident, return referral placed.  Patient is full assist, can eat and do some grooming independently, does not ambulate, facility utilizes a sit-to-stand machine.  Assigned CM will continue to follow.

## 2025-04-02 NOTE — PROGRESS NOTES
Patient:  VEGA LYNN    MRN:  [id number]    Aidin Request ID:  [id number]    Level of care reserved:  Skilled Nursing Facility    Partner Reserved:  [facility], [place] [id number] [phone]    Clinical needs requested:    Geography searched:  10 miles around [id number]    Start of Service:    Request sent:  12:59pm EDT on 4/2/2025 by Nuris Paiz    Partner reserved:  1:13pm EDT on 4/2/2025 by Yoli Boone    Choice list shared:

## 2025-04-02 NOTE — PHYSICAL THERAPY NOTE
Physical Therapy Cancellation Note       04/02/25 0720   PT Last Visit   PT Visit Date 04/02/25   Note Type   Note type Cancelled Session;Evaluation   Cancel Reasons Intubated/sedated   Additional Comments Pt remains sedated & intubated hence will D/C PT at this time. Please reconsult when as medically appropriate.     Denis Ji

## 2025-04-02 NOTE — PROGRESS NOTES
Progress Note - Critical Care/ICU   Name: Den Warren 84 y.o. male I MRN: [id number]  Unit/Bed#: ICU 04 I Date of Admission: 3/30/2025   Date of Service: 4/2/2025 I Hospital Day: 2      Assessment & Plan  Acute hypoxemic and hypercarbic respiratory failure (HCC)  Assessment:  Patient presented with progressive worsening of shortness of breath for 1 week from [facility].   In the ED, he was requiring BiPAP for increased work of breathing and hypoxemia.  Received ceftriaxone and azithromycin.  CT chest:  Large loculated right pleural effusion with dense consolidations within the RML and RLL.   Occlusion of the right bronchus intermedius.  Given continuous BiPAP requirement patient was admitted to the ICU.  Initial VBG was  7.30/60/30/31  3/31- given increased work of breathing on BiPAP a decision was made to intubate.  Patient was agreeable as well as his POA nephew.    Plan:  Plan SBT today   Xopenex/Atrovent  Airway clearance protocol.  NPO.  Continue antibiotics as below.  Septic shock (HCC)  Assessment:  Presented with septic shock secondary to pneumonia given leukocytosis and elevated lactate.  Bandemia.  Remains afebrile  Negative MRSA.  Negative urine culture.  Negative pleural fluid culture.  Trending down procalcitonin.  Positive bronchial culture also for gram-positive cocci in pairs.  3/31- Patient had low blood pressure with systolic in the 80s and diastolic in the 50s after his bronchoscopy.  He received albumin and 1 L Isolyte with no response.  He eventually was placed on Levophed drip.     Plan:  Patient is off vasopressors.  Follow-up blood cultures.  Continue antibiotics as below.  Pneumonia  Patient has pneumonia secondary to aspiration with possible loculated parapneumonic effusion.  3/31- chest tube was placed and bronchoscopy for mucous plugging at bedside.  Pleural fluid is positive for lights criteria indicating exudative.  pH 7.4  Negative strep and  Legionella.    Plan:  Continue broad-spectrum antibiotics vancomycin and cefepime pending cultures day #3.  tPA ordered for chest tube.  Follow-up final bronchial cultures.  Trend fever curve.  CBC daily.    Acute kidney injury superimposed on stage 2 chronic kidney disease (HCC)  Lab Results   Component Value Date    EGFR 33 04/02/2025    EGFR 30 04/01/2025    EGFR 28 03/31/2025    CREATININE 1.80 (H) 04/02/2025    CREATININE 1.93 (H) 04/01/2025    CREATININE 2.06 (H) 03/31/2025     Baseline creatinine (0.9-1.2)  PTA creatinine 1.92  BUN/creatinine ratio > 20  Likely prerenal/ATN etiology given poor oral intake and septic shock.    Plan:  Decrease LR to 50 mL/hour once better oral intake.   Monitor I's and O's  Daily BMP  Avoid nephrotoxic/hypotensive agents  (HFpEF) heart failure with preserved ejection fraction (HCC)  Wt Readings from Last 3 Encounters:   04/02/25 77.7 kg (171 lb 4.8 oz)   02/21/24 77.6 kg (171 lb)   01/03/24 77.6 kg (171 lb)     Echocardiogram this admission shows EF 73%.  Normal diastolic function.  Mild aortic stenosis.  Home torsemide is on hold given low blood pressure and MELANY.  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HCC)  Continue home Sinemet  3 times a day through OG tube.   Pulmonary emphysema (HCC)  Last PFT 2020: Moderate obstructive lung disease with hyperinflation and low DLCO.  Home inhalers include Pulmicort, Spiriva, and albuterol.    GERD (gastroesophageal reflux disease)  Continue home Protonix  Depression  Continue OP Mirtazapine 15 mg daily at bedtime.  Continue home Lexapro.  Anemia  Hemoglobin dropping from 13.3 on admission to 9.2  Last hemoglobin in 6/2024 was 10.7.  No signs of bleeding.  The initial reading was likely secondary to dehydration.     Ambulatory dysfunction  Fall precautions  Consult PT and OT  Disposition: Critical care    ICU Core Measures     Vented Patient  VAP Bundle  VAP bundle ordered     A: Assess, Prevent, and Manage Pain Has pain been assessed? Yes  Need for  changes to pain regimen? No   B: Both Spontaneous Awakening Trials (SATs) and Spontaneous Breathing Trials (SBTs) Plan to perform spontaneous awakening trial today? Yes   Plan to perform spontaneous breathing trial today? Yes   Obvious barriers to extubation? Yes   C: Choice of Sedation RASS Goal: -1 Drowsy  Need for changes to sedation or analgesia regimen? No   D: Delirium CAM-ICU: Negative   E: Early Mobility  Plan for early mobility? No   F: Family Engagement Plan for family engagement today? Yes       Antibiotic Review: Patient on appropriate coverage based on culture data.     Review of Invasive Devices:    [place] Plan: Continue for accurate I/O monitoring for 48 hours        Prophylaxis:  VTE VTE covered by:  heparin (porcine), Subcutaneous, 5,000 Units at 04/02/25 0504       Stress Ulcer  covered byomeprazole (PriLOSEC) 20 mg delayed release capsule [[id number]] (Long-Term Med), pantoprazole (PROTONIX) EC tablet 40 mg [[id number]]         24 Hour Events :   Overnight, he was more awake compared to the day and received fentanyl PRN X 4.  Had some readings of low blood pressure with low 60s and received albumin.     Subjective    Patient was seen and examined this morning.  He was calm and following commands.  Denied any pain.      Objective :                   Vitals I/O      Most Recent Min/Max in 24hrs   Temp 99.5 °F (37.5 °C) Temp  Min: 98.5 °F (36.9 °C)  Max: 99.8 °F (37.7 °C)   Pulse 83 Pulse  Min: 81  Max: 105   Resp (!) 38 Resp  Min: 18  Max: 38   BP 98/55 BP  Min: 84/49  Max: 127/60   O2 Sat 97 % SpO2  Min: 94 %  Max: 98 %      Intake/Output Summary (Last 24 hours) at 4/2/2025 0629  Last data filed at 4/2/2025 0400  Gross per 24 hour   Intake 3296.03 ml   Output 1790 ml   Net 1506.03 ml       Diet NPO    Invasive Monitoring   No invasive monitoring        Physical Exam   Physical Exam  Eyes:      Extraocular Movements: Extraocular movements intact.      Pupils: Pupils are equal, round, and reactive to  light.   Skin:     General: Skin is warm.   HENT:      Head: Normocephalic and atraumatic.      Nose: No congestion.      Mouth/Throat:      Mouth: Mucous membranes are dry.   Cardiovascular:      Rate and Rhythm: Normal rate and regular rhythm.   Musculoskeletal:      Right lower leg: No edema.      Left lower leg: No edema.   Abdominal:      Palpations: Abdomen is soft.      Tenderness: There is no abdominal tenderness.   Constitutional:       General: He is sleeping.      Appearance: He is well-developed. He is ill-appearing.      Interventions: He is intubated.   Pulmonary:      Effort: Pulmonary effort is normal. He is intubated.      Breath sounds: Normal breath sounds.   Neurological:      Mental Status: He is somnolent and calm.   Genitourinary/Anorectal:  Singer present.        Diagnostic Studies        Lab Results: I have reviewed the following results:     Medications:  Scheduled PRN   alteplase (CATHFLO) 10 mg in sterile water 30 mL instillation, 10 mg, BID  budesonide, 0.25 mg, BID  carbidopa-levodopa, 1 tablet, TID  cefepime, 1,000 mg, Q12H  chlorhexidine, 15 mL, Q12H LINDA  ergocalciferol, 50,000 Units, Weekly  escitalopram, 5 mg, Daily  heparin (porcine), 5,000 Units, Q8H LINDA  ipratropium, 0.5 mg, TID  levalbuterol, 1.25 mg, TID  melatonin, 3 mg, HS  mirtazapine, 15 mg, HS  pantoprazole, 40 mg, Early Morning  rOPINIRole, 0.5 mg, HS  senna-docusate sodium, 1 tablet, HS  [Held by provider] torsemide, 20 mg, Daily      fentaNYL, 50 mcg, Q4H PRN  vancomycin, 15 mg/kg, Daily PRN       Continuous    lactated ringers, 75 mL/hr, Last Rate: 75 mL/hr (04/01/25 1132)         Labs:   CBC    Recent Labs     03/31/25  0754 04/01/25  0503 04/02/25  0432   WBC 62.12* 46.84* 34.53*   HGB 11.7* 9.2* 9.4*   HCT 35.9* 28.0* 28.8*   * 463* 416*   BANDSPCT 21*  --   --      BMP    Recent Labs     04/01/25  0503 04/02/25  0432   SODIUM 134* 135   K 4.4 4.8   CL 97 100   CO2 30 28   AGAP 7 7   BUN 44* 52*   CREATININE  1.93* 1.80*   CALCIUM 8.3* 8.3*       Coags    No recent results     Additional Electrolytes  Recent Labs     04/02/25  0432   MG 2.2   CAIONIZED 1.06*          Blood Gas    No recent results  No recent results LFTs  Recent Labs     03/31/25  0754 04/01/25  0503   ALT 21 3*   AST 30 14   ALKPHOS 101 70   ALB 2.4* 2.3*   TBILI 0.62 0.56       Infectious  Recent Labs     04/01/25  0503 04/02/25  0432   PROCALCITONI 17.62* 12.49*     Glucose  Recent Labs     03/31/25  0754 04/01/25  0503 04/02/25  0432   GLUC 203* 155* 119

## 2025-04-02 NOTE — ASSESSMENT & PLAN NOTE
Wt Readings from Last 3 Encounters:   04/02/25 77.7 kg (171 lb 4.8 oz)   02/21/24 77.6 kg (171 lb)   01/03/24 77.6 kg (171 lb)     Echocardiogram this admission shows EF 73%.  Normal diastolic function.  Mild aortic stenosis.  Home torsemide is on hold given low blood pressure and MELANY.

## 2025-04-02 NOTE — QUICK NOTE
Critical Care   Den Warren 84 y.o. male MRN: [id number]  Unit/Bed#: ICU 04 Encounter: [id number]    tPA/Dornase ordered by Pulmonology. Administered dose # 3/6. Chest tube was instilled with 10mg tPA (30mL) and 5mg dornase (30mL). Chest tube clamped at 1150 AM.  The medications were scanned by RN and the plan to unclamp at 1250 PM. The patient tolerated without complication or complaints. For any concerns or for any change in patient condition, contact Critical Care AP via [employer] Secure Chat.     Reviewed plan with RN

## 2025-04-02 NOTE — HOSPITAL COURSE
83 y/o M hx Parkinson's, CKD II, mild AS, emphysema, GERD, ambulatory dysfunction who was admitted 3/31 for septic shock and acute hypoxemic respiratory failure due to aspiration PNA requiring invasive mechanical ventilation. Initially on BiPAP, CT chest demonstrated large loculated parapneumonic effusion of RML and RLL. Abx coverage w/ cefepime/vanco. 3/31 w/ increased work of breathing, subsequently intubated and chest tube placed for effusion drainage. Pleural fluid culture negative. Bronchoscopy done for pulmonary toileting and mucous plugging, culture positive GPC/GPR. MRSA nares positive.Required vasopressors 3/31-4/1. Echo showed EF 73%, normal diastolic function, mild AS. TPA/dornase started 4/1. SBT 4/1 w/ tachypnea prohibiting extubation. On 4/6 2025 patient family decided on extubation to comfort care.His code status was transitioned to Level 4.  Hospice consult was placed on 4/7/2025 and chest tube removed. Case management arranged for transfer back to [facility] on hospice.

## 2025-04-02 NOTE — ASSESSMENT & PLAN NOTE
Assessment:  Patient presented with progressive worsening of shortness of breath for 1 week from [facility].   In the ED, he was requiring BiPAP for increased work of breathing and hypoxemia.  Received ceftriaxone and azithromycin.  CT chest:  Large loculated right pleural effusion with dense consolidations within the RML and RLL.   Occlusion of the right bronchus intermedius.  Given continuous BiPAP requirement patient was admitted to the ICU.  Initial VBG was  7.30/60/30/31  3/31- given increased work of breathing on BiPAP a decision was made to intubate.  Patient was agreeable as well as his POA nephew.    Plan:  Plan SBT today   Xopenex/Atrovent  Airway clearance protocol.  NPO.  Continue antibiotics as below.

## 2025-04-02 NOTE — QUICK NOTE
04/01/25     Intrapleural Instillation of tPA/DNase - Dose #2/6     Under sterile conditions, R chest tube was flushed with 10 ml of 0.9% saline to confirm tube patency. 10mg tPA (30mL) and 5mg dornase (30mL) instilled via tube at 2327. The patient tolerated without complaints. Plan to unclamp at 0027 and return to wall suction. D/W RN. The medications were scanned into medical record by RN.     For any concerns or for any change in patient condition, contact Critical Care Team via [employer] Secure Chat.     David Ortiz PA-C

## 2025-04-02 NOTE — ASSESSMENT & PLAN NOTE
Hemoglobin dropping from 13.3 on admission to 9.2  Last hemoglobin in 6/2024 was 10.7.  No signs of bleeding.  The initial reading was likely secondary to dehydration.

## 2025-04-02 NOTE — ASSESSMENT & PLAN NOTE
Lab Results   Component Value Date    EGFR 33 04/02/2025    EGFR 30 04/01/2025    EGFR 28 03/31/2025    CREATININE 1.80 (H) 04/02/2025    CREATININE 1.93 (H) 04/01/2025    CREATININE 2.06 (H) 03/31/2025     Baseline creatinine (0.9-1.2)  PTA creatinine 1.92  BUN/creatinine ratio > 20  Likely prerenal/ATN etiology given poor oral intake and septic shock.    Plan:  Decrease LR to 50 mL/hour once better oral intake.   Monitor I's and O's  Daily BMP  Avoid nephrotoxic/hypotensive agents

## 2025-04-02 NOTE — OCCUPATIONAL THERAPY NOTE
Occupational Therapy Cancel Note:    Patient Name: Den Warren  Today's Date: 4/2/2025    OT consult received. Chart reviewed. Pt remains intubated at this time, not appropriate for OT evaluation. Will D/C OT orders and request new orders as medically appropriate.    Carly Leon, OTR/L

## 2025-04-02 NOTE — SPEECH THERAPY NOTE
Speech Language/Pathology  Speech/Language Pathology  Assessment    Patient Name: Den Warren  Today's Date: 4/2/2025     Problem List  Principal Problem:    (HFpEF) heart failure with preserved ejection fraction (HCC)  Active Problems:    Pulmonary emphysema (HCC)    Ambulatory dysfunction    Depression    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HCC)    Pleural effusion    Acute hypoxemic and hypercarbic respiratory failure (HCC)    Pneumonia    GERD (gastroesophageal reflux disease)    Lactate blood increase    Septic shock (HCC)    Anemia    Acute kidney injury superimposed on stage 2 chronic kidney disease (HCC)    Past Medical History  Past Medical History:   Diagnosis Date    BPH (benign prostatic hyperplasia)     COPD (chronic obstructive pulmonary disease) (HCC)     Crohn's disease (HCC)     Depression     Restless legs syndrome      Past Surgical History  Past Surgical History:   Procedure Laterality Date    RECTAL SURGERY      abscess removed          Bedside Swallow Evaluation:    Summary:  Pt presented w/ intubation 3/31/25.   Received another order however the pt is still intubated.  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    *Please reconsult when extubated.      Special Studies:  Cxr 3/31/25:  Patchy right lower lobe consolidation. Moderate right and small left pleural effusion. Right chest tube in place.   CXR 3/31:  Endotracheal and enteric tubes in satisfactory position.  No significant interval change.  CT chest 3/31:  1.  Large, likely loculated right pleural effusion, with dense consolidations within the right middle lobe and right lower lobe. There is also occlusion of the right bronchus intermedius. This may be due to aspiration and postobstructive pneumonia, with   superimposed compressive atelectasis. However, a short-term follow-up chest CT is recommended to exclude an underlying mass.  2.  Small nonspecific pulmonary nodules.    6/28/24 Routine barium  Swallow LVH  Impression  Impression:  Esophageal dysmotility with ineffectual clearing of contrast bolus from the esophagus with multiple secondary and tertiary contractions identified. Consider EGD as clinically indicated.  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     Previous MBS:  6/27/24 LVH:  DIET Recommendations: Regular, Level 0 Thin Liquids  [x]Meds: crushed/whole in puree   ASSESSMENT: Mild oropharyngeal dysphagia with slow mastication, reduced control with delayed swallow as PO reached valleculae/pyriform sinus prior to swallow. Adequate hyolaryngeal elevation/excursion with epiglottis inverting to protect airway. Some reduced BOT retraction. Mild pharyngeal retention. No penetration or aspiration on study.  7 - Total oral diet with no restriction   Strategies Attempted  *Cued for double swallow to clear pharyngeal retention- effective.   Additional Comments  *Abnormality noted in pharynx/esophagus as seen below. Does not impact flow of bolus. Please correlate via radiologist vs medical team. Sent to MD Shawna and MD Li.      Food Allergies:  nkfa   Current Diet:  NPO   Premorbid diet:  Regular w/ thin (checked records)   O2 requirement:  HFNC  55   Social/Prior living  [facility] at [facility]   Voice/Speech:     Follows commands:     Cognitive status:

## 2025-04-02 NOTE — PROGRESS NOTES
Den Warren is a 84 y.o. male who is currently ordered Vancomycin IV with management by the Pharmacy Consult Service.  Relevant clinical data and objective / subjective history reviewed.  Vancomycin Assessment:  Indication and Goal AUC/Trough:  Pneumonia (goal -600, trough >10)  Clinical Status: stable  Micro:          Renal Function:  SCr: 1.8 mg/dL from 1.93 mg/dL; baseline 1 mg/L  CrCl: 32 mL/min  UOP: 0.6 mL/kg/hr in the last 24 hours  Renal replacement: Not on dialysis  Days of Therapy: 3  Current Dose:  1,250 mg daily PRN vancomycin level </= 15 mcg/dL  Vancomycin Plan:  New Dosing:  Continue pulse dosing, but decrease dose to 750 mg daily PRN vancomycin level </= 15 mcg/dL. No dose to be administered today  Next Level: Random level with AM labs on 4/3  Renal Function Monitoring: Daily BMP and UOP assessment  Pharmacy will continue to follow closely for s/sx of nephrotoxicity, infusion reactions and appropriateness of therapy.  BMP and CBC will be ordered per protocol. We will continue to follow the patient’s culture results and clinical progress daily.    Nataly Grijalva, PharmD, BCCCP  Critical Care and Internal Medicine Clinical Pharmacist  [phone] or via Secure Chat

## 2025-04-02 NOTE — QUICK NOTE
04/02/25     Intrapleural Instillation of tPA/DNase - Dose #4/6     Under sterile conditions, R chest tube was flushed with 10 ml of 0.9% saline to confirm tube patency. 10mg tPA (30mL) and 5mg dornase (30mL) instilled via tube at 2347. The patient tolerated without complaints. Plan to unclamp at 0047 and return to [facility]. D/W RN. The medications were scanned into medical record by RN.     For any concerns or for any change in patient condition, contact Critical Care Team via [employer] Secure Chat.     David Ortiz PA-C

## 2025-04-02 NOTE — ASSESSMENT & PLAN NOTE
Patient has pneumonia secondary to aspiration with possible loculated parapneumonic effusion.  3/31- chest tube was placed and bronchoscopy for mucous plugging at bedside.  Pleural fluid is positive for lights criteria indicating exudative.  pH 7.4  Negative strep and Legionella.    Plan:  Continue broad-spectrum antibiotics vancomycin and cefepime pending cultures day #3.  tPA ordered for chest tube.  Follow-up final bronchial cultures.  Trend fever curve.  CBC daily.

## 2025-04-03 ENCOUNTER — APPOINTMENT (INPATIENT)
Dept: RADIOLOGY | Facility: HOSPITAL | Age: 85
DRG: 870 | End: 2025-04-03
Payer: MEDICARE

## 2025-04-03 LAB
ANION GAP SERPL CALCULATED.3IONS-SCNC: 6 MMOL/L (ref 4–13)
BACTERIA BRONCH AEROBE CULT: ABNORMAL
BACTERIA BRONCH AEROBE CULT: ABNORMAL
BASOPHILS # BLD AUTO: 0.04 THOUSANDS/ÂΜL (ref 0–0.1)
BASOPHILS NFR BLD AUTO: 0 % (ref 0–1)
BUN SERPL-MCNC: 58 MG/DL (ref 5–25)
CA-I BLD-SCNC: 1.12 MMOL/L (ref 1.12–1.32)
CALCIUM SERPL-MCNC: 8.1 MG/DL (ref 8.4–10.2)
CHLORIDE SERPL-SCNC: 103 MMOL/L (ref 96–108)
CO2 SERPL-SCNC: 28 MMOL/L (ref 21–32)
CREAT SERPL-MCNC: 1.86 MG/DL (ref 0.6–1.3)
EOSINOPHIL # BLD AUTO: 0.01 THOUSAND/ÂΜL (ref 0–0.61)
EOSINOPHIL NFR BLD AUTO: 0 % (ref 0–6)
ERYTHROCYTE [DISTWIDTH] IN BLOOD BY AUTOMATED COUNT: 14 % (ref 11.6–15.1)
GFR SERPL CREATININE-BSD FRML MDRD: 32 ML/MIN/1.73SQ M
GLUCOSE SERPL-MCNC: 159 MG/DL (ref 65–140)
GRAM STN SPEC: ABNORMAL
HCT VFR BLD AUTO: 27.8 % (ref 36.5–49.3)
HGB BLD-MCNC: 9.3 G/DL (ref 12–17)
IMM GRANULOCYTES # BLD AUTO: >0.5 THOUSAND/UL (ref 0–0.2)
IMM GRANULOCYTES NFR BLD AUTO: 2 % (ref 0–2)
LYMPHOCYTES # BLD AUTO: 0.59 THOUSANDS/ÂΜL (ref 0.6–4.47)
LYMPHOCYTES NFR BLD AUTO: 2 % (ref 14–44)
MAGNESIUM SERPL-MCNC: 2.5 MG/DL (ref 1.9–2.7)
MCH RBC QN AUTO: 28.1 PG (ref 26.8–34.3)
MCHC RBC AUTO-ENTMCNC: 33.5 G/DL (ref 31.4–37.4)
MCV RBC AUTO: 84 FL (ref 82–98)
MONOCYTES # BLD AUTO: 1.6 THOUSAND/ÂΜL (ref 0.17–1.22)
MONOCYTES NFR BLD AUTO: 6 % (ref 4–12)
NEUTROPHILS # BLD AUTO: 24.25 THOUSANDS/ÂΜL (ref 1.85–7.62)
NEUTS SEG NFR BLD AUTO: 90 % (ref 43–75)
NRBC BLD AUTO-RTO: 0 /100 WBCS
PHOSPHATE SERPL-MCNC: 4.1 MG/DL (ref 2.3–4.1)
PLATELET # BLD AUTO: 363 THOUSANDS/UL (ref 149–390)
PMV BLD AUTO: 10 FL (ref 8.9–12.7)
POTASSIUM SERPL-SCNC: 4.5 MMOL/L (ref 3.5–5.3)
RBC # BLD AUTO: 3.31 MILLION/UL (ref 3.88–5.62)
SODIUM SERPL-SCNC: 137 MMOL/L (ref 135–147)
VANCOMYCIN SERPL-MCNC: 11.4 UG/ML (ref 10–20)
WBC # BLD AUTO: 27.09 THOUSAND/UL (ref 4.31–10.16)

## 2025-04-03 PROCEDURE — 83735 ASSAY OF MAGNESIUM: CPT

## 2025-04-03 PROCEDURE — 94760 N-INVAS EAR/PLS OXIMETRY 1: CPT

## 2025-04-03 PROCEDURE — 80202 ASSAY OF VANCOMYCIN: CPT | Performed by: INTERNAL MEDICINE

## 2025-04-03 PROCEDURE — 71045 X-RAY EXAM CHEST 1 VIEW: CPT

## 2025-04-03 PROCEDURE — 84100 ASSAY OF PHOSPHORUS: CPT

## 2025-04-03 PROCEDURE — 99291 CRITICAL CARE FIRST HOUR: CPT | Performed by: EMERGENCY MEDICINE

## 2025-04-03 PROCEDURE — 85025 COMPLETE CBC W/AUTO DIFF WBC: CPT

## 2025-04-03 PROCEDURE — 82330 ASSAY OF CALCIUM: CPT

## 2025-04-03 PROCEDURE — 94150 VITAL CAPACITY TEST: CPT

## 2025-04-03 PROCEDURE — 3E0L317 INTRODUCTION OF OTHER THROMBOLYTIC INTO PLEURAL CAVITY, PERCUTANEOUS APPROACH: ICD-10-PCS | Performed by: EMERGENCY MEDICINE

## 2025-04-03 PROCEDURE — 94003 VENT MGMT INPAT SUBQ DAY: CPT

## 2025-04-03 PROCEDURE — 94640 AIRWAY INHALATION TREATMENT: CPT

## 2025-04-03 PROCEDURE — 80048 BASIC METABOLIC PNL TOTAL CA: CPT

## 2025-04-03 RX ADMIN — IPRATROPIUM BROMIDE 0.5 MG: 0.5 SOLUTION RESPIRATORY (INHALATION) at 19:40

## 2025-04-03 RX ADMIN — LEVALBUTEROL HYDROCHLORIDE 1.25 MG: 1.25 SOLUTION RESPIRATORY (INHALATION) at 07:18

## 2025-04-03 RX ADMIN — BUDESONIDE 0.25 MG: 0.25 INHALANT RESPIRATORY (INHALATION) at 19:40

## 2025-04-03 RX ADMIN — LEVALBUTEROL HYDROCHLORIDE 1.25 MG: 1.25 SOLUTION RESPIRATORY (INHALATION) at 14:05

## 2025-04-03 RX ADMIN — CARBIDOPA AND LEVODOPA 1 TABLET: 25; 100 TABLET ORAL at 15:44

## 2025-04-03 RX ADMIN — SODIUM CHLORIDE, SODIUM LACTATE, POTASSIUM CHLORIDE, AND CALCIUM CHLORIDE 75 ML/HR: .6; .31; .03; .02 INJECTION, SOLUTION INTRAVENOUS at 03:42

## 2025-04-03 RX ADMIN — HEPARIN SODIUM 5000 UNITS: 5000 INJECTION INTRAVENOUS; SUBCUTANEOUS at 13:38

## 2025-04-03 RX ADMIN — CHLORHEXIDINE GLUCONATE 0.12% ORAL RINSE 15 ML: 1.2 LIQUID ORAL at 20:19

## 2025-04-03 RX ADMIN — FENTANYL CITRATE 50 MCG: 50 INJECTION INTRAMUSCULAR; INTRAVENOUS at 16:12

## 2025-04-03 RX ADMIN — CEFTRIAXONE 2000 MG: 10 INJECTION, POWDER, FOR SOLUTION INTRAVENOUS at 17:25

## 2025-04-03 RX ADMIN — IPRATROPIUM BROMIDE 0.5 MG: 0.5 SOLUTION RESPIRATORY (INHALATION) at 14:05

## 2025-04-03 RX ADMIN — ALTEPLASE 10 MG: 2.2 INJECTION, POWDER, LYOPHILIZED, FOR SOLUTION INTRAVENOUS at 11:16

## 2025-04-03 RX ADMIN — SENNOSIDES AND DOCUSATE SODIUM 1 TABLET: 50; 8.6 TABLET ORAL at 21:01

## 2025-04-03 RX ADMIN — FENTANYL CITRATE 50 MCG: 50 INJECTION INTRAMUSCULAR; INTRAVENOUS at 00:19

## 2025-04-03 RX ADMIN — HEPARIN SODIUM 5000 UNITS: 5000 INJECTION INTRAVENOUS; SUBCUTANEOUS at 05:49

## 2025-04-03 RX ADMIN — IPRATROPIUM BROMIDE 0.5 MG: 0.5 SOLUTION RESPIRATORY (INHALATION) at 07:18

## 2025-04-03 RX ADMIN — CARBIDOPA AND LEVODOPA 1 TABLET: 25; 100 TABLET ORAL at 11:15

## 2025-04-03 RX ADMIN — CARBIDOPA AND LEVODOPA 1 TABLET: 25; 100 TABLET ORAL at 20:19

## 2025-04-03 RX ADMIN — CHLORHEXIDINE GLUCONATE 0.12% ORAL RINSE 15 ML: 1.2 LIQUID ORAL at 09:04

## 2025-04-03 RX ADMIN — DORNASE ALFA 5 MG: 1 SOLUTION RESPIRATORY (INHALATION) at 11:16

## 2025-04-03 RX ADMIN — ESCITALOPRAM OXALATE 5 MG: 10 TABLET ORAL at 09:04

## 2025-04-03 RX ADMIN — CEFEPIME 2000 MG: 2 INJECTION, POWDER, FOR SOLUTION INTRAVENOUS at 05:54

## 2025-04-03 RX ADMIN — HEPARIN SODIUM 5000 UNITS: 5000 INJECTION INTRAVENOUS; SUBCUTANEOUS at 21:01

## 2025-04-03 RX ADMIN — MELATONIN 3 MG: 3 TAB ORAL at 21:01

## 2025-04-03 RX ADMIN — LEVALBUTEROL HYDROCHLORIDE 1.25 MG: 1.25 SOLUTION RESPIRATORY (INHALATION) at 19:40

## 2025-04-03 RX ADMIN — MIRTAZAPINE 15 MG: 15 TABLET, FILM COATED ORAL at 21:01

## 2025-04-03 RX ADMIN — Medication 20 MG: at 09:04

## 2025-04-03 RX ADMIN — ROPINIROLE HYDROCHLORIDE 0.5 MG: 0.25 TABLET, FILM COATED ORAL at 21:02

## 2025-04-03 RX ADMIN — FENTANYL CITRATE 50 MCG: 50 INJECTION INTRAMUSCULAR; INTRAVENOUS at 23:48

## 2025-04-03 RX ADMIN — FENTANYL CITRATE 50 MCG: 50 INJECTION INTRAMUSCULAR; INTRAVENOUS at 09:07

## 2025-04-03 RX ADMIN — BUDESONIDE 0.25 MG: 0.25 INHALANT RESPIRATORY (INHALATION) at 07:18

## 2025-04-03 NOTE — PLAN OF CARE
Problem: Prexisting or High Potential for Compromised Skin Integrity  Goal: Skin integrity is maintained or improved  Description: INTERVENTIONS:- Identify patients at risk for skin breakdown- Assess and monitor skin integrity- Assess and monitor nutrition and hydration status- Monitor labs - Assess for incontinence - Turn and reposition patient- Assist with mobility/ambulation- Relieve pressure over bony prominences- Avoid friction and shearing- Provide appropriate hygiene as needed including keeping skin clean and dry- Evaluate need for skin moisturizer/barrier cream- Collaborate with interdisciplinary team - Patient/family teaching- Consider wound care consult   Outcome: Progressing     Problem: SAFETY ADULT  Goal: Patient will remain free of falls  Description: INTERVENTIONS:- Educate patient/family on patient safety including physical limitations- Instruct patient to call for assistance with activity - Consult OT/PT to assist with strengthening/mobility - Keep Call bell within reach- Keep bed low and locked with side rails adjusted as appropriate- Keep care items and personal belongings within reach- Initiate and maintain comfort rounds- Make Fall Risk Sign visible to staff-  Apply yellow socks and bracelet for high fall risk patients- Consider moving patient to room near nurses station  INTERVENTIONS:- Educate patient/family on patient safety including physical limitations- Instruct patient to call for assistance with activity - Consult OT/PT to assist with strengthening/mobility - Keep Call bell within reach- Keep bed low and locked with side rails adjusted as appropriate- Keep care items and personal belongings within reach- Initiate and maintain comfort rounds- Make Fall Risk Sign visible to staff- Apply yellow socks and bracelet for high fall risk patients- Consider moving patient to room near nurses station  Outcome: Progressing  Goal: Maintain or return to baseline ADL function  Description:  INTERVENTIONS:-  Assess patient's ability to carry out ADLs; assess patient's baseline for ADL function and identify physical deficits which impact ability to perform ADLs (bathing, care of mouth/teeth, toileting, grooming, dressing, etc.)- Assess/evaluate cause of self-care deficits - Assess range of motion- Assess patient's mobility; develop plan if impaired- Assess patient's need for assistive devices and provide as appropriate- Encourage maximum independence but intervene and supervise when necessary- Involve family in performance of ADLs- Assess for home care needs following discharge - Consider OT consult to assist with ADL evaluation and planning for discharge- Provide patient education as appropriate  INTERVENTIONS:-  Assess patient's ability to carry out ADLs; assess patient's baseline for ADL function and identify physical deficits which impact ability to perform ADLs (bathing, care of mouth/teeth, toileting, grooming, dressing, etc.)- Assess/evaluate cause of self-care deficits - Assess range of motion- Assess patient's mobility; develop plan if impaired- Assess patient's need for assistive devices and provide as appropriate- Encourage maximum independence but intervene and supervise when necessary- Involve family in performance of ADLs- Assess for home care needs following discharge - Consider OT consult to assist with ADL evaluation and planning for discharge- Provide patient education as appropriate  Outcome: Progressing  Goal: Maintains/Returns to pre admission functional level  Description: INTERVENTIONS:- Perform AM-PAC 6 Click Basic Mobility/ Daily Activity assessment daily.- Set and communicate daily mobility goal to care team and patient/family/caregiver. - Collaborate with rehabilitation services on mobility goals if consulted- Out of bed for toileting- Record patient progress and toleration of activity level   INTERVENTIONS:- Perform AM-PAC 6 Click Basic Mobility/ Daily Activity assessment  daily.- Set and communicate daily mobility goal to care team and patient/family/caregiver. - Collaborate with rehabilitation services on mobility goals if consulted- Out of bed for toileting- Record patient progress and toleration of activity level   Outcome: Progressing     Problem: DISCHARGE PLANNING  Goal: Discharge to home or other facility with appropriate resources  Description: INTERVENTIONS:- Identify barriers to discharge w/patient and caregiver- Arrange for needed discharge resources and transportation as appropriate- Identify discharge learning needs (meds, wound care, etc.)- Arrange for interpretive services to assist at discharge as needed- Refer to Case Management Department for coordinating discharge planning if the patient needs post-hospital services based on physician/advanced practitioner order or complex needs related to functional status, cognitive ability, or social support system  INTERVENTIONS:- Identify barriers to discharge w/patient and caregiver- Arrange for needed discharge resources and transportation as appropriate- Identify discharge learning needs (meds, wound care, etc.)- Arrange for interpretive services to assist at discharge as needed- Refer to Case Management Department for coordinating discharge planning if the patient needs post-hospital services based on physician/advanced practitioner order or complex needs related to functional status, cognitive ability, or social support system  Outcome: Progressing     Problem: Knowledge Deficit  Goal: Patient/family/caregiver demonstrates understanding of disease process, treatment plan, medications, and discharge instructions  Description: Complete learning assessment and assess knowledge base.Interventions:- Provide teaching at level of understanding- Provide teaching via preferred learning methods  Complete learning assessment and assess knowledge base.Interventions:- Provide teaching at level of understanding- Provide teaching via  preferred learning methods  Outcome: Progressing     Problem: PAIN - ADULT  Goal: Verbalizes/displays adequate comfort level or baseline comfort level  Description: Interventions:- Encourage patient to monitor pain and request assistance- Assess pain using appropriate pain scale- Administer analgesics based on type and severity of pain and evaluate response- Implement non-pharmacological measures as appropriate and evaluate response- Consider cultural and social influences on pain and pain management- Notify physician/advanced practitioner if interventions unsuccessful or patient reports new pain  Outcome: Progressing     Problem: INFECTION - ADULT  Goal: Absence or prevention of progression during hospitalization  Description: INTERVENTIONS:- Assess and monitor for signs and symptoms of infection- Monitor lab/diagnostic results- Monitor all insertion sites, i.e. indwelling lines, tubes, and drains- Monitor endotracheal if appropriate and nasal secretions for changes in amount and color- [place] appropriate cooling/warming therapies per order- Administer medications as ordered- Instruct and encourage patient and family to use good hand hygiene technique- Identify and instruct in appropriate isolation precautions for identified infection/condition  Outcome: Progressing     Problem: RESPIRATORY - ADULT  Goal: Achieves optimal ventilation and oxygenation  Description: INTERVENTIONS:- Assess for changes in respiratory status- Assess for changes in mentation and behavior- Position to facilitate oxygenation and minimize respiratory effort- Oxygen administered by appropriate delivery if ordered- Initiate smoking cessation education as indicated- Encourage broncho-pulmonary hygiene including cough, deep breathe, Incentive Spirometry- Assess the need for suctioning and aspirate as needed- Assess and instruct to report SOB or any respiratory difficulty- Respiratory Therapy support as indicated  Outcome: Progressing      Problem: Nutrition/Hydration-ADULT  Goal: Nutrient/Hydration intake appropriate for improving, restoring or maintaining nutritional needs  Description: Monitor and assess patient's nutrition/hydration status for malnutrition. Collaborate with interdisciplinary team and initiate plan and interventions as ordered.  Monitor patient's weight and dietary intake as ordered or per policy. Utilize nutrition screening tool and intervene as necessary. Determine patient's food preferences and provide high-protein, high-caloric foods as appropriate. INTERVENTIONS:- Monitor oral intake, urinary output, labs, and treatment plans- Assess nutrition and hydration status and recommend course of action- Evaluate amount of meals eaten- Assist patient with eating if necessary - Allow adequate time for meals- Recommend/ encourage appropriate diets, oral nutritional supplements, and vitamin/mineral supplements- Order, calculate, and assess calorie counts as needed- Recommend, monitor, and adjust tube feedings and TPN/PPN based on assessed needs- Assess need for intravenous fluids- Provide specific nutrition/hydration education as appropriate- Include patient/family/caregiver in decisions related to nutrition  Outcome: Progressing     Problem: COPING  Goal: Pt/Family able to verbalize concerns and demonstrate effective coping strategies  Description: INTERVENTIONS:- Assist patient/family to identify coping skills, available support systems and cultural and spiritual values- Provide emotional support, including active listening and acknowledgement of concerns of patient and caregivers- Reduce environmental stimuli, as able- Provide patient education- Assess for spiritual pain/suffering and initiate spiritual care, including notification of Pastoral Care or randal based community as needed- Assess effectiveness of coping strategies  Outcome: Progressing  Goal: Will report anxiety at manageable levels  Description: INTERVENTIONS:- Administer  medication as ordered- Teach and encourage coping skills- Provide emotional support- Assess patient/family for anxiety and ability to cope  Outcome: Progressing     Problem: SAFETY,RESTRAINT: NV/NON-SELF DESTRUCTIVE BEHAVIOR  Goal: Remains free of harm/injury (restraint for non violent/non self-detsructive behavior)  Description: INTERVENTIONS:- Instruct patient/family regarding restraint use - Assess and monitor physiologic and psychological status - Provide interventions and comfort measures to meet assessed patient needs - Identify and implement measures to help patient regain control- Assess readiness for release of restraint   Outcome: Progressing  Goal: Returns to optimal restraint-free functioning  Description: INTERVENTIONS:- Assess the patient's behavior and symptoms that indicate continued need for restraint- Identify and implement measures to help patient regain control- Assess readiness for release of restraint   Outcome: Progressing

## 2025-04-03 NOTE — CONSULTS
Vancomycin therapy has been discontinued. Pharmacy will sign off. Thank you for this consult. Please do not hesitate to call us with questions or re-consult us if the need arises.    Nataly Grijalva, PharmD, [facility]  Critical Care and Internal Medicine Clinical Pharmacist  [phone] or via Secure Chat

## 2025-04-03 NOTE — PLAN OF CARE
Problem: Prexisting or High Potential for Compromised Skin Integrity  Goal: Skin integrity is maintained or improved  Description: INTERVENTIONS:- Identify patients at risk for skin breakdown- Assess and monitor skin integrity- Assess and monitor nutrition and hydration status- Monitor labs - Assess for incontinence - Turn and reposition patient- Assist with mobility/ambulation- Relieve pressure over bony prominences- Avoid friction and shearing- Provide appropriate hygiene as needed including keeping skin clean and dry- Evaluate need for skin moisturizer/barrier cream- Collaborate with interdisciplinary team - Patient/family teaching- Consider wound care consult   Outcome: Progressing        Problem: PAIN - ADULT  Goal: Verbalizes/displays adequate comfort level or baseline comfort level  Description: Interventions:- Encourage patient to monitor pain and request assistance- Assess pain using appropriate pain scale- Administer analgesics based on type and severity of pain and evaluate response- Implement non-pharmacological measures as appropriate and evaluate response- Consider cultural and social influences on pain and pain management- Notify physician/advanced practitioner if interventions unsuccessful or patient reports new pain  Outcome: Progressing     Problem: INFECTION - ADULT  Goal: Absence or prevention of progression during hospitalization  Description: INTERVENTIONS:- Assess and monitor for signs and symptoms of infection- Monitor lab/diagnostic results- Monitor all insertion sites, i.e. indwelling lines, tubes, and drains- Monitor endotracheal if appropriate and nasal secretions for changes in amount and color- Winter appropriate cooling/warming therapies per order- Administer medications as ordered- Instruct and encourage patient and family to use good hand hygiene technique- Identify and instruct in appropriate isolation precautions for identified infection/condition  Outcome: Progressing      Problem: RESPIRATORY - ADULT  Goal: Achieves optimal ventilation and oxygenation  Description: INTERVENTIONS:- Assess for changes in respiratory status- Assess for changes in mentation and behavior- Position to facilitate oxygenation and minimize respiratory effort- Oxygen administered by appropriate delivery if ordered- Initiate smoking cessation education as indicated- Encourage broncho-pulmonary hygiene including cough, deep breathe, Incentive Spirometry- Assess the need for suctioning and aspirate as needed- Assess and instruct to report SOB or any respiratory difficulty- Respiratory Therapy support as indicated  Outcome: Progressing     Problem: Nutrition/Hydration-ADULT  Goal: Nutrient/Hydration intake appropriate for improving, restoring or maintaining nutritional needs  Description: Monitor and assess patient's nutrition/hydration status for malnutrition. Collaborate with interdisciplinary team and initiate plan and interventions as ordered.  Monitor patient's weight and dietary intake as ordered or per policy. Utilize nutrition screening tool and intervene as necessary. Determine patient's food preferences and provide high-protein, high-caloric foods as appropriate. INTERVENTIONS:- Monitor oral intake, urinary output, labs, and treatment plans- Assess nutrition and hydration status and recommend course of action- Evaluate amount of meals eaten- Assist patient with eating if necessary - Allow adequate time for meals- Recommend/ encourage appropriate diets, oral nutritional supplements, and vitamin/mineral supplements- Order, calculate, and assess calorie counts as needed- Recommend, monitor, and adjust tube feedings and TPN/PPN based on assessed needs- Assess need for intravenous fluids- Provide specific nutrition/hydration education as appropriate- Include patient/family/caregiver in decisions related to nutrition  Outcome: Progressing

## 2025-04-03 NOTE — QUICK NOTE
Critical Care   Den Warren 84 y.o. male MRN: [id number]  Unit/Bed#: ICU 04 Encounter: [id number]     tPA/Dornase ordered by Pulmonology. Administered dose # 5/6. Chest tube was instilled with 10mg tPA (30mL) and 5mg dornase (30mL). Chest tube clamped at 1110 AM.  The medications were scanned by RN and the plan to unclamp at 1210 PM. The patient tolerated without complication or complaints. For any concerns or for any change in patient condition, contact Critical Care [facility] via [employer] Secure Chat.      Reviewed plan with RN

## 2025-04-03 NOTE — ASSESSMENT & PLAN NOTE
Assessment:  Patient presented with progressive worsening of shortness of breath for 1 week from [facility].   In the ED, he was requiring BiPAP for increased work of breathing and hypoxemia.  Received ceftriaxone and azithromycin.  CT chest:  Large loculated right pleural effusion with dense consolidations within the RML and RLL.   Occlusion of the right bronchus intermedius.  Given continuous BiPAP requirement patient was admitted to the ICU.  Initial VBG was  7.30/60/30/31  3/31- given increased work of breathing on BiPAP a decision was made to intubate.  Patient was agreeable as well as his POA nephew.  SBT on 4/2 with high RSBI on pressure support 6/6    Plan:  Plan SBT today   Fentanyl as needed for sedation.  Xopenex/Atrovent  Airway clearance protocol.  Tube feeds   Continue antibiotics as below.

## 2025-04-03 NOTE — ASSESSMENT & PLAN NOTE
Wt Readings from Last 3 Encounters:   04/03/25 78.3 kg (172 lb 9.9 oz)   02/21/24 77.6 kg (171 lb)   01/03/24 77.6 kg (171 lb)     Echocardiogram this admission shows EF 73%.  Normal diastolic function.  Mild aortic stenosis.  Consider restarting Home torsemide with blood pressure parameters.

## 2025-04-03 NOTE — CASE MANAGEMENT
Case Management Discharge Planning Note    Patient name Den Warren  Location ICU 04/ICU 04 MRN [id number]  : 1940 Date 4/3/2025       Current Admission Date: 3/30/2025  Current Admission Diagnosis:(HFpEF) heart failure with preserved ejection fraction (HCC)   Patient Active Problem List    Diagnosis Date Noted Date Diagnosed    Septic shock (HCC) 2025     Anemia 2025     Acute kidney injury superimposed on stage 2 chronic kidney disease (HCC) 2025     Pleural effusion 2025     Acute hypoxemic and hypercarbic respiratory failure (HCC) 2025     Pneumonia 2025     GERD (gastroesophageal reflux disease) 2025     (HFpEF) heart failure with preserved ejection fraction (HCC) 2025     Lactate blood increase 2025     Pulmonary emphysema, unspecified emphysema type ([facility]) 2024     Other constipation 10/25/2023     Nausea 2023     Lung nodule 2023 06/15/2023    Grade I diastolic dysfunction 2023     Venous insufficiency (chronic) (peripheral) 10/19/2022     Peripheral edema 10/14/2022     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[facility]) 2022     Depression 2022     Ambulatory dysfunction 2022     Acute exacerbation of chronic obstructive pulmonary disease (COPD) ([facility]) 2022     Hemorrhoids 2022     BPH (benign prostatic hyperplasia) 2022     Anxiety 2022     Pulmonary emphysema (HCC) 2022     Anal sphincter incompetence 2022     Former smoker 2020       LOS (days): 3  Geometric Mean LOS (GMLOS) (days): 4.9  Days to GMLOS:1.5     OBJECTIVE:  Risk of Unplanned Readmission Score: 17.41         Current admission status: Inpatient   Preferred Pharmacy:   [facility]  - , [place] - [address]  [address]  [place] [address]  Phone: [phone] Fax: [phone]    Primary Care Provider: No primary care provider on file.    Primary Insurance: MEDICARE  Secondary  Insurance: [employer]    DISCHARGE DETAILS:         Additional Comments: Patient remains intubated and is not medically ready to return to his LTC facility. Patient is scheduled for SBT today. CM will continue to follow for discharge planning.

## 2025-04-03 NOTE — ASSESSMENT & PLAN NOTE
Lab Results   Component Value Date    EGFR 32 04/03/2025    EGFR 33 04/02/2025    EGFR 30 04/01/2025    CREATININE 1.86 (H) 04/03/2025    CREATININE 1.80 (H) 04/02/2025    CREATININE 1.93 (H) 04/01/2025     Baseline creatinine (0.9-1.2)  PTA creatinine 1.92  BUN/creatinine ratio > 20  Likely prerenal/ATN etiology given poor oral intake and septic shock.    Plan:  Discontinue LR to 75 mL/hour  Monitor I's and O's  Daily BMP  Avoid nephrotoxic/hypotensive agents

## 2025-04-03 NOTE — ASSESSMENT & PLAN NOTE
Assessment:  Presented with septic shock secondary to pneumonia given leukocytosis and elevated lactate.  Bandemia.  Remains afebrile  Negative MRSA.  Negative urine culture.  Negative pleural fluid culture.  Trending down procalcitonin.  Positive bronchial culture also for alphahemolytic Streptococcus  3/31- Patient had low blood pressure with systolic in the 80s and diastolic in the 50s after his bronchoscopy.  He received albumin and 1 L Isolyte with no response.  He eventually was placed on Levophed drip.     Plan:  Patient is off vasopressors.  Follow-up final blood cultures.  Continue antibiotics as below.

## 2025-04-03 NOTE — PROGRESS NOTES
Progress Note - Critical Care/ICU   Name: Den Warren 84 y.o. male I MRN: [id number]  Unit/Bed#: ICU 04 I Date of Admission: 3/30/2025   Date of Service: 4/3/2025 I Hospital Day: 3      Assessment & Plan  Acute hypoxemic and hypercarbic respiratory failure (HCC)  Assessment:  Patient presented with progressive worsening of shortness of breath for 1 week from [facility].   In the ED, he was requiring BiPAP for increased work of breathing and hypoxemia.  Received ceftriaxone and azithromycin.  CT chest:  Large loculated right pleural effusion with dense consolidations within the RML and RLL.   Occlusion of the right bronchus intermedius.  Given continuous BiPAP requirement patient was admitted to the ICU.  Initial VBG was  7.30/60/30/31  3/31- given increased work of breathing on BiPAP a decision was made to intubate.  Patient was agreeable as well as his POA nephew.  SBT on 4/2 with high RSBI on pressure support 6/6    Plan:  Plan SBT today   Fentanyl as needed for sedation.  Xopenex/Atrovent  Airway clearance protocol.  Tube feeds   Continue antibiotics as below.  Septic shock (HCC)  Assessment:  Presented with septic shock secondary to pneumonia given leukocytosis and elevated lactate.  Bandemia.  Remains afebrile  Negative MRSA.  Negative urine culture.  Negative pleural fluid culture.  Trending down procalcitonin.  Positive bronchial culture also for alphahemolytic Streptococcus  3/31- Patient had low blood pressure with systolic in the 80s and diastolic in the 50s after his bronchoscopy.  He received albumin and 1 L Isolyte with no response.  He eventually was placed on Levophed drip.     Plan:  Patient is off vasopressors.  Follow-up final blood cultures.  Continue antibiotics as below.  Pneumonia  Patient has pneumonia secondary to aspiration with possible loculated parapneumonic effusion.  3/31- chest tube was placed and bronchoscopy for mucous plugging at bedside.  Pleural fluid is positive  for lights criteria indicating exudative.  pH 7.4  Negative strep and Legionella.  Negative AFB  Positive bronchial culture also for alphahemolytic Streptococcus    Plan:  Recommend de-escalating to ceftriaxone.  Antibiotics day #4  tPA ordered for chest tube.  Follow-up final bronchial cultures.  Trend fever curve.  CBC daily.    Acute kidney injury superimposed on stage 2 chronic kidney disease (HCC)  Lab Results   Component Value Date    EGFR 32 04/03/2025    EGFR 33 04/02/2025    EGFR 30 04/01/2025    CREATININE 1.86 (H) 04/03/2025    CREATININE 1.80 (H) 04/02/2025    CREATININE 1.93 (H) 04/01/2025     Baseline creatinine (0.9-1.2)  PTA creatinine 1.92  BUN/creatinine ratio > 20  Likely prerenal/ATN etiology given poor oral intake and septic shock.    Plan:  Discontinue LR to 75 mL/hour  Monitor I's and O's  Daily BMP  Avoid nephrotoxic/hypotensive agents  (HFpEF) heart failure with preserved ejection fraction (HCC)  Wt Readings from Last 3 Encounters:   04/03/25 78.3 kg (172 lb 9.9 oz)   02/21/24 77.6 kg (171 lb)   01/03/24 77.6 kg (171 lb)     Echocardiogram this admission shows EF 73%.  Normal diastolic function.  Mild aortic stenosis.  Consider restarting Home torsemide with blood pressure parameters.  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HCC)  Continue home Sinemet  3 times a day through OG tube.   Pulmonary emphysema (HCC)  Last PFT 2020: Moderate obstructive lung disease with hyperinflation and low DLCO.  Home inhalers include Pulmicort, Spiriva, and albuterol.    GERD (gastroesophageal reflux disease)  Continue home Protonix  Depression  Continue OP Mirtazapine 15 mg daily at bedtime.  Continue home Lexapro.  Anemia  Hemoglobin dropping from 13.3 on admission to 9.2  Last hemoglobin in 6/2024 was 10.7.  No signs of bleeding.  The initial reading was likely secondary to dehydration.     Ambulatory dysfunction  Fall precautions  Consult PT and OT  Disposition: Critical care    ICU Core Measures     Vented  Patient  VAP Bundle  VAP bundle ordered     A: Assess, Prevent, and Manage Pain Has pain been assessed? Yes  Need for changes to pain regimen? No   B: Both Spontaneous Awakening Trials (SATs) and Spontaneous Breathing Trials (SBTs) Plan to perform spontaneous awakening trial today? Yes   Plan to perform spontaneous breathing trial today? Yes   Obvious barriers to extubation? No   C: Choice of Sedation RASS Goal: -1 Drowsy  Need for changes to sedation or analgesia regimen? No   D: Delirium CAM-ICU: Negative   E: Early Mobility  Plan for early mobility? No   F: Family Engagement Plan for family engagement today? Yes       Antibiotic Review: Patient on appropriate coverage based on culture data.     Review of Invasive Devices:    Enmanuel Plan: Continue for accurate I/O monitoring for 48 hours        Prophylaxis:  VTE VTE covered by:  heparin (porcine), Subcutaneous, 5,000 Units at 04/03/25 0549       Stress Ulcer  covered byomeprazole (PRILOSEC) suspension 2 mg/mL [[id number]], omeprazole (PriLOSEC) 20 mg delayed release capsule [[id number]] (Long-Term Med)         24 Hour Events :   Nothing overnight.     Subjective    Patient was seen and examined this morning.  He is intubated.  He was not sedated and was able to follow commands and answer my questions with moving his thumb.  He denied pain.      Objective :                   Vitals I/O      Most Recent Min/Max in 24hrs   Temp 99.4 °F (37.4 °C) Temp  Min: 98.5 °F (36.9 °C)  Max: 101.9 °F (38.8 °C)   Pulse 80 Pulse  Min: 79  Max: 106   Resp (!) 30 Resp  Min: 20  Max: 34   /54 BP  Min: 87/51  Max: 127/56   O2 Sat 97 % SpO2  Min: 95 %  Max: 97 %      Intake/Output Summary (Last 24 hours) at 4/3/2025 0635  Last data filed at 4/3/2025 0600  Gross per 24 hour   Intake 2649.5 ml   Output 2445 ml   Net 204.5 ml       Diet Enteral/Parenteral; Tube Feeding No Oral Diet; Jevity 1.2 Ajay; Continuous; 65; 10; Water; Every 6 hours    Invasive Monitoring   No invasive Monitoring          Physical Exam   Physical Exam  Eyes:      Extraocular Movements: Extraocular movements intact.      Pupils: Pupils are equal, round, and reactive to light.   HENT:      Head: Normocephalic and atraumatic.      Right Ear: Hearing and tympanic membrane normal.      Nose: No congestion.      Mouth/Throat:      Mouth: Mucous membranes are moist.   Cardiovascular:      Rate and Rhythm: Normal rate and regular rhythm.   Musculoskeletal:      Right lower leg: No edema.      Left lower leg: No edema.      Comments: Swollen left arm   Abdominal:      Palpations: Abdomen is soft.      Tenderness: There is no abdominal tenderness.   Constitutional:       Appearance: He is well-developed. He is ill-appearing.      Interventions: He is intubated.   Pulmonary:      Effort: Tachypnea present. He is intubated.   Neurological:      Mental Status: He is somnolent.   Genitourinary/Anorectal:  Singer present.        Diagnostic Studies        Lab Results: I have reviewed the following results:     Medications:  Scheduled PRN   alteplase (CATHFLO) 10 mg in sterile water 30 mL instillation, 10 mg, BID   And  dornase stone (PULMOZYME) 5 mg in sterile water 30 mL instillation, 5 mg, BID  budesonide, 0.25 mg, BID  carbidopa-levodopa, 1 tablet, TID  cefepime, 2,000 mg, Q12H  chlorhexidine, 15 mL, Q12H LINDA  ergocalciferol, 50,000 Units, Weekly  escitalopram, 5 mg, Daily  heparin (porcine), 5,000 Units, Q8H LINDA  ipratropium, 0.5 mg, TID  levalbuterol, 1.25 mg, TID  melatonin, 3 mg, HS  mirtazapine, 15 mg, HS  omeprazole (PRILOSEC) suspension 2 mg/mL, 20 mg, Daily  rOPINIRole, 0.5 mg, HS  senna-docusate sodium, 1 tablet, HS  [Held by provider] torsemide, 20 mg, Daily      acetaminophen, 975 mg, Q6H PRN  fentaNYL, 50 mcg, Q4H PRN  vancomycin, 10 mg/kg, Daily PRN       Continuous    lactated ringers, 75 mL/hr, Last Rate: 75 mL/hr (04/03/25 0342)         Labs:   CBC    Recent Labs     04/02/25  0432 04/03/25  0511   WBC 34.53* 27.09*   HGB  9.4* 9.3*   HCT 28.8* 27.8*   * 363     BMP    Recent Labs     04/02/25  0432 04/03/25  0511   SODIUM 135 137   K 4.8 4.5    103   CO2 28 28   AGAP 7 6   BUN 52* 58*   CREATININE 1.80* 1.86*   CALCIUM 8.3* 8.1*       Coags    No recent results     Additional Electrolytes  Recent Labs     04/02/25 0432 04/03/25  0511   MG 2.2 2.5   PHOS  --  4.1   CAIONIZED 1.06* 1.12          Blood Gas    No recent results  No recent results LFTs  No recent results    Infectious  Recent Labs     04/02/25  0432   PROCALCITONI 12.49*     Glucose  Recent Labs     04/02/25  0432 04/03/25  0511   GLUC 119 159*

## 2025-04-03 NOTE — ASSESSMENT & PLAN NOTE
Patient has pneumonia secondary to aspiration with possible loculated parapneumonic effusion.  3/31- chest tube was placed and bronchoscopy for mucous plugging at bedside.  Pleural fluid is positive for lights criteria indicating exudative.  pH 7.4  Negative strep and Legionella.  Negative AFB  Positive bronchial culture also for alphahemolytic Streptococcus    Plan:  Recommend de-escalating to ceftriaxone.  Antibiotics day #4  tPA ordered for chest tube.  Follow-up final bronchial cultures.  Trend fever curve.  CBC daily.

## 2025-04-04 ENCOUNTER — APPOINTMENT (INPATIENT)
Dept: RADIOLOGY | Facility: HOSPITAL | Age: 85
DRG: 870 | End: 2025-04-04
Payer: MEDICARE

## 2025-04-04 LAB
ANION GAP SERPL CALCULATED.3IONS-SCNC: 5 MMOL/L (ref 4–13)
BASOPHILS # BLD AUTO: 0.07 THOUSANDS/ÂΜL (ref 0–0.1)
BASOPHILS NFR BLD AUTO: 0 % (ref 0–1)
BUN SERPL-MCNC: 55 MG/DL (ref 5–25)
CALCIUM SERPL-MCNC: 8.1 MG/DL (ref 8.4–10.2)
CHLORIDE SERPL-SCNC: 105 MMOL/L (ref 96–108)
CO2 SERPL-SCNC: 29 MMOL/L (ref 21–32)
CREAT SERPL-MCNC: 1.79 MG/DL (ref 0.6–1.3)
EOSINOPHIL # BLD AUTO: 0.01 THOUSAND/ÂΜL (ref 0–0.61)
EOSINOPHIL NFR BLD AUTO: 0 % (ref 0–6)
ERYTHROCYTE [DISTWIDTH] IN BLOOD BY AUTOMATED COUNT: 14.3 % (ref 11.6–15.1)
GFR SERPL CREATININE-BSD FRML MDRD: 33 ML/MIN/1.73SQ M
GLUCOSE SERPL-MCNC: 156 MG/DL (ref 65–140)
GLUCOSE SERPL-MCNC: 175 MG/DL (ref 65–140)
HCT VFR BLD AUTO: 30.7 % (ref 36.5–49.3)
HGB BLD-MCNC: 9.9 G/DL (ref 12–17)
IMM GRANULOCYTES # BLD AUTO: >0.5 THOUSAND/UL (ref 0–0.2)
IMM GRANULOCYTES NFR BLD AUTO: 2 % (ref 0–2)
LYMPHOCYTES # BLD AUTO: 0.67 THOUSANDS/ÂΜL (ref 0.6–4.47)
LYMPHOCYTES NFR BLD AUTO: 2 % (ref 14–44)
MCH RBC QN AUTO: 27.2 PG (ref 26.8–34.3)
MCHC RBC AUTO-ENTMCNC: 32.2 G/DL (ref 31.4–37.4)
MCV RBC AUTO: 84 FL (ref 82–98)
MONOCYTES # BLD AUTO: 1.91 THOUSAND/ÂΜL (ref 0.17–1.22)
MONOCYTES NFR BLD AUTO: 6 % (ref 4–12)
NEUTROPHILS # BLD AUTO: 26.62 THOUSANDS/ÂΜL (ref 1.85–7.62)
NEUTS SEG NFR BLD AUTO: 90 % (ref 43–75)
NRBC BLD AUTO-RTO: 0 /100 WBCS
PLATELET # BLD AUTO: 401 THOUSANDS/UL (ref 149–390)
PMV BLD AUTO: 9.9 FL (ref 8.9–12.7)
POTASSIUM SERPL-SCNC: 4.8 MMOL/L (ref 3.5–5.3)
RBC # BLD AUTO: 3.64 MILLION/UL (ref 3.88–5.62)
SCAN RESULT: NORMAL
SODIUM SERPL-SCNC: 139 MMOL/L (ref 135–147)
WBC # BLD AUTO: 29.97 THOUSAND/UL (ref 4.31–10.16)

## 2025-04-04 PROCEDURE — 94003 VENT MGMT INPAT SUBQ DAY: CPT

## 2025-04-04 PROCEDURE — 99291 CRITICAL CARE FIRST HOUR: CPT | Performed by: EMERGENCY MEDICINE

## 2025-04-04 PROCEDURE — 71045 X-RAY EXAM CHEST 1 VIEW: CPT

## 2025-04-04 PROCEDURE — 94640 AIRWAY INHALATION TREATMENT: CPT

## 2025-04-04 PROCEDURE — 94150 VITAL CAPACITY TEST: CPT

## 2025-04-04 PROCEDURE — 3E0L317 INTRODUCTION OF OTHER THROMBOLYTIC INTO PLEURAL CAVITY, PERCUTANEOUS APPROACH: ICD-10-PCS | Performed by: EMERGENCY MEDICINE

## 2025-04-04 PROCEDURE — 80048 BASIC METABOLIC PNL TOTAL CA: CPT

## 2025-04-04 PROCEDURE — 85025 COMPLETE CBC W/AUTO DIFF WBC: CPT

## 2025-04-04 PROCEDURE — 82948 REAGENT STRIP/BLOOD GLUCOSE: CPT

## 2025-04-04 PROCEDURE — 94760 N-INVAS EAR/PLS OXIMETRY 1: CPT

## 2025-04-04 RX ORDER — POLYETHYLENE GLYCOL 3350 17 G/17G
17 POWDER, FOR SOLUTION ORAL DAILY
Status: DISCONTINUED | OUTPATIENT
Start: 2025-04-04 | End: 2025-04-06

## 2025-04-04 RX ADMIN — CARBIDOPA AND LEVODOPA 1 TABLET: 25; 100 TABLET ORAL at 21:17

## 2025-04-04 RX ADMIN — FENTANYL CITRATE 50 MCG: 50 INJECTION INTRAMUSCULAR; INTRAVENOUS at 14:41

## 2025-04-04 RX ADMIN — ESCITALOPRAM OXALATE 5 MG: 10 TABLET ORAL at 08:17

## 2025-04-04 RX ADMIN — HEPARIN SODIUM 5000 UNITS: 5000 INJECTION INTRAVENOUS; SUBCUTANEOUS at 21:14

## 2025-04-04 RX ADMIN — POLYETHYLENE GLYCOL 3350 17 G: 17 POWDER, FOR SOLUTION ORAL at 10:19

## 2025-04-04 RX ADMIN — DORNASE ALFA 5 MG: 1 SOLUTION RESPIRATORY (INHALATION) at 00:28

## 2025-04-04 RX ADMIN — Medication 20 MG: at 08:17

## 2025-04-04 RX ADMIN — HEPARIN SODIUM 5000 UNITS: 5000 INJECTION INTRAVENOUS; SUBCUTANEOUS at 13:59

## 2025-04-04 RX ADMIN — CHLORHEXIDINE GLUCONATE 0.12% ORAL RINSE 15 ML: 1.2 LIQUID ORAL at 08:17

## 2025-04-04 RX ADMIN — CEFTRIAXONE 2000 MG: 10 INJECTION, POWDER, FOR SOLUTION INTRAVENOUS at 17:04

## 2025-04-04 RX ADMIN — LEVALBUTEROL HYDROCHLORIDE 1.25 MG: 1.25 SOLUTION RESPIRATORY (INHALATION) at 20:01

## 2025-04-04 RX ADMIN — ROPINIROLE HYDROCHLORIDE 0.5 MG: 0.25 TABLET, FILM COATED ORAL at 21:14

## 2025-04-04 RX ADMIN — IPRATROPIUM BROMIDE 0.5 MG: 0.5 SOLUTION RESPIRATORY (INHALATION) at 07:53

## 2025-04-04 RX ADMIN — IPRATROPIUM BROMIDE 0.5 MG: 0.5 SOLUTION RESPIRATORY (INHALATION) at 20:01

## 2025-04-04 RX ADMIN — BUDESONIDE 0.25 MG: 0.25 INHALANT RESPIRATORY (INHALATION) at 20:01

## 2025-04-04 RX ADMIN — SENNOSIDES AND DOCUSATE SODIUM 1 TABLET: 50; 8.6 TABLET ORAL at 21:14

## 2025-04-04 RX ADMIN — MIRTAZAPINE 15 MG: 15 TABLET, FILM COATED ORAL at 21:14

## 2025-04-04 RX ADMIN — CHLORHEXIDINE GLUCONATE 0.12% ORAL RINSE 15 ML: 1.2 LIQUID ORAL at 21:14

## 2025-04-04 RX ADMIN — ACETAMINOPHEN 975 MG: 650 SUSPENSION ORAL at 08:43

## 2025-04-04 RX ADMIN — CARBIDOPA AND LEVODOPA 1 TABLET: 25; 100 TABLET ORAL at 08:17

## 2025-04-04 RX ADMIN — LEVALBUTEROL HYDROCHLORIDE 1.25 MG: 1.25 SOLUTION RESPIRATORY (INHALATION) at 14:10

## 2025-04-04 RX ADMIN — BUDESONIDE 0.25 MG: 0.25 INHALANT RESPIRATORY (INHALATION) at 07:53

## 2025-04-04 RX ADMIN — LEVALBUTEROL HYDROCHLORIDE 1.25 MG: 1.25 SOLUTION RESPIRATORY (INHALATION) at 07:53

## 2025-04-04 RX ADMIN — FENTANYL CITRATE 50 MCG: 50 INJECTION INTRAMUSCULAR; INTRAVENOUS at 05:34

## 2025-04-04 RX ADMIN — IPRATROPIUM BROMIDE 0.5 MG: 0.5 SOLUTION RESPIRATORY (INHALATION) at 14:10

## 2025-04-04 RX ADMIN — ALTEPLASE 10 MG: 2.2 INJECTION, POWDER, LYOPHILIZED, FOR SOLUTION INTRAVENOUS at 00:28

## 2025-04-04 RX ADMIN — HEPARIN SODIUM 5000 UNITS: 5000 INJECTION INTRAVENOUS; SUBCUTANEOUS at 05:34

## 2025-04-04 RX ADMIN — CARBIDOPA AND LEVODOPA 1 TABLET: 25; 100 TABLET ORAL at 15:42

## 2025-04-04 RX ADMIN — MELATONIN 3 MG: 3 TAB ORAL at 21:14

## 2025-04-04 NOTE — ASSESSMENT & PLAN NOTE
Assessment:  Presented with septic shock secondary to pneumonia given leukocytosis and elevated lactate.  Bandemia.  Remains afebrile  Negative MRSA.  Negative urine culture.  Negative pleural fluid culture.  Trending down procalcitonin.  Positive bronchial culture also for group B beta-hemolytic Streptococcus sensitive to penicillin.  3/31- Patient had low blood pressure with systolic in the 80s and diastolic in the 50s after his bronchoscopy.  He received albumin and 1 L Isolyte with no response.  He eventually was placed on Levophed drip.     Plan:  Patient is off vasopressors.  Follow-up final blood cultures.  Continue antibiotics as below.

## 2025-04-04 NOTE — CASE MANAGEMENT
Case Management Discharge Planning Note    Patient name Den Warren  Location ICU 04/ICU 04 MRN [id number]  : 1940 Date 2025       Current Admission Date: 3/30/2025  Current Admission Diagnosis:(HFpEF) heart failure with preserved ejection fraction (HCC)   Patient Active Problem List    Diagnosis Date Noted Date Diagnosed    Septic shock (HCC) 2025     Anemia 2025     Acute kidney injury superimposed on stage 2 chronic kidney disease (HCC) 2025     Pleural effusion 2025     Acute hypoxemic and hypercarbic respiratory failure (HCC) 2025     Pneumonia 2025     GERD (gastroesophageal reflux disease) 2025     (HFpEF) heart failure with preserved ejection fraction (HCC) 2025     Lactate blood increase 2025     Pulmonary emphysema, unspecified emphysema type ([facility]) 2024     Other constipation 10/25/2023     Nausea 2023     Lung nodule 2023 06/15/2023    Grade I diastolic dysfunction 2023     Venous insufficiency (chronic) (peripheral) 10/19/2022     Peripheral edema 10/14/2022     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[facility]) 2022     Depression 2022     Ambulatory dysfunction 2022     Acute exacerbation of chronic obstructive pulmonary disease (COPD) ([facility]) 2022     Hemorrhoids 2022     BPH (benign prostatic hyperplasia) 2022     Anxiety 2022     Pulmonary emphysema (HCC) 2022     Anal sphincter incompetence 2022     Former smoker 2020       LOS (days): 4  Geometric Mean LOS (GMLOS) (days): 4.9  Days to GMLOS:0.3     OBJECTIVE:  Risk of Unplanned Readmission Score: 17.38         Current admission status: Inpatient   Preferred Pharmacy:   [facility]  - , [place] - [address]  [address]  [place] [id number]  Phone: [phone] Fax: [phone]    Primary Care Provider: No primary care provider on file.    Primary Insurance: MEDICARE  Secondary  Insurance: [employer]    DISCHARGE DETAILS:     Additional Comments: Patient remains intubated and is not medically ready to return to his LTC facility. Patient is scheduled for SBT today. CM will continue to follow for discharge planning.

## 2025-04-04 NOTE — ASSESSMENT & PLAN NOTE
Wt Readings from Last 3 Encounters:   04/04/25 78.7 kg (173 lb 8 oz)   02/21/24 77.6 kg (171 lb)   01/03/24 77.6 kg (171 lb)     Echocardiogram this admission shows EF 73%.  Normal diastolic function.  Mild aortic stenosis.  Consider restarting Home torsemide with blood pressure parameters.

## 2025-04-04 NOTE — PLAN OF CARE
Problem: Prexisting or High Potential for Compromised Skin Integrity  Goal: Skin integrity is maintained or improved  Description: INTERVENTIONS:- Identify patients at risk for skin breakdown- Assess and monitor skin integrity- Assess and monitor nutrition and hydration status- Monitor labs - Assess for incontinence - Turn and reposition patient- Assist with mobility/ambulation- Relieve pressure over bony prominences- Avoid friction and shearing- Provide appropriate hygiene as needed including keeping skin clean and dry- Evaluate need for skin moisturizer/barrier cream- Collaborate with interdisciplinary team - Patient/family teaching- Consider wound care consult   Outcome: Progressing     Problem: SAFETY ADULT  Goal: Patient will remain free of falls  Description: INTERVENTIONS:- Educate patient/family on patient safety including physical limitations- Instruct patient to call for assistance with activity - Consult OT/PT to assist with strengthening/mobility - Keep Call bell within reach- Keep bed low and locked with side rails adjusted as appropriate- Keep care items and personal belongings within reach- Initiate and maintain comfort rounds- Make Fall Risk Sign visible to staff-  Apply yellow socks and bracelet for high fall risk patients- Consider moving patient to room near nurses station  INTERVENTIONS:- Educate patient/family on patient safety including physical limitations- Instruct patient to call for assistance with activity - Consult OT/PT to assist with strengthening/mobility - Keep Call bell within reach- Keep bed low and locked with side rails adjusted as appropriate- Keep care items and personal belongings within reach- Initiate and maintain comfort rounds- Make Fall Risk Sign visible to staff- Apply yellow socks and bracelet for high fall risk patients- Consider moving patient to room near nurses station  Outcome: Progressing  Goal: Maintain or return to baseline ADL function  Description:  INTERVENTIONS:-  Assess patient's ability to carry out ADLs; assess patient's baseline for ADL function and identify physical deficits which impact ability to perform ADLs (bathing, care of mouth/teeth, toileting, grooming, dressing, etc.)- Assess/evaluate cause of self-care deficits - Assess range of motion- Assess patient's mobility; develop plan if impaired- Assess patient's need for assistive devices and provide as appropriate- Encourage maximum independence but intervene and supervise when necessary- Involve family in performance of ADLs- Assess for home care needs following discharge - Consider OT consult to assist with ADL evaluation and planning for discharge- Provide patient education as appropriate  INTERVENTIONS:-  Assess patient's ability to carry out ADLs; assess patient's baseline for ADL function and identify physical deficits which impact ability to perform ADLs (bathing, care of mouth/teeth, toileting, grooming, dressing, etc.)- Assess/evaluate cause of self-care deficits - Assess range of motion- Assess patient's mobility; develop plan if impaired- Assess patient's need for assistive devices and provide as appropriate- Encourage maximum independence but intervene and supervise when necessary- Involve family in performance of ADLs- Assess for home care needs following discharge - Consider OT consult to assist with ADL evaluation and planning for discharge- Provide patient education as appropriate  Outcome: Progressing  Goal: Maintains/Returns to pre admission functional level  Description: INTERVENTIONS:- Perform AM-PAC 6 Click Basic Mobility/ Daily Activity assessment daily.- Set and communicate daily mobility goal to care team and patient/family/caregiver. - Collaborate with rehabilitation services on mobility goals if consulted- Out of bed for toileting- Record patient progress and toleration of activity level   INTERVENTIONS:- Perform AM-PAC 6 Click Basic Mobility/ Daily Activity assessment  daily.- Set and communicate daily mobility goal to care team and patient/family/caregiver. - Collaborate with rehabilitation services on mobility goals if consulted- Out of bed for toileting- Record patient progress and toleration of activity level   Outcome: Progressing     Problem: DISCHARGE PLANNING  Goal: Discharge to home or other facility with appropriate resources  Description: INTERVENTIONS:- Identify barriers to discharge w/patient and caregiver- Arrange for needed discharge resources and transportation as appropriate- Identify discharge learning needs (meds, wound care, etc.)- Arrange for interpretive services to assist at discharge as needed- Refer to Case Management Department for coordinating discharge planning if the patient needs post-hospital services based on physician/advanced practitioner order or complex needs related to functional status, cognitive ability, or social support system  INTERVENTIONS:- Identify barriers to discharge w/patient and caregiver- Arrange for needed discharge resources and transportation as appropriate- Identify discharge learning needs (meds, wound care, etc.)- Arrange for interpretive services to assist at discharge as needed- Refer to Case Management Department for coordinating discharge planning if the patient needs post-hospital services based on physician/advanced practitioner order or complex needs related to functional status, cognitive ability, or social support system  Outcome: Progressing     Problem: Knowledge Deficit  Goal: Patient/family/caregiver demonstrates understanding of disease process, treatment plan, medications, and discharge instructions  Description: Complete learning assessment and assess knowledge base.Interventions:- Provide teaching at level of understanding- Provide teaching via preferred learning methods  Complete learning assessment and assess knowledge base.Interventions:- Provide teaching at level of understanding- Provide teaching via  preferred learning methods  Outcome: Progressing     Problem: PAIN - ADULT  Goal: Verbalizes/displays adequate comfort level or baseline comfort level  Description: Interventions:- Encourage patient to monitor pain and request assistance- Assess pain using appropriate pain scale- Administer analgesics based on type and severity of pain and evaluate response- Implement non-pharmacological measures as appropriate and evaluate response- Consider cultural and social influences on pain and pain management- Notify physician/advanced practitioner if interventions unsuccessful or patient reports new pain  Outcome: Progressing     Problem: INFECTION - ADULT  Goal: Absence or prevention of progression during hospitalization  Description: INTERVENTIONS:- Assess and monitor for signs and symptoms of infection- Monitor lab/diagnostic results- Monitor all insertion sites, i.e. indwelling lines, tubes, and drains- Monitor endotracheal if appropriate and nasal secretions for changes in amount and color- Colbert appropriate cooling/warming therapies per order- Administer medications as ordered- Instruct and encourage patient and family to use good hand hygiene technique- Identify and instruct in appropriate isolation precautions for identified infection/condition  Outcome: Progressing     Problem: RESPIRATORY - ADULT  Goal: Achieves optimal ventilation and oxygenation  Description: INTERVENTIONS:- Assess for changes in respiratory status- Assess for changes in mentation and behavior- Position to facilitate oxygenation and minimize respiratory effort- Oxygen administered by appropriate delivery if ordered- Initiate smoking cessation education as indicated- Encourage broncho-pulmonary hygiene including cough, deep breathe, Incentive Spirometry- Assess the need for suctioning and aspirate as needed- Assess and instruct to report SOB or any respiratory difficulty- Respiratory Therapy support as indicated  Outcome: Progressing      Problem: COPING  Goal: Pt/Family able to verbalize concerns and demonstrate effective coping strategies  Description: INTERVENTIONS:- Assist patient/family to identify coping skills, available support systems and cultural and spiritual values- Provide emotional support, including active listening and acknowledgement of concerns of patient and caregivers- Reduce environmental stimuli, as able- Provide patient education- Assess for spiritual pain/suffering and initiate spiritual care, including notification of Pastoral Care or randal based community as needed- Assess effectiveness of coping strategies  Outcome: Progressing  Goal: Will report anxiety at manageable levels  Description: INTERVENTIONS:- Administer medication as ordered- Teach and encourage coping skills- Provide emotional support- Assess patient/family for anxiety and ability to cope  Outcome: Progressing     Problem: Nutrition/Hydration-ADULT  Goal: Nutrient/Hydration intake appropriate for improving, restoring or maintaining nutritional needs  Description: Monitor and assess patient's nutrition/hydration status for malnutrition. Collaborate with interdisciplinary team and initiate plan and interventions as ordered.  Monitor patient's weight and dietary intake as ordered or per policy. Utilize nutrition screening tool and intervene as necessary. Determine patient's food preferences and provide high-protein, high-caloric foods as appropriate. INTERVENTIONS:- Monitor oral intake, urinary output, labs, and treatment plans- Assess nutrition and hydration status and recommend course of action- Evaluate amount of meals eaten- Assist patient with eating if necessary - Allow adequate time for meals- Recommend/ encourage appropriate diets, oral nutritional supplements, and vitamin/mineral supplements- Order, calculate, and assess calorie counts as needed- Recommend, monitor, and adjust tube feedings and TPN/PPN based on assessed needs- Assess need for  intravenous fluids- Provide specific nutrition/hydration education as appropriate- Include patient/family/caregiver in decisions related to nutrition  Outcome: Progressing

## 2025-04-04 NOTE — QUICK NOTE
04/04/25     Intrapleural Instillation of tPA/DNase - Dose #6/6     Under sterile conditions, Right chest tube was flushed with 10 ml of 0.9% saline to confirm tube patency. 10mg tPA (30mL) and 5mg dornase (30mL) instilled via tube at 0030. The patient tolerated without complaints. Plan to unclamp at 0100 and return to wall suction. D/W RN. The medications were scanned into medical record by RN.     For any concerns or for any change in patient condition, contact Critical Care Team via [employer] Secure Chat.     JUVE Proctor

## 2025-04-04 NOTE — ASSESSMENT & PLAN NOTE
Lab Results   Component Value Date    EGFR 33 04/04/2025    EGFR 32 04/03/2025    EGFR 33 04/02/2025    CREATININE 1.79 (H) 04/04/2025    CREATININE 1.86 (H) 04/03/2025    CREATININE 1.80 (H) 04/02/2025     Baseline creatinine (0.9-1.2)  PTA creatinine 1.92  BUN/creatinine ratio > 20  Likely prerenal/ATN etiology given poor oral intake and septic shock.    Plan:  Discontinue LR to 75 mL/hour  Continue to monitor I's and O's  Daily BMP  Avoid nephrotoxic/hypotensive agents

## 2025-04-04 NOTE — PLAN OF CARE
Problem: Prexisting or High Potential for Compromised Skin Integrity  Goal: Skin integrity is maintained or improved  Description: INTERVENTIONS:- Identify patients at risk for skin breakdown- Assess and monitor skin integrity- Assess and monitor nutrition and hydration status- Monitor labs - Assess for incontinence - Turn and reposition patient- Assist with mobility/ambulation- Relieve pressure over bony prominences- Avoid friction and shearing- Provide appropriate hygiene as needed including keeping skin clean and dry- Evaluate need for skin moisturizer/barrier cream- Collaborate with interdisciplinary team - Patient/family teaching- Consider wound care consult   Outcome: Progressing     Problem: SAFETY ADULT  Goal: Patient will remain free of falls  Description: INTERVENTIONS:- Educate patient/family on patient safety including physical limitations- Instruct patient to call for assistance with activity - Consult OT/PT to assist with strengthening/mobility - Keep Call bell within reach- Keep bed low and locked with side rails adjusted as appropriate- Keep care items and personal belongings within reach- Initiate and maintain comfort rounds- Make Fall Risk Sign visible to staff-  Apply yellow socks and bracelet for high fall risk patients- Consider moving patient to room near nurses station  INTERVENTIONS:- Educate patient/family on patient safety including physical limitations- Instruct patient to call for assistance with activity - Consult OT/PT to assist with strengthening/mobility - Keep Call bell within reach- Keep bed low and locked with side rails adjusted as appropriate- Keep care items and personal belongings within reach- Initiate and maintain comfort rounds- Make Fall Risk Sign visible to staff- Apply yellow socks and bracelet for high fall risk patients- Consider moving patient to room near nurses station  Outcome: Progressing     Problem: PAIN - ADULT  Goal: Verbalizes/displays adequate comfort level  or baseline comfort level  Description: Interventions:- Encourage patient to monitor pain and request assistance- Assess pain using appropriate pain scale- Administer analgesics based on type and severity of pain and evaluate response- Implement non-pharmacological measures as appropriate and evaluate response- Consider cultural and social influences on pain and pain management- Notify physician/advanced practitioner if interventions unsuccessful or patient reports new pain  Outcome: Progressing     Problem: INFECTION - ADULT  Goal: Absence or prevention of progression during hospitalization  Description: INTERVENTIONS:- Assess and monitor for signs and symptoms of infection- Monitor lab/diagnostic results- Monitor all insertion sites, i.e. indwelling lines, tubes, and drains- Monitor endotracheal if appropriate and nasal secretions for changes in amount and color- Cameron appropriate cooling/warming therapies per order- Administer medications as ordered- Instruct and encourage patient and family to use good hand hygiene technique- Identify and instruct in appropriate isolation precautions for identified infection/condition  Outcome: Progressing     Problem: RESPIRATORY - ADULT  Goal: Achieves optimal ventilation and oxygenation  Description: INTERVENTIONS:- Assess for changes in respiratory status- Assess for changes in mentation and behavior- Position to facilitate oxygenation and minimize respiratory effort- Oxygen administered by appropriate delivery if ordered- Initiate smoking cessation education as indicated- Encourage broncho-pulmonary hygiene including cough, deep breathe, Incentive Spirometry- Assess the need for suctioning and aspirate as needed- Assess and instruct to report SOB or any respiratory difficulty- Respiratory Therapy support as indicated  Outcome: Progressing     Problem: Nutrition/Hydration-ADULT  Goal: Nutrient/Hydration intake appropriate for improving, restoring or maintaining  nutritional needs  Description: Monitor and assess patient's nutrition/hydration status for malnutrition. Collaborate with interdisciplinary team and initiate plan and interventions as ordered.  Monitor patient's weight and dietary intake as ordered or per policy. Utilize nutrition screening tool and intervene as necessary. Determine patient's food preferences and provide high-protein, high-caloric foods as appropriate. INTERVENTIONS:- Monitor oral intake, urinary output, labs, and treatment plans- Assess nutrition and hydration status and recommend course of action- Evaluate amount of meals eaten- Assist patient with eating if necessary - Allow adequate time for meals- Recommend/ encourage appropriate diets, oral nutritional supplements, and vitamin/mineral supplements- Order, calculate, and assess calorie counts as needed- Recommend, monitor, and adjust tube feedings and TPN/PPN based on assessed needs- Assess need for intravenous fluids- Provide specific nutrition/hydration education as appropriate- Include patient/family/caregiver in decisions related to nutrition  Outcome: Progressing     Problem: SAFETY,RESTRAINT: NV/NON-SELF DESTRUCTIVE BEHAVIOR  Goal: Remains free of harm/injury (restraint for non violent/non self-detsructive behavior)  Description: INTERVENTIONS:- Instruct patient/family regarding restraint use - Assess and monitor physiologic and psychological status - Provide interventions and comfort measures to meet assessed patient needs - Identify and implement measures to help patient regain control- Assess readiness for release of restraint   Outcome: Progressing

## 2025-04-04 NOTE — PROGRESS NOTES
Progress Note - Critical Care/ICU   Name: Den Warren 84 y.o. male I MRN: [id number]  Unit/Bed#: ICU 04 I Date of Admission: 3/30/2025   Date of Service: 4/4/2025 I Hospital Day: 4      Assessment & Plan  Acute hypoxemic and hypercarbic respiratory failure (HCC)  Assessment:  Patient presented with progressive worsening of shortness of breath for 1 week from [facility].   In the ED, he was requiring BiPAP for increased work of breathing and hypoxemia.  Received ceftriaxone and azithromycin.  CT chest:  Large loculated right pleural effusion with dense consolidations within the RML and RLL.   Occlusion of the right bronchus intermedius.  Given continuous BiPAP requirement patient was admitted to the ICU.  Initial VBG was  7.30/60/30/31  3/31- given increased work of breathing on BiPAP a decision was made to intubate.  Patient was agreeable as well as his POA nephew.  SBT on 4/2 with high RSBI on pressure support 6/6  4/3-overnight, patient was maintained on pressure control 8/6 however he became tachypneic with tidal volumes of 150-160 and was switched back to volume control.    Plan:  Plan for SBT today   Fentanyl as needed for sedation.  Xopenex/Atrovent  Airway clearance protocol.  Tube feeds   Continue antibiotics as below.  Septic shock (HCC)  Assessment:  Presented with septic shock secondary to pneumonia given leukocytosis and elevated lactate.  Bandemia.  Remains afebrile  Negative MRSA.  Negative urine culture.  Negative pleural fluid culture.  Trending down procalcitonin.  Positive bronchial culture also for group B beta-hemolytic Streptococcus sensitive to penicillin.  3/31- Patient had low blood pressure with systolic in the 80s and diastolic in the 50s after his bronchoscopy.  He received albumin and 1 L Isolyte with no response.  He eventually was placed on Levophed drip.     Plan:  Patient is off vasopressors.  Follow-up final blood cultures.  Continue antibiotics as  below.  Pneumonia  Patient has pneumonia secondary to aspiration with possible loculated parapneumonic effusion.  3/31- chest tube was placed and bronchoscopy for mucous plugging at bedside.  Pleural fluid is positive for lights criteria indicating exudative.  pH 7.4  Negative strep and Legionella.  Negative AFB  Positive bronchial culture also for group B beta-hemolytic Streptococcus sensitive to penicillin.    Plan:  De-escalated to ceftriaxone. Day #2. Anticipate a course of 2 to 4 weeks.  Total antibiotics day #5   Completed tPA and dornase for chest tube.  Trend fever curve.  CBC daily.    Acute kidney injury superimposed on stage 2 chronic kidney disease (HCC)  Lab Results   Component Value Date    EGFR 33 04/04/2025    EGFR 32 04/03/2025    EGFR 33 04/02/2025    CREATININE 1.79 (H) 04/04/2025    CREATININE 1.86 (H) 04/03/2025    CREATININE 1.80 (H) 04/02/2025     Baseline creatinine (0.9-1.2)  PTA creatinine 1.92  BUN/creatinine ratio > 20  Likely prerenal/ATN etiology given poor oral intake and septic shock.    Plan:  Discontinue LR to 75 mL/hour  Continue to monitor I's and O's  Daily BMP  Avoid nephrotoxic/hypotensive agents  (HFpEF) heart failure with preserved ejection fraction (HCC)  Wt Readings from Last 3 Encounters:   04/04/25 78.7 kg (173 lb 8 oz)   02/21/24 77.6 kg (171 lb)   01/03/24 77.6 kg (171 lb)     Echocardiogram this admission shows EF 73%.  Normal diastolic function.  Mild aortic stenosis.  Consider restarting Home torsemide with blood pressure parameters.  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HCC)  Continue home Sinemet  3 times a day through OG tube.   Pulmonary emphysema (HCC)  Last PFT 2020: Moderate obstructive lung disease with hyperinflation and low DLCO.  Home inhalers include Pulmicort, Spiriva, and albuterol.    GERD (gastroesophageal reflux disease)  Continue home Protonix  Depression  Continue OP Mirtazapine 15 mg daily at bedtime.  Continue home Lexapro.  Anemia  Hemoglobin dropping  from 13.3 on admission to 9.2  Last hemoglobin in 6/2024 was 10.7.  No signs of bleeding.  The initial reading was likely secondary to dehydration.     Ambulatory dysfunction  Fall precautions  PT/OT when appropriate.    Disposition: Critical care    ICU Core Measures     Vented Patient  VAP Bundle  VAP bundle ordered     A: Assess, Prevent, and Manage Pain Has pain been assessed? Yes  Need for changes to pain regimen? No   B: Both Spontaneous Awakening Trials (SATs) and Spontaneous Breathing Trials (SBTs) Plan to perform spontaneous awakening trial today? Yes   Plan to perform spontaneous breathing trial today? Yes   Obvious barriers to extubation? Yes   C: Choice of Sedation RASS Goal: -1 Drowsy or 0 Alert and Calm  Need for changes to sedation or analgesia regimen? No   D: Delirium CAM-ICU: Negative   E: Early Mobility  Plan for early mobility? No   F: Family Engagement Plan for family engagement today? Yes       Antibiotic Review: Patient on appropriate coverage based on culture data.     Review of Invasive Devices:    Enmanuel Plan: Continue for accurate I/O monitoring for 48 hours        Prophylaxis:  VTE VTE covered by:  heparin (porcine), Subcutaneous, 5,000 Units at 04/04/25 0534       Stress Ulcer  covered byomeprazole (PRILOSEC) suspension 2 mg/mL [[id number]], omeprazole (PriLOSEC) 20 mg delayed release capsule [[id number]] (Long-Term Med)         24 Hour Events :   Overnight, he was maintained on pressure control 8/6 however became tachypneic with tidal volumes 150-160 and was switched to volume control setting.    Subjective   Patient was seen and examined this morning.  He is intubated but not sedated.  He was able to follow commands.  Denied any pain.    Objective :                   Vitals I/O      Most Recent Min/Max in 24hrs   Temp 98.1 °F (36.7 °C) Temp  Min: 98.1 °F (36.7 °C)  Max: 99.3 °F (37.4 °C)   Pulse 92 Pulse  Min: 81  Max: 100   Resp (!) 28 Resp  Min: 16  Max: 39   /57 BP  Min: 99/55   Max: 139/74   O2 Sat 97 % SpO2  Min: 95 %  Max: 98 %      Intake/Output Summary (Last 24 hours) at 4/4/2025 0705  Last data filed at 4/4/2025 0601  Gross per 24 hour   Intake 1759.25 ml   Output 2595 ml   Net -835.75 ml       Diet Enteral/Parenteral; Tube Feeding No Oral Diet; Jevity 1.2 Ajay; Continuous; 65; Water; Every 6 hours    Invasive Monitoring   No invasive monitoring        Physical Exam   Physical Exam  Skin:     General: Skin is warm.   HENT:      Head: Normocephalic.      Right Ear: Tympanic membrane normal.      Nose: No congestion.      Mouth/Throat:      Mouth: Mucous membranes are moist.   Cardiovascular:      Rate and Rhythm: Normal rate and regular rhythm.   Musculoskeletal:      Right lower leg: No edema.      Left lower leg: No edema.   Abdominal:      Palpations: Abdomen is soft.      Tenderness: There is no abdominal tenderness.   Constitutional:       General: He is not in acute distress.     Appearance: He is overweight. He is ill-appearing.      Interventions: He is intubated.   Pulmonary:      Effort: Pulmonary effort is normal. He is intubated.      Breath sounds: Normal breath sounds.   Neurological:      Mental Status: He is somnolent.   Genitourinary/Anorectal:  Singer present.        Diagnostic Studies        Lab Results: I have reviewed the following results:     Medications:  Scheduled PRN   budesonide, 0.25 mg, BID  carbidopa-levodopa, 1 tablet, TID  cefTRIAXone, 2,000 mg, Q24H  chlorhexidine, 15 mL, Q12H LINDA  escitalopram, 5 mg, Daily  heparin (porcine), 5,000 Units, Q8H LINDA  ipratropium, 0.5 mg, TID  levalbuterol, 1.25 mg, TID  melatonin, 3 mg, HS  mirtazapine, 15 mg, HS  omeprazole (PRILOSEC) suspension 2 mg/mL, 20 mg, Daily  rOPINIRole, 0.5 mg, HS  senna-docusate sodium, 1 tablet, HS  [Held by provider] torsemide, 20 mg, Daily      acetaminophen, 975 mg, Q6H PRN  fentaNYL, 50 mcg, Q4H PRN       Continuous          Labs:   CBC    Recent Labs     04/03/25  0511 04/04/25  0534   WBC  27.09* 29.97*   HGB 9.3* 9.9*   HCT 27.8* 30.7*    401*     BMP    Recent Labs     04/03/25  0511 04/04/25  0534   SODIUM 137 139   K 4.5 4.8    105   CO2 28 29   AGAP 6 5   BUN 58* 55*   CREATININE 1.86* 1.79*   CALCIUM 8.1* 8.1*       Coags    No recent results     Additional Electrolytes  Recent Labs     04/03/25  0511   MG 2.5   PHOS 4.1   CAIONIZED 1.12          Blood Gas    No recent results  No recent results LFTs  No recent results    Infectious  No recent results  Glucose  Recent Labs     04/03/25  0511 04/04/25  0534   GLUC 159* 175*

## 2025-04-04 NOTE — ASSESSMENT & PLAN NOTE
Assessment:  Patient presented with progressive worsening of shortness of breath for 1 week from [facility].   In the ED, he was requiring BiPAP for increased work of breathing and hypoxemia.  Received ceftriaxone and azithromycin.  CT chest:  Large loculated right pleural effusion with dense consolidations within the RML and RLL.   Occlusion of the right bronchus intermedius.  Given continuous BiPAP requirement patient was admitted to the ICU.  Initial VBG was  7.30/60/30/31  3/31- given increased work of breathing on BiPAP a decision was made to intubate.  Patient was agreeable as well as his POA nephew.  SBT on 4/2 with high RSBI on pressure support 6/6  4/3-overnight, patient was maintained on pressure control 8/6 however he became tachypneic with tidal volumes of 150-160 and was switched back to volume control.    Plan:  Plan for SBT today   Fentanyl as needed for sedation.  Xopenex/Atrovent  Airway clearance protocol.  Tube feeds   Continue antibiotics as below.

## 2025-04-05 PROBLEM — A41.9 SEPTIC SHOCK (HCC): Status: RESOLVED | Noted: 2025-04-01 | Resolved: 2025-04-05

## 2025-04-05 PROBLEM — R65.21 SEPTIC SHOCK (HCC): Status: RESOLVED | Noted: 2025-04-01 | Resolved: 2025-04-05

## 2025-04-05 LAB
ANION GAP SERPL CALCULATED.3IONS-SCNC: 5 MMOL/L (ref 4–13)
ANISOCYTOSIS BLD QL SMEAR: PRESENT
ARTERIAL PATENCY WRIST A: YES
BACTERIA BLD CULT: NORMAL
BACTERIA BLD CULT: NORMAL
BACTERIA SPEC BFLD CULT: ABNORMAL
BASE EXCESS BLDA CALC-SCNC: 4.4 MMOL/L
BASOPHILS # BLD MANUAL: 0 THOUSAND/UL (ref 0–0.1)
BASOPHILS NFR MAR MANUAL: 0 % (ref 0–1)
BODY TEMPERATURE: 100.3 DEGREES FEHRENHEIT
BUN SERPL-MCNC: 59 MG/DL (ref 5–25)
CALCIUM SERPL-MCNC: 8.1 MG/DL (ref 8.4–10.2)
CHLORIDE SERPL-SCNC: 106 MMOL/L (ref 96–108)
CO2 SERPL-SCNC: 30 MMOL/L (ref 21–32)
CREAT SERPL-MCNC: 1.81 MG/DL (ref 0.6–1.3)
EOSINOPHIL # BLD MANUAL: 0.27 THOUSAND/UL (ref 0–0.4)
EOSINOPHIL NFR BLD MANUAL: 1 % (ref 0–6)
ERYTHROCYTE [DISTWIDTH] IN BLOOD BY AUTOMATED COUNT: 14.4 % (ref 11.6–15.1)
GFR SERPL CREATININE-BSD FRML MDRD: 33 ML/MIN/1.73SQ M
GLUCOSE SERPL-MCNC: 173 MG/DL (ref 65–140)
GRAM STN SPEC: ABNORMAL
HCO3 BLDA-SCNC: 28.2 MMOL/L (ref 22–28)
HCT VFR BLD AUTO: 30.5 % (ref 36.5–49.3)
HGB BLD-MCNC: 9.6 G/DL (ref 12–17)
HOROWITZ INDEX BLDA+IHG-RTO: 50 MM[HG]
HYPERCHROMIA BLD QL SMEAR: PRESENT
LG PLATELETS BLD QL SMEAR: PRESENT
LYMPHOCYTES # BLD AUTO: 0.82 THOUSAND/UL (ref 0.6–4.47)
LYMPHOCYTES # BLD AUTO: 3 % (ref 14–44)
MCH RBC QN AUTO: 26.7 PG (ref 26.8–34.3)
MCHC RBC AUTO-ENTMCNC: 31.5 G/DL (ref 31.4–37.4)
MCV RBC AUTO: 85 FL (ref 82–98)
MONOCYTES # BLD AUTO: 0.82 THOUSAND/UL (ref 0–1.22)
MONOCYTES NFR BLD: 3 % (ref 4–12)
MYELOCYTE ABSOLUTE CT: 0.27 THOUSAND/UL (ref 0–0.1)
MYELOCYTES NFR BLD MANUAL: 1 % (ref 0–1)
NEUTROPHILS # BLD MANUAL: 25.13 THOUSAND/UL (ref 1.85–7.62)
NEUTS SEG NFR BLD AUTO: 92 % (ref 43–75)
O2 CT BLDA-SCNC: 14.9 ML/DL (ref 16–23)
OVALOCYTES BLD QL SMEAR: PRESENT
OXYHGB MFR BLDA: 97.7 % (ref 94–97)
PCO2 BLDA: 38.9 MM HG (ref 36–44)
PCO2 TEMP ADJ BLDA: 40.5 MM HG (ref 36–44)
PEEP RESPIRATORY: 6 CM[H2O]
PH BLD: 7.46 [PH] (ref 7.35–7.45)
PH BLDA: 7.48 [PH] (ref 7.35–7.45)
PLATELET # BLD AUTO: 433 THOUSANDS/UL (ref 149–390)
PLATELET BLD QL SMEAR: ABNORMAL
PMV BLD AUTO: 10.2 FL (ref 8.9–12.7)
PO2 BLD: 112.4 MM HG (ref 75–129)
PO2 BLDA: 106.8 MM HG (ref 75–129)
POLYCHROMASIA BLD QL SMEAR: PRESENT
POTASSIUM SERPL-SCNC: 5 MMOL/L (ref 3.5–5.3)
RBC # BLD AUTO: 3.6 MILLION/UL (ref 3.88–5.62)
RBC MORPH BLD: PRESENT
SODIUM SERPL-SCNC: 141 MMOL/L (ref 135–147)
SPECIMEN SOURCE: ABNORMAL
SPHEROCYTES BLD QL SMEAR: PRESENT
VENT AC: 16
VENT- AC: AC
VT SETTING VENT: 440 ML
WBC # BLD AUTO: 27.32 THOUSAND/UL (ref 4.31–10.16)

## 2025-04-05 PROCEDURE — 85007 BL SMEAR W/DIFF WBC COUNT: CPT

## 2025-04-05 PROCEDURE — 85027 COMPLETE CBC AUTOMATED: CPT

## 2025-04-05 PROCEDURE — 80048 BASIC METABOLIC PNL TOTAL CA: CPT

## 2025-04-05 PROCEDURE — 99291 CRITICAL CARE FIRST HOUR: CPT | Performed by: INTERNAL MEDICINE

## 2025-04-05 PROCEDURE — 36600 WITHDRAWAL OF ARTERIAL BLOOD: CPT

## 2025-04-05 PROCEDURE — 94150 VITAL CAPACITY TEST: CPT

## 2025-04-05 PROCEDURE — 94003 VENT MGMT INPAT SUBQ DAY: CPT

## 2025-04-05 PROCEDURE — 94760 N-INVAS EAR/PLS OXIMETRY 1: CPT

## 2025-04-05 PROCEDURE — 94640 AIRWAY INHALATION TREATMENT: CPT

## 2025-04-05 PROCEDURE — 82805 BLOOD GASES W/O2 SATURATION: CPT

## 2025-04-05 RX ADMIN — BUDESONIDE 0.25 MG: 0.25 INHALANT RESPIRATORY (INHALATION) at 19:32

## 2025-04-05 RX ADMIN — CARBIDOPA AND LEVODOPA 1 TABLET: 25; 100 TABLET ORAL at 15:25

## 2025-04-05 RX ADMIN — IPRATROPIUM BROMIDE 0.5 MG: 0.5 SOLUTION RESPIRATORY (INHALATION) at 19:32

## 2025-04-05 RX ADMIN — CARBIDOPA AND LEVODOPA 1 TABLET: 25; 100 TABLET ORAL at 21:37

## 2025-04-05 RX ADMIN — HEPARIN SODIUM 5000 UNITS: 5000 INJECTION INTRAVENOUS; SUBCUTANEOUS at 05:48

## 2025-04-05 RX ADMIN — ROPINIROLE HYDROCHLORIDE 0.5 MG: 0.25 TABLET, FILM COATED ORAL at 21:36

## 2025-04-05 RX ADMIN — IPRATROPIUM BROMIDE 0.5 MG: 0.5 SOLUTION RESPIRATORY (INHALATION) at 13:42

## 2025-04-05 RX ADMIN — CEFTRIAXONE 2000 MG: 10 INJECTION, POWDER, FOR SOLUTION INTRAVENOUS at 17:18

## 2025-04-05 RX ADMIN — LEVALBUTEROL HYDROCHLORIDE 1.25 MG: 1.25 SOLUTION RESPIRATORY (INHALATION) at 07:25

## 2025-04-05 RX ADMIN — MIRTAZAPINE 15 MG: 15 TABLET, FILM COATED ORAL at 21:36

## 2025-04-05 RX ADMIN — IPRATROPIUM BROMIDE 0.5 MG: 0.5 SOLUTION RESPIRATORY (INHALATION) at 07:25

## 2025-04-05 RX ADMIN — ACETAMINOPHEN 975 MG: 650 SUSPENSION ORAL at 15:25

## 2025-04-05 RX ADMIN — BUDESONIDE 0.25 MG: 0.25 INHALANT RESPIRATORY (INHALATION) at 07:25

## 2025-04-05 RX ADMIN — Medication 20 MG: at 08:05

## 2025-04-05 RX ADMIN — CHLORHEXIDINE GLUCONATE 0.12% ORAL RINSE 15 ML: 1.2 LIQUID ORAL at 21:37

## 2025-04-05 RX ADMIN — LEVALBUTEROL HYDROCHLORIDE 1.25 MG: 1.25 SOLUTION RESPIRATORY (INHALATION) at 13:42

## 2025-04-05 RX ADMIN — CARBIDOPA AND LEVODOPA 1 TABLET: 25; 100 TABLET ORAL at 08:05

## 2025-04-05 RX ADMIN — POLYETHYLENE GLYCOL 3350 17 G: 17 POWDER, FOR SOLUTION ORAL at 08:05

## 2025-04-05 RX ADMIN — MELATONIN 3 MG: 3 TAB ORAL at 21:36

## 2025-04-05 RX ADMIN — ESCITALOPRAM OXALATE 5 MG: 10 TABLET ORAL at 08:05

## 2025-04-05 RX ADMIN — LEVALBUTEROL HYDROCHLORIDE 1.25 MG: 1.25 SOLUTION RESPIRATORY (INHALATION) at 19:32

## 2025-04-05 RX ADMIN — SENNOSIDES AND DOCUSATE SODIUM 1 TABLET: 50; 8.6 TABLET ORAL at 21:37

## 2025-04-05 RX ADMIN — CHLORHEXIDINE GLUCONATE 0.12% ORAL RINSE 15 ML: 1.2 LIQUID ORAL at 08:06

## 2025-04-05 NOTE — ASSESSMENT & PLAN NOTE
Assessment:  Presented with septic shock secondary to pneumonia given leukocytosis and elevated lactate.  Bandemia.  Remains afebrile  Negative MRSA.  Negative urine culture.  Negative pleural fluid culture.  Trending down procalcitonin.  Positive bronchial culture also for group B beta-hemolytic Streptococcus sensitive to penicillin.  3/31- Patient had low blood pressure with systolic in the 80s and diastolic in the 50s after his bronchoscopy.  He received albumin and 1 L Isolyte with no response.  He eventually was placed on Levophed drip.   Blood culture was negative at 5 days.    Plan:  Patient is off vasopressors.  Continue antibiotics as below.

## 2025-04-05 NOTE — PLAN OF CARE
Problem: Prexisting or High Potential for Compromised Skin Integrity  Goal: Skin integrity is maintained or improved  Description: INTERVENTIONS:- Identify patients at risk for skin breakdown- Assess and monitor skin integrity- Assess and monitor nutrition and hydration status- Monitor labs - Assess for incontinence - Turn and reposition patient- Assist with mobility/ambulation- Relieve pressure over bony prominences- Avoid friction and shearing- Provide appropriate hygiene as needed including keeping skin clean and dry- Evaluate need for skin moisturizer/barrier cream- Collaborate with interdisciplinary team - Patient/family teaching- Consider wound care consult   Outcome: Progressing     Problem: SAFETY ADULT  Goal: Patient will remain free of falls  Description: INTERVENTIONS:- Educate patient/family on patient safety including physical limitations- Instruct patient to call for assistance with activity - Consult OT/PT to assist with strengthening/mobility - Keep Call bell within reach- Keep bed low and locked with side rails adjusted as appropriate- Keep care items and personal belongings within reach- Initiate and maintain comfort rounds- Make Fall Risk Sign visible to staff-  Apply yellow socks and bracelet for high fall risk patients- Consider moving patient to room near nurses station  INTERVENTIONS:- Educate patient/family on patient safety including physical limitations- Instruct patient to call for assistance with activity - Consult OT/PT to assist with strengthening/mobility - Keep Call bell within reach- Keep bed low and locked with side rails adjusted as appropriate- Keep care items and personal belongings within reach- Initiate and maintain comfort rounds- Make Fall Risk Sign visible to staff- Apply yellow socks and bracelet for high fall risk patients- Consider moving patient to room near nurses station  Outcome: Progressing  Goal: Maintain or return to baseline ADL function  Description:  INTERVENTIONS:-  Assess patient's ability to carry out ADLs; assess patient's baseline for ADL function and identify physical deficits which impact ability to perform ADLs (bathing, care of mouth/teeth, toileting, grooming, dressing, etc.)- Assess/evaluate cause of self-care deficits - Assess range of motion- Assess patient's mobility; develop plan if impaired- Assess patient's need for assistive devices and provide as appropriate- Encourage maximum independence but intervene and supervise when necessary- Involve family in performance of ADLs- Assess for home care needs following discharge - Consider OT consult to assist with ADL evaluation and planning for discharge- Provide patient education as appropriate  INTERVENTIONS:-  Assess patient's ability to carry out ADLs; assess patient's baseline for ADL function and identify physical deficits which impact ability to perform ADLs (bathing, care of mouth/teeth, toileting, grooming, dressing, etc.)- Assess/evaluate cause of self-care deficits - Assess range of motion- Assess patient's mobility; develop plan if impaired- Assess patient's need for assistive devices and provide as appropriate- Encourage maximum independence but intervene and supervise when necessary- Involve family in performance of ADLs- Assess for home care needs following discharge - Consider OT consult to assist with ADL evaluation and planning for discharge- Provide patient education as appropriate  Outcome: Progressing

## 2025-04-05 NOTE — QUICK NOTE
The patient's nephew who is the POA was at the bedside.  I have updated him about the patient's condition.  He would like to discuss with his sister and then decide if they would like to proceed with the plan of extubating him to BiPAP/high flow oxygen or just extubate him and to comfort measures.  They are planning to decide by tomorrow morning.

## 2025-04-05 NOTE — ASSESSMENT & PLAN NOTE
Patient has pneumonia secondary to aspiration with possible loculated parapneumonic effusion.  3/31- chest tube was placed and bronchoscopy for mucous plugging at bedside.  Pleural fluid is positive for lights criteria indicating exudative.  pH 7.4  Negative strep and Legionella.  Negative AFB  Positive bronchial culture also for group B beta-hemolytic Streptococcus sensitive to penicillin.    Plan:  De-escalated to ceftriaxone. Day #3. Anticipate a course of 2 to 4 weeks.  Total antibiotics day #6   Completed tPA and dornase for chest tube.  Trend fever curve.  CBC daily.

## 2025-04-05 NOTE — ASSESSMENT & PLAN NOTE
Patient has pneumonia secondary to aspiration with possible loculated parapneumonic effusion.  3/31- chest tube was placed and bronchoscopy for mucous plugging at bedside.  Pleural fluid is positive for lights criteria indicating exudative.  pH 7.4  Negative strep and Legionella.  Negative AFB  Positive bronchial culture also for group B beta-hemolytic Streptococcus sensitive to penicillin.    Plan:  De-escalated to ceftriaxone. Day #2. Anticipate a course of 2 to 4 weeks.  Total antibiotics day #5   Completed tPA and dornase for chest tube.  Trend fever curve.  CBC daily.

## 2025-04-05 NOTE — PLAN OF CARE
Problem: Prexisting or High Potential for Compromised Skin Integrity  Goal: Skin integrity is maintained or improved  Description: INTERVENTIONS:- Identify patients at risk for skin breakdown- Assess and monitor skin integrity- Assess and monitor nutrition and hydration status- Monitor labs - Assess for incontinence - Turn and reposition patient- Assist with mobility/ambulation- Relieve pressure over bony prominences- Avoid friction and shearing- Provide appropriate hygiene as needed including keeping skin clean and dry- Evaluate need for skin moisturizer/barrier cream- Collaborate with interdisciplinary team - Patient/family teaching- Consider wound care consult   Outcome: Progressing     Problem: SAFETY ADULT  Goal: Patient will remain free of falls  Description: INTERVENTIONS:- Educate patient/family on patient safety including physical limitations- Instruct patient to call for assistance with activity - Consult OT/PT to assist with strengthening/mobility - Keep Call bell within reach- Keep bed low and locked with side rails adjusted as appropriate- Keep care items and personal belongings within reach- Initiate and maintain comfort rounds- Make Fall Risk Sign visible to staff-  Apply yellow socks and bracelet for high fall risk patients- Consider moving patient to room near nurses station  INTERVENTIONS:- Educate patient/family on patient safety including physical limitations- Instruct patient to call for assistance with activity - Consult OT/PT to assist with strengthening/mobility - Keep Call bell within reach- Keep bed low and locked with side rails adjusted as appropriate- Keep care items and personal belongings within reach- Initiate and maintain comfort rounds- Make Fall Risk Sign visible to staff- Apply yellow socks and bracelet for high fall risk patients- Consider moving patient to room near nurses station  Outcome: Progressing  Goal: Maintain or return to baseline ADL function  Description:  INTERVENTIONS:-  Assess patient's ability to carry out ADLs; assess patient's baseline for ADL function and identify physical deficits which impact ability to perform ADLs (bathing, care of mouth/teeth, toileting, grooming, dressing, etc.)- Assess/evaluate cause of self-care deficits - Assess range of motion- Assess patient's mobility; develop plan if impaired- Assess patient's need for assistive devices and provide as appropriate- Encourage maximum independence but intervene and supervise when necessary- Involve family in performance of ADLs- Assess for home care needs following discharge - Consider OT consult to assist with ADL evaluation and planning for discharge- Provide patient education as appropriate  INTERVENTIONS:-  Assess patient's ability to carry out ADLs; assess patient's baseline for ADL function and identify physical deficits which impact ability to perform ADLs (bathing, care of mouth/teeth, toileting, grooming, dressing, etc.)- Assess/evaluate cause of self-care deficits - Assess range of motion- Assess patient's mobility; develop plan if impaired- Assess patient's need for assistive devices and provide as appropriate- Encourage maximum independence but intervene and supervise when necessary- Involve family in performance of ADLs- Assess for home care needs following discharge - Consider OT consult to assist with ADL evaluation and planning for discharge- Provide patient education as appropriate  Outcome: Progressing  Goal: Maintains/Returns to pre admission functional level  Description: INTERVENTIONS:- Perform AM-PAC 6 Click Basic Mobility/ Daily Activity assessment daily.- Set and communicate daily mobility goal to care team and patient/family/caregiver. - Collaborate with rehabilitation services on mobility goals if consulted- Out of bed for toileting- Record patient progress and toleration of activity level   INTERVENTIONS:- Perform AM-PAC 6 Click Basic Mobility/ Daily Activity assessment  daily.- Set and communicate daily mobility goal to care team and patient/family/caregiver. - Collaborate with rehabilitation services on mobility goals if consulted- Out of bed for toileting- Record patient progress and toleration of activity level   Outcome: Progressing     Problem: DISCHARGE PLANNING  Goal: Discharge to home or other facility with appropriate resources  Description: INTERVENTIONS:- Identify barriers to discharge w/patient and caregiver- Arrange for needed discharge resources and transportation as appropriate- Identify discharge learning needs (meds, wound care, etc.)- Arrange for interpretive services to assist at discharge as needed- Refer to Case Management Department for coordinating discharge planning if the patient needs post-hospital services based on physician/advanced practitioner order or complex needs related to functional status, cognitive ability, or social support system  INTERVENTIONS:- Identify barriers to discharge w/patient and caregiver- Arrange for needed discharge resources and transportation as appropriate- Identify discharge learning needs (meds, wound care, etc.)- Arrange for interpretive services to assist at discharge as needed- Refer to Case Management Department for coordinating discharge planning if the patient needs post-hospital services based on physician/advanced practitioner order or complex needs related to functional status, cognitive ability, or social support system  Outcome: Progressing     Problem: Knowledge Deficit  Goal: Patient/family/caregiver demonstrates understanding of disease process, treatment plan, medications, and discharge instructions  Description: Complete learning assessment and assess knowledge base.Interventions:- Provide teaching at level of understanding- Provide teaching via preferred learning methods  Complete learning assessment and assess knowledge base.Interventions:- Provide teaching at level of understanding- Provide teaching via  preferred learning methods  Outcome: Progressing     Problem: PAIN - ADULT  Goal: Verbalizes/displays adequate comfort level or baseline comfort level  Description: Interventions:- Encourage patient to monitor pain and request assistance- Assess pain using appropriate pain scale- Administer analgesics based on type and severity of pain and evaluate response- Implement non-pharmacological measures as appropriate and evaluate response- Consider cultural and social influences on pain and pain management- Notify physician/advanced practitioner if interventions unsuccessful or patient reports new pain  Outcome: Progressing     Problem: INFECTION - ADULT  Goal: Absence or prevention of progression during hospitalization  Description: INTERVENTIONS:- Assess and monitor for signs and symptoms of infection- Monitor lab/diagnostic results- Monitor all insertion sites, i.e. indwelling lines, tubes, and drains- Monitor endotracheal if appropriate and nasal secretions for changes in amount and color- [place] appropriate cooling/warming therapies per order- Administer medications as ordered- Instruct and encourage patient and family to use good hand hygiene technique- Identify and instruct in appropriate isolation precautions for identified infection/condition  Outcome: Progressing     Problem: RESPIRATORY - ADULT  Goal: Achieves optimal ventilation and oxygenation  Description: INTERVENTIONS:- Assess for changes in respiratory status- Assess for changes in mentation and behavior- Position to facilitate oxygenation and minimize respiratory effort- Oxygen administered by appropriate delivery if ordered- Initiate smoking cessation education as indicated- Encourage broncho-pulmonary hygiene including cough, deep breathe, Incentive Spirometry- Assess the need for suctioning and aspirate as needed- Assess and instruct to report SOB or any respiratory difficulty- Respiratory Therapy support as indicated  Outcome: Progressing      Problem: Nutrition/Hydration-ADULT  Goal: Nutrient/Hydration intake appropriate for improving, restoring or maintaining nutritional needs  Description: Monitor and assess patient's nutrition/hydration status for malnutrition. Collaborate with interdisciplinary team and initiate plan and interventions as ordered.  Monitor patient's weight and dietary intake as ordered or per policy. Utilize nutrition screening tool and intervene as necessary. Determine patient's food preferences and provide high-protein, high-caloric foods as appropriate. INTERVENTIONS:- Monitor oral intake, urinary output, labs, and treatment plans- Assess nutrition and hydration status and recommend course of action- Evaluate amount of meals eaten- Assist patient with eating if necessary - Allow adequate time for meals- Recommend/ encourage appropriate diets, oral nutritional supplements, and vitamin/mineral supplements- Order, calculate, and assess calorie counts as needed- Recommend, monitor, and adjust tube feedings and TPN/PPN based on assessed needs- Assess need for intravenous fluids- Provide specific nutrition/hydration education as appropriate- Include patient/family/caregiver in decisions related to nutrition  Monitor and assess patient's nutrition/hydration status for malnutrition. Collaborate with interdisciplinary team and initiate plan and interventions as ordered.  Monitor patient's weight and dietary intake as ordered or per policy. Utilize nutrition screening tool and intervene as necessary. Determine patient's food preferences and provide high-protein, high-caloric foods as appropriate. INTERVENTIONS:- Monitor oral intake, urinary output, labs, and treatment plans- Assess nutrition and hydration status and recommend course of action- Evaluate amount of meals eaten- Assist patient with eating if necessary - Allow adequate time for meals- Recommend/ encourage appropriate diets, oral nutritional supplements, and vitamin/mineral  supplements- Order, calculate, and assess calorie counts as needed- Recommend, monitor, and adjust tube feedings and TPN/PPN based on assessed needs- Assess need for intravenous fluids- Provide specific nutrition/hydration education as appropriate- Include patient/family/caregiver in decisions related to nutrition  Outcome: Progressing     Problem: COPING  Goal: Pt/Family able to verbalize concerns and demonstrate effective coping strategies  Description: INTERVENTIONS:- Assist patient/family to identify coping skills, available support systems and cultural and spiritual values- Provide emotional support, including active listening and acknowledgement of concerns of patient and caregivers- Reduce environmental stimuli, as able- Provide patient education- Assess for spiritual pain/suffering and initiate spiritual care, including notification of Pastoral Care or [place] based community as needed- Assess effectiveness of coping strategies  Outcome: Progressing  Goal: Will report anxiety at manageable levels  Description: INTERVENTIONS:- Administer medication as ordered- Teach and encourage coping skills- Provide emotional support- Assess patient/family for anxiety and ability to cope  Outcome: Progressing     Problem: SAFETY,RESTRAINT: NV/NON-SELF DESTRUCTIVE BEHAVIOR  Goal: Remains free of harm/injury (restraint for non violent/non self-detsructive behavior)  Description: INTERVENTIONS:- Instruct patient/family regarding restraint use - Assess and monitor physiologic and psychological status - Provide interventions and comfort measures to meet assessed patient needs - Identify and implement measures to help patient regain control- Assess readiness for release of restraint   Outcome: Progressing  Goal: Returns to optimal restraint-free functioning  Description: INTERVENTIONS:- Assess the patient's behavior and symptoms that indicate continued need for restraint- Identify and implement measures to help patient regain  control- Assess readiness for release of restraint   Outcome: Progressing

## 2025-04-05 NOTE — ASSESSMENT & PLAN NOTE
Assessment:  Patient presented with progressive worsening of shortness of breath for 1 week from [facility].   In the ED, he was requiring BiPAP for increased work of breathing and hypoxemia.  Received ceftriaxone and azithromycin.  CT chest:  Large loculated right pleural effusion with dense consolidations within the RML and RLL.   Occlusion of the right bronchus intermedius.  Given continuous BiPAP requirement patient was admitted to the ICU.  Initial VBG was  7.30/60/30/31  3/31- given increased work of breathing on BiPAP a decision was made to intubate.  Patient was agreeable as well as his POA nephew.  SBT on 4/2 with high RSBI on pressure support 6/6  4/3-overnight, patient was maintained on pressure control 8/6 however he became tachypneic with tidal volumes of 150-160 and was switched back to volume control.    Plan:  Plan for SBT today   Fentanyl as needed for sedation.  Xopenex/Atrovent  Airway clearance protocol.  Tube feeds   Continue antibiotics as below.

## 2025-04-05 NOTE — ASSESSMENT & PLAN NOTE
Lab Results   Component Value Date    EGFR 33 04/05/2025    EGFR 33 04/04/2025    EGFR 32 04/03/2025    CREATININE 1.81 (H) 04/05/2025    CREATININE 1.79 (H) 04/04/2025    CREATININE 1.86 (H) 04/03/2025     Baseline creatinine (0.9-1.2)  PTA creatinine 1.92  BUN/creatinine ratio > 20  Likely prerenal/ATN etiology given poor oral intake and septic shock.    Plan:  Discontinue LR to 75 mL/hour  Continue to monitor I's and O's  Daily BMP  Avoid nephrotoxic/hypotensive agents

## 2025-04-05 NOTE — ASSESSMENT & PLAN NOTE
Assessment:  Patient presented with progressive worsening of shortness of breath for 1 week from [facility].   In the ED, he was requiring BiPAP for increased work of breathing and hypoxemia.  Received ceftriaxone and azithromycin.  CT chest:  Large loculated right pleural effusion with dense consolidations within the RML and RLL.   Occlusion of the right bronchus intermedius.  Given continuous BiPAP requirement patient was admitted to the ICU.  Initial VBG was  7.30/60/30/31  3/31- given increased work of breathing on BiPAP a decision was made to intubate.  Patient was agreeable as well as his POA nephew.  SBT on 4/2 with high RSBI on pressure support 6/6  4/3-overnight, patient was maintained on pressure control 8/6 however he became tachypneic with tidal volumes of 150-160 and was switched back to volume control.    Plan:  Patient continues to become tachypneic on SBT trial on 4/5/2025  Will have conversation with POA nephew regarding inability to wean patient off the vent in the next few days and next steps.  Fentanyl as needed for sedation.  Xopenex/Atrovent  Airway clearance protocol.  Tube feeds   Continue antibiotics as below.

## 2025-04-05 NOTE — RESPIRATORY THERAPY NOTE
Pt s/p intubation for Respiratory failure. Pt failed bipap on 3/31. Pt has pneumonia secondary to plerual effusions. Pt remains on mechanical ventilation. Pt failed wean 4/4 due increased RR. No plan to wean this p.m. Will continue vent settings as ordered.       04/04/25 2001   Respiratory Assessment   Assessment Type During-treatment   General Appearance Sedated   Respiratory Pattern Assisted   Chest Assessment Chest expansion symmetrical   Bilateral Breath Sounds Diminished   R Breath Sounds Diminished   Vent Information   Vent ID [id number]   Vent type Mcclellan C3   Mcclellan Vent Mode (S)CMV   $ Pulse Oximetry Spot Check Charge Completed   SpO2 98 %   (S)CMV Settings   Resp Rate (BPM) 16 BPM   VT (mL) 440 mL   FIO2 (%) 50 %   PEEP (cmH2O) 6 cmH2O   I:E Ratio 1:3.7   Insp Time (%) 0.8 %   Flow Trigger (LPM) 2   Humidification Heater   Heater Temperature (Set) 98.6 °F (37 °C)   Pause Time (%) 0 %   (S)CMV Actuals   Resp Rate (BPM) 31 BPM   VT (mL) 337   MV 9.6   MAP (cmH2O) 15 cmH2O   Peak Pressure (cmH2O) 29 cmH2O   I:E Ratio (Obs) 1:1.1   Insp Resistance 14   Heater Temperature (Obs) 98.6 °F (37 °C)   Static Compliance (mL/cmH20) 25.6 mL/cmH2O   (S)CMV Alarms   High Peak Pressure (cmH2O) 50   Low Pressure (cmH2O) 5 cm H2O   High Resp Rate (BPM) 50 BPM   Low Resp Rate (BPM) 5 BPM   High MV (L/min) 20 L/min   Low MV (L/min) 4 L/min   High VT (mL) 800 mL   Low VT (mL) 200 mL   Leak (%) 0 %   Apnea Time (s) 20 S   Maintenance   Alarm (pink) cable attached No   Resuscitation bag with peep valve at bedside Yes   Water bag changed No   Circuit changed No   Daily Screen   Patient safety screen outcome: Failed   Not Ready for Weaning due to: Underline problem not resolved   IHI Ventilator Associated Pneumonia Bundle   Daily Awakening Trials Performed Not applicable (Comment)   Daily Assessment of Readiness to Extubate Not applicable (Comment)   Head of Bed Elevated HOB 30   ETT  Cuffed 8 mm   Placement Date/Time: 03/31/25 (c)  1053   Type: Cuffed  Tube Size: 8 mm  Location: Oral  Insertion attempts: 1  Placement Verification: Chest x-ray  Secured at (cm): 28   Secured at (cm) 27   Measured from Lips   Secured Location Right   Secured by Commercial tube francisco   Site Condition Dry   Cuff Pressure (color) Green   HI-LO Suction  Intermittent suction   HI-LO Secretions Scant

## 2025-04-05 NOTE — PLAN OF CARE
Problem: Prexisting or High Potential for Compromised Skin Integrity  Goal: Skin integrity is maintained or improved  Description: INTERVENTIONS:- Identify patients at risk for skin breakdown- Assess and monitor skin integrity- Assess and monitor nutrition and hydration status- Monitor labs - Assess for incontinence - Turn and reposition patient- Assist with mobility/ambulation- Relieve pressure over bony prominences- Avoid friction and shearing- Provide appropriate hygiene as needed including keeping skin clean and dry- Evaluate need for skin moisturizer/barrier cream- Collaborate with interdisciplinary team - Patient/family teaching- Consider wound care consult   Outcome: Progressing     Problem: SAFETY ADULT  Goal: Patient will remain free of falls  Description: INTERVENTIONS:- Educate patient/family on patient safety including physical limitations- Instruct patient to call for assistance with activity - Consult OT/PT to assist with strengthening/mobility - Keep Call bell within reach- Keep bed low and locked with side rails adjusted as appropriate- Keep care items and personal belongings within reach- Initiate and maintain comfort rounds- Make Fall Risk Sign visible to staff-  Apply yellow socks and bracelet for high fall risk patients- Consider moving patient to room near nurses station  INTERVENTIONS:- Educate patient/family on patient safety including physical limitations- Instruct patient to call for assistance with activity - Consult OT/PT to assist with strengthening/mobility - Keep Call bell within reach- Keep bed low and locked with side rails adjusted as appropriate- Keep care items and personal belongings within reach- Initiate and maintain comfort rounds- Make Fall Risk Sign visible to staff- Apply yellow socks and bracelet for high fall risk patients- Consider moving patient to room near nurses station  Outcome: Progressing  Goal: Maintain or return to baseline ADL function  Description:  INTERVENTIONS:-  Assess patient's ability to carry out ADLs; assess patient's baseline for ADL function and identify physical deficits which impact ability to perform ADLs (bathing, care of mouth/teeth, toileting, grooming, dressing, etc.)- Assess/evaluate cause of self-care deficits - Assess range of motion- Assess patient's mobility; develop plan if impaired- Assess patient's need for assistive devices and provide as appropriate- Encourage maximum independence but intervene and supervise when necessary- Involve family in performance of ADLs- Assess for home care needs following discharge - Consider OT consult to assist with ADL evaluation and planning for discharge- Provide patient education as appropriate  INTERVENTIONS:-  Assess patient's ability to carry out ADLs; assess patient's baseline for ADL function and identify physical deficits which impact ability to perform ADLs (bathing, care of mouth/teeth, toileting, grooming, dressing, etc.)- Assess/evaluate cause of self-care deficits - Assess range of motion- Assess patient's mobility; develop plan if impaired- Assess patient's need for assistive devices and provide as appropriate- Encourage maximum independence but intervene and supervise when necessary- Involve family in performance of ADLs- Assess for home care needs following discharge - Consider OT consult to assist with ADL evaluation and planning for discharge- Provide patient education as appropriate  Outcome: Progressing  Goal: Maintains/Returns to pre admission functional level  Description: INTERVENTIONS:- Perform AM-PAC 6 Click Basic Mobility/ Daily Activity assessment daily.- Set and communicate daily mobility goal to care team and patient/family/caregiver. - Collaborate with rehabilitation services on mobility goals if consulted- Out of bed for toileting- Record patient progress and toleration of activity level   INTERVENTIONS:- Perform AM-PAC 6 Click Basic Mobility/ Daily Activity assessment  daily.- Set and communicate daily mobility goal to care team and patient/family/caregiver. - Collaborate with rehabilitation services on mobility goals if consulted- Out of bed for toileting- Record patient progress and toleration of activity level   Outcome: Progressing     Problem: DISCHARGE PLANNING  Goal: Discharge to home or other facility with appropriate resources  Description: INTERVENTIONS:- Identify barriers to discharge w/patient and caregiver- Arrange for needed discharge resources and transportation as appropriate- Identify discharge learning needs (meds, wound care, etc.)- Arrange for interpretive services to assist at discharge as needed- Refer to Case Management Department for coordinating discharge planning if the patient needs post-hospital services based on physician/advanced practitioner order or complex needs related to functional status, cognitive ability, or social support system  INTERVENTIONS:- Identify barriers to discharge w/patient and caregiver- Arrange for needed discharge resources and transportation as appropriate- Identify discharge learning needs (meds, wound care, etc.)- Arrange for interpretive services to assist at discharge as needed- Refer to Case Management Department for coordinating discharge planning if the patient needs post-hospital services based on physician/advanced practitioner order or complex needs related to functional status, cognitive ability, or social support system  Outcome: Progressing     Problem: Knowledge Deficit  Goal: Patient/family/caregiver demonstrates understanding of disease process, treatment plan, medications, and discharge instructions  Description: Complete learning assessment and assess knowledge base.Interventions:- Provide teaching at level of understanding- Provide teaching via preferred learning methods  Complete learning assessment and assess knowledge base.Interventions:- Provide teaching at level of understanding- Provide teaching via  preferred learning methods  Outcome: Progressing     Problem: PAIN - ADULT  Goal: Verbalizes/displays adequate comfort level or baseline comfort level  Description: Interventions:- Encourage patient to monitor pain and request assistance- Assess pain using appropriate pain scale- Administer analgesics based on type and severity of pain and evaluate response- Implement non-pharmacological measures as appropriate and evaluate response- Consider cultural and social influences on pain and pain management- Notify physician/advanced practitioner if interventions unsuccessful or patient reports new pain  Outcome: Progressing     Problem: INFECTION - ADULT  Goal: Absence or prevention of progression during hospitalization  Description: INTERVENTIONS:- Assess and monitor for signs and symptoms of infection- Monitor lab/diagnostic results- Monitor all insertion sites, i.e. indwelling lines, tubes, and drains- Monitor endotracheal if appropriate and nasal secretions for changes in amount and color- [place] appropriate cooling/warming therapies per order- Administer medications as ordered- Instruct and encourage patient and family to use good hand hygiene technique- Identify and instruct in appropriate isolation precautions for identified infection/condition  Outcome: Progressing     Problem: RESPIRATORY - ADULT  Goal: Achieves optimal ventilation and oxygenation  Description: INTERVENTIONS:- Assess for changes in respiratory status- Assess for changes in mentation and behavior- Position to facilitate oxygenation and minimize respiratory effort- Oxygen administered by appropriate delivery if ordered- Initiate smoking cessation education as indicated- Encourage broncho-pulmonary hygiene including cough, deep breathe, Incentive Spirometry- Assess the need for suctioning and aspirate as needed- Assess and instruct to report SOB or any respiratory difficulty- Respiratory Therapy support as indicated  Outcome: Progressing      Problem: Nutrition/Hydration-ADULT  Goal: Nutrient/Hydration intake appropriate for improving, restoring or maintaining nutritional needs  Description: Monitor and assess patient's nutrition/hydration status for malnutrition. Collaborate with interdisciplinary team and initiate plan and interventions as ordered.  Monitor patient's weight and dietary intake as ordered or per policy. Utilize nutrition screening tool and intervene as necessary. Determine patient's food preferences and provide high-protein, high-caloric foods as appropriate. INTERVENTIONS:- Monitor oral intake, urinary output, labs, and treatment plans- Assess nutrition and hydration status and recommend course of action- Evaluate amount of meals eaten- Assist patient with eating if necessary - Allow adequate time for meals- Recommend/ encourage appropriate diets, oral nutritional supplements, and vitamin/mineral supplements- Order, calculate, and assess calorie counts as needed- Recommend, monitor, and adjust tube feedings and TPN/PPN based on assessed needs- Assess need for intravenous fluids- Provide specific nutrition/hydration education as appropriate- Include patient/family/caregiver in decisions related to nutrition  Monitor and assess patient's nutrition/hydration status for malnutrition. Collaborate with interdisciplinary team and initiate plan and interventions as ordered.  Monitor patient's weight and dietary intake as ordered or per policy. Utilize nutrition screening tool and intervene as necessary. Determine patient's food preferences and provide high-protein, high-caloric foods as appropriate. INTERVENTIONS:- Monitor oral intake, urinary output, labs, and treatment plans- Assess nutrition and hydration status and recommend course of action- Evaluate amount of meals eaten- Assist patient with eating if necessary - Allow adequate time for meals- Recommend/ encourage appropriate diets, oral nutritional supplements, and vitamin/mineral  supplements- Order, calculate, and assess calorie counts as needed- Recommend, monitor, and adjust tube feedings and TPN/PPN based on assessed needs- Assess need for intravenous fluids- Provide specific nutrition/hydration education as appropriate- Include patient/family/caregiver in decisions related to nutrition  Outcome: Progressing     Problem: COPING  Goal: Pt/Family able to verbalize concerns and demonstrate effective coping strategies  Description: INTERVENTIONS:- Assist patient/family to identify coping skills, available support systems and cultural and spiritual values- Provide emotional support, including active listening and acknowledgement of concerns of patient and caregivers- Reduce environmental stimuli, as able- Provide patient education- Assess for spiritual pain/suffering and initiate spiritual care, including notification of Pastoral Care or randal based community as needed- Assess effectiveness of coping strategies  Outcome: Progressing  Goal: Will report anxiety at manageable levels  Description: INTERVENTIONS:- Administer medication as ordered- Teach and encourage coping skills- Provide emotional support- Assess patient/family for anxiety and ability to cope  Outcome: Progressing     Problem: SAFETY,RESTRAINT: NV/NON-SELF DESTRUCTIVE BEHAVIOR  Goal: Remains free of harm/injury (restraint for non violent/non self-detsructive behavior)  Description: INTERVENTIONS:- Instruct patient/family regarding restraint use - Assess and monitor physiologic and psychological status - Provide interventions and comfort measures to meet assessed patient needs - Identify and implement measures to help patient regain control- Assess readiness for release of restraint   Outcome: Progressing  Goal: Returns to optimal restraint-free functioning  Description: INTERVENTIONS:- Assess the patient's behavior and symptoms that indicate continued need for restraint- Identify and implement measures to help patient regain  control- Assess readiness for release of restraint   Outcome: Progressing

## 2025-04-05 NOTE — PROGRESS NOTES
Progress Note - Internal Medicine   Name: Den Warren 84 y.o. male I MRN: [id number]  Unit/Bed#: ICU 04 I Date of Admission: 3/30/2025   Date of Service: 4/5/2025 I Hospital Day: 5    Assessment & Plan  Acute hypoxemic and hypercarbic respiratory failure (HCC)  Assessment:  Patient presented with progressive worsening of shortness of breath for 1 week from [facility].   In the ED, he was requiring BiPAP for increased work of breathing and hypoxemia.  Received ceftriaxone and azithromycin.  CT chest:  Large loculated right pleural effusion with dense consolidations within the RML and RLL.   Occlusion of the right bronchus intermedius.  Given continuous BiPAP requirement patient was admitted to the ICU.  Initial VBG was  7.30/60/30/31  3/31- given increased work of breathing on BiPAP a decision was made to intubate.  Patient was agreeable as well as his POA nephew.  SBT on 4/2 with high RSBI on pressure support 6/6  4/3-overnight, patient was maintained on pressure control 8/6 however he became tachypneic with tidal volumes of 150-160 and was switched back to volume control.    Plan:  Patient continues to become tachypneic on SBT trial on 4/5/2025  Will have conversation with POA nephew regarding inability to wean patient off the vent in the next few days and next steps.  Fentanyl as needed for sedation.  Xopenex/Atrovent  Airway clearance protocol.  Tube feeds   Continue antibiotics as below.  Pneumonia  Patient has pneumonia secondary to aspiration with possible loculated parapneumonic effusion.  3/31- chest tube was placed and bronchoscopy for mucous plugging at bedside.  Pleural fluid is positive for lights criteria indicating exudative.  pH 7.4  Negative strep and Legionella.  Negative AFB  Positive bronchial culture also for group B beta-hemolytic Streptococcus sensitive to penicillin.    Plan:  De-escalated to ceftriaxone. Day #3. Anticipate a course of 2 to 4 weeks.  Total antibiotics day #6    Completed tPA and dornase for chest tube.  Trend fever curve.  CBC daily.    Acute kidney injury superimposed on stage 2 chronic kidney disease (HCC)  Lab Results   Component Value Date    EGFR 33 04/05/2025    EGFR 33 04/04/2025    EGFR 32 04/03/2025    CREATININE 1.81 (H) 04/05/2025    CREATININE 1.79 (H) 04/04/2025    CREATININE 1.86 (H) 04/03/2025     Baseline creatinine (0.9-1.2)  PTA creatinine 1.92  BUN/creatinine ratio > 20  Likely ATN in the setting of this.    Plan:  Continue to monitor I's and O's  Daily BMP  Avoid nephrotoxic/hypotensive agents  (HFpEF) heart failure with preserved ejection fraction (HCC)  Wt Readings from Last 3 Encounters:   04/04/25 78.7 kg (173 lb 8 oz)   02/21/24 77.6 kg (171 lb)   01/03/24 77.6 kg (171 lb)     Echocardiogram this admission shows EF 73%.  Normal diastolic function.  Mild aortic stenosis.  Consider restarting Home torsemide with blood pressure parameters.  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HCC)  Continue home Sinemet  3 times a day through OG tube.   Pulmonary emphysema (HCC)  Last PFT 2020: Moderate obstructive lung disease with hyperinflation and low DLCO.  Home inhalers include Pulmicort, Spiriva, and albuterol.    GERD (gastroesophageal reflux disease)  Continue home Protonix  Depression  Continue OP Mirtazapine 15 mg daily at bedtime.  Continue home Lexapro.  Anemia  Hemoglobin dropping from 13.3 on admission to 9.2  Last hemoglobin in 6/2024 was 10.7.  No signs of bleeding.  The initial reading was likely secondary to dehydration.     Ambulatory dysfunction  Fall precautions  PT/OT when appropriate.    Septic shock (HCC) (Resolved: 4/5/2025)  Assessment:  Presented with septic shock secondary to pneumonia given leukocytosis and elevated lactate.  Bandemia.  Remains afebrile  Negative MRSA.  Negative urine culture.  Negative pleural fluid culture.  Trending down procalcitonin.  Positive bronchial culture also for group B beta-hemolytic Streptococcus sensitive to  penicillin.  3/31- Patient had low blood pressure with systolic in the 80s and diastolic in the 50s after his bronchoscopy.  He received albumin and 1 L Isolyte with no response.  He eventually was placed on Levophed drip.   Blood culture was negative at 5 days.    Plan:  Patient is off vasopressors.  Continue antibiotics as below.    Disposition: Critical care    ICU Core Measures     Vented Patient  VAP Bundle  VAP bundle ordered     A: Assess, Prevent, and Manage Pain Has pain been assessed? Yes  Need for changes to pain regimen? No   B: Both Spontaneous Awakening Trials (SATs) and Spontaneous Breathing Trials (SBTs) Plan to perform spontaneous awakening trial today? Yes   Plan to perform spontaneous breathing trial today? Yes   Obvious barriers to extubation? Yes   C: Choice of Sedation RASS Goal: -1 Drowsy or 0 Alert and Calm  Need for changes to sedation or analgesia regimen? No   D: Delirium CAM-ICU: Negative   E: Early Mobility  Plan for early mobility? No   F: Family Engagement Plan for family engagement today? Yes       Antibiotic Review: Patient on appropriate coverage based on culture data.     Review of Invasive Devices:    [place] Plan: Continue for accurate I/O monitoring for 48 hours        Prophylaxis:  VTE VTE covered by:  heparin (porcine), Subcutaneous, 5,000 Units at 04/05/25 0548       Stress Ulcer  covered byomeprazole (PRILOSEC) suspension 2 mg/mL [[id number]], omeprazole (PriLOSEC) 20 mg delayed release capsule [[id number]] (Long-Term Med)         24 Hour Events :   No overnight events.    Subjective   Patient was seen and examined this morning.  Intubated but not sedated.  He is able to follow commands.  He denied any pain.  When asked about breathing tube he nodded his head that he wants the tube out.  I explained to the patient that his nephew MALIHA will be visiting today and will have conversation with him.    Objective :                   Vitals I/O      Most Recent Min/Max in 24hrs   Temp  99.8 °F (37.7 °C) Temp  Min: 98 °F (36.7 °C)  Max: 100.5 °F (38.1 °C)   Pulse 96 Pulse  Min: 88  Max: 102   Resp (!) 40 Resp  Min: 24  Max: 60   /74 BP  Min: 94/51  Max: 135/63   O2 Sat 98 % SpO2  Min: 95 %  Max: 98 %      Intake/Output Summary (Last 24 hours) at 4/5/2025 0824  Last data filed at 4/5/2025 0814  Gross per 24 hour   Intake 935 ml   Output 1855 ml   Net -920 ml       Diet Enteral/Parenteral; Tube Feeding No Oral Diet; Jevity 1.2 Ajay; Continuous; 65; Water; Every 6 hours    Invasive Monitoring   No invasive monitoring         Physical Exam   Physical Exam  Eyes:      Extraocular Movements: Extraocular movements intact.      Pupils: Pupils are equal, round, and reactive to light.   Skin:     General: Skin is warm.      Comments: Swollen left arm   HENT:      Head: Normocephalic and atraumatic.      Mouth/Throat:      Mouth: Mucous membranes are moist.   Cardiovascular:      Rate and Rhythm: Normal rate and regular rhythm.   Musculoskeletal:      Right lower leg: No edema.      Left lower leg: No edema.   Abdominal:      Palpations: Abdomen is soft.      Tenderness: There is no abdominal tenderness.   Constitutional:       General: He is not in acute distress.     Appearance: He is well-groomed. He is ill-appearing.      Interventions: He is intubated.   Pulmonary:      Effort: Pulmonary effort is normal. He is intubated.      Breath sounds: Normal breath sounds.   Neurological:      Mental Status: He is easily aroused. He is somnolent.          Diagnostic Studies        Lab Results: I have reviewed the following results:     Medications:  Scheduled PRN   budesonide, 0.25 mg, BID  carbidopa-levodopa, 1 tablet, TID  cefTRIAXone, 2,000 mg, Q24H  chlorhexidine, 15 mL, Q12H LINDA  escitalopram, 5 mg, Daily  heparin (porcine), 5,000 Units, Q8H LINDA  ipratropium, 0.5 mg, TID  levalbuterol, 1.25 mg, TID  melatonin, 3 mg, HS  mirtazapine, 15 mg, HS  omeprazole (PRILOSEC) suspension 2 mg/mL, 20 mg,  Daily  polyethylene glycol, 17 g, Daily  rOPINIRole, 0.5 mg, HS  senna-docusate sodium, 1 tablet, HS  [Held by provider] torsemide, 20 mg, Daily      acetaminophen, 975 mg, Q6H PRN  fentaNYL, 50 mcg, Q4H PRN       Continuous          Labs:   CBC    Recent Labs     04/04/25  0534 04/05/25  0430   WBC 29.97* 27.32*   HGB 9.9* 9.6*   HCT 30.7* 30.5*   * 433*     BMP    Recent Labs     04/04/25  0534 04/05/25  0430   SODIUM 139 141   K 4.8 5.0    106   CO2 29 30   AGAP 5 5   BUN 55* 59*   CREATININE 1.79* 1.81*   CALCIUM 8.1* 8.1*       Coags    No recent results     Additional Electrolytes  No recent results       Blood Gas    No recent results  No recent results LFTs  No recent results    Infectious  No recent results  Glucose  Recent Labs     04/04/25  0534 04/05/25  0430   GLUC 175* 173*

## 2025-04-05 NOTE — ASSESSMENT & PLAN NOTE
Lab Results   Component Value Date    EGFR 33 04/05/2025    EGFR 33 04/04/2025    EGFR 32 04/03/2025    CREATININE 1.81 (H) 04/05/2025    CREATININE 1.79 (H) 04/04/2025    CREATININE 1.86 (H) 04/03/2025     Baseline creatinine (0.9-1.2)  PTA creatinine 1.92  BUN/creatinine ratio > 20  Likely ATN in the setting of this.    Plan:  Continue to monitor I's and O's  Daily BMP  Avoid nephrotoxic/hypotensive agents

## 2025-04-06 LAB
ANION GAP SERPL CALCULATED.3IONS-SCNC: 3 MMOL/L (ref 4–13)
BASOPHILS # BLD AUTO: 0.07 THOUSANDS/ÂΜL (ref 0–0.1)
BASOPHILS NFR BLD AUTO: 0 % (ref 0–1)
BUN SERPL-MCNC: 63 MG/DL (ref 5–25)
CALCIUM SERPL-MCNC: 8.2 MG/DL (ref 8.4–10.2)
CHLORIDE SERPL-SCNC: 109 MMOL/L (ref 96–108)
CO2 SERPL-SCNC: 31 MMOL/L (ref 21–32)
CREAT SERPL-MCNC: 1.93 MG/DL (ref 0.6–1.3)
EOSINOPHIL # BLD AUTO: 0.26 THOUSAND/ÂΜL (ref 0–0.61)
EOSINOPHIL NFR BLD AUTO: 1 % (ref 0–6)
ERYTHROCYTE [DISTWIDTH] IN BLOOD BY AUTOMATED COUNT: 14.6 % (ref 11.6–15.1)
GFR SERPL CREATININE-BSD FRML MDRD: 30 ML/MIN/1.73SQ M
GLUCOSE SERPL-MCNC: 138 MG/DL (ref 65–140)
HCT VFR BLD AUTO: 30.5 % (ref 36.5–49.3)
HGB BLD-MCNC: 9.6 G/DL (ref 12–17)
IMM GRANULOCYTES # BLD AUTO: >0.5 THOUSAND/UL (ref 0–0.2)
IMM GRANULOCYTES NFR BLD AUTO: 4 % (ref 0–2)
LYMPHOCYTES # BLD AUTO: 0.88 THOUSANDS/ÂΜL (ref 0.6–4.47)
LYMPHOCYTES NFR BLD AUTO: 4 % (ref 14–44)
MCH RBC QN AUTO: 27.2 PG (ref 26.8–34.3)
MCHC RBC AUTO-ENTMCNC: 31.5 G/DL (ref 31.4–37.4)
MCV RBC AUTO: 86 FL (ref 82–98)
MONOCYTES # BLD AUTO: 1.76 THOUSAND/ÂΜL (ref 0.17–1.22)
MONOCYTES NFR BLD AUTO: 7 % (ref 4–12)
NEUTROPHILS # BLD AUTO: 21.39 THOUSANDS/ÂΜL (ref 1.85–7.62)
NEUTS SEG NFR BLD AUTO: 84 % (ref 43–75)
NRBC BLD AUTO-RTO: 0 /100 WBCS
PLATELET # BLD AUTO: 412 THOUSANDS/UL (ref 149–390)
PMV BLD AUTO: 10.5 FL (ref 8.9–12.7)
POTASSIUM SERPL-SCNC: 4.7 MMOL/L (ref 3.5–5.3)
RBC # BLD AUTO: 3.53 MILLION/UL (ref 3.88–5.62)
SODIUM SERPL-SCNC: 143 MMOL/L (ref 135–147)
WBC # BLD AUTO: 25.39 THOUSAND/UL (ref 4.31–10.16)

## 2025-04-06 PROCEDURE — 94762 N-INVAS EAR/PLS OXIMTRY CONT: CPT

## 2025-04-06 PROCEDURE — 94760 N-INVAS EAR/PLS OXIMETRY 1: CPT

## 2025-04-06 PROCEDURE — 94640 AIRWAY INHALATION TREATMENT: CPT

## 2025-04-06 PROCEDURE — 85027 COMPLETE CBC AUTOMATED: CPT

## 2025-04-06 PROCEDURE — 80048 BASIC METABOLIC PNL TOTAL CA: CPT

## 2025-04-06 PROCEDURE — 99232 SBSQ HOSP IP/OBS MODERATE 35: CPT | Performed by: INTERNAL MEDICINE

## 2025-04-06 PROCEDURE — 94003 VENT MGMT INPAT SUBQ DAY: CPT

## 2025-04-06 PROCEDURE — 94150 VITAL CAPACITY TEST: CPT

## 2025-04-06 RX ORDER — GLYCOPYRROLATE 0.2 MG/ML
0.1 INJECTION INTRAMUSCULAR; INTRAVENOUS EVERY 4 HOURS PRN
Status: DISCONTINUED | OUTPATIENT
Start: 2025-04-06 | End: 2025-04-07

## 2025-04-06 RX ORDER — ACETAMINOPHEN 10 MG/ML
1000 INJECTION, SOLUTION INTRAVENOUS ONCE
Status: COMPLETED | OUTPATIENT
Start: 2025-04-06 | End: 2025-04-06

## 2025-04-06 RX ORDER — LORAZEPAM 2 MG/ML
2 INJECTION INTRAMUSCULAR EVERY 6 HOURS PRN
Status: DISCONTINUED | OUTPATIENT
Start: 2025-04-06 | End: 2025-04-07 | Stop reason: HOSPADM

## 2025-04-06 RX ADMIN — CHLORHEXIDINE GLUCONATE 0.12% ORAL RINSE 15 ML: 1.2 LIQUID ORAL at 21:46

## 2025-04-06 RX ADMIN — IPRATROPIUM BROMIDE 0.5 MG: 0.5 SOLUTION RESPIRATORY (INHALATION) at 07:24

## 2025-04-06 RX ADMIN — BUDESONIDE 0.25 MG: 0.25 INHALANT RESPIRATORY (INHALATION) at 07:24

## 2025-04-06 RX ADMIN — ESCITALOPRAM OXALATE 5 MG: 10 TABLET ORAL at 08:24

## 2025-04-06 RX ADMIN — ACETAMINOPHEN 1000 MG: 10 INJECTION INTRAVENOUS at 10:27

## 2025-04-06 RX ADMIN — CARBIDOPA AND LEVODOPA 1 TABLET: 25; 100 TABLET ORAL at 08:24

## 2025-04-06 RX ADMIN — MORPHINE SULFATE 2 MG: 2 INJECTION, SOLUTION INTRAMUSCULAR; INTRAVENOUS at 10:48

## 2025-04-06 RX ADMIN — Medication 20 MG: at 08:24

## 2025-04-06 RX ADMIN — CHLORHEXIDINE GLUCONATE 0.12% ORAL RINSE 15 ML: 1.2 LIQUID ORAL at 08:24

## 2025-04-06 RX ADMIN — LEVALBUTEROL HYDROCHLORIDE 1.25 MG: 1.25 SOLUTION RESPIRATORY (INHALATION) at 07:24

## 2025-04-06 RX ADMIN — GLYCOPYRROLATE 0.1 MG: 0.2 INJECTION, SOLUTION INTRAMUSCULAR; INTRAVENOUS at 10:43

## 2025-04-06 RX ADMIN — POLYETHYLENE GLYCOL 3350 17 G: 17 POWDER, FOR SOLUTION ORAL at 08:24

## 2025-04-06 NOTE — PROGRESS NOTES
Progress Note - Internal Medicine   Name: Den Warren 84 y.o. male I MRN: [id number]  Unit/Bed#: ICU 04 I Date of Admission: 3/30/2025   Date of Service: 4/6/2025 I Hospital Day: 6    Assessment & Plan  Acute hypoxemic and hypercarbic respiratory failure (HCC)  Assessment:  Patient presented with progressive worsening of shortness of breath for 1 week from [facility].   In the ED, he was requiring BiPAP for increased work of breathing and hypoxemia.  Received ceftriaxone and azithromycin.  CT chest:  Large loculated right pleural effusion with dense consolidations within the RML and RLL.   Occlusion of the right bronchus intermedius.  Given continuous BiPAP requirement patient was admitted to the ICU.  Initial VBG was  7.30/60/30/31  3/31- given increased work of breathing on BiPAP a decision was made to intubate.  Patient was agreeable as well as his POA nephew.  SBT on 4/2 with high RSBI on pressure support 6/6  4/3-overnight, patient was maintained on pressure control 8/6 however he became tachypneic with tidal volumes of 150-160 and was switched back to volume control.    Plan:  Patient continues to become tachypneic on SBT trial on 4/5/2025  POA nephew and niece considering level 4 CODE STATUS change and removal of tube for today 4/6  Fentanyl as needed for sedation.  Xopenex/Atrovent  Airway clearance protocol.  Tube feeds at goal  Continue antibiotics as below.  Pneumonia  Patient has pneumonia secondary to aspiration with possible loculated parapneumonic effusion.  3/31- chest tube was placed and bronchoscopy for mucous plugging at bedside.  Pleural fluid is positive for lights criteria indicating exudative.  pH 7.4  Negative strep and Legionella.  Negative AFB  Positive bronchial culture also for group B beta-hemolytic Streptococcus sensitive to penicillin.    Plan:  De-escalated to ceftriaxone. Day #4. Anticipate a course of 2 to 4 weeks.  Total antibiotics day #7   Completed tPA and dornase  for chest tube.  Trend fever curve.  CBC daily.    Acute kidney injury superimposed on stage 2 chronic kidney disease (HCC)  Lab Results   Component Value Date    EGFR 30 04/06/2025    EGFR 33 04/05/2025    EGFR 33 04/04/2025    CREATININE 1.93 (H) 04/06/2025    CREATININE 1.81 (H) 04/05/2025    CREATININE 1.79 (H) 04/04/2025     Baseline creatinine (0.9-1.2)  PTA creatinine 1.92  BUN/creatinine ratio > 20  Likely ATN in the setting of this.    Plan:  Continue to monitor I's and O's  Daily BMP  Avoid nephrotoxic/hypotensive agents  (HFpEF) heart failure with preserved ejection fraction (HCC)  Wt Readings from Last 3 Encounters:   04/05/25 74.9 kg (165 lb 2 oz)   02/21/24 77.6 kg (171 lb)   01/03/24 77.6 kg (171 lb)     Echocardiogram this admission shows EF 73%.  Normal diastolic function.  Mild aortic stenosis.  Consider restarting Home torsemide with blood pressure parameters.  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HCC)  Continue home Sinemet  3 times a day through OG tube.   Pulmonary emphysema (HCC)  Last PFT 2020: Moderate obstructive lung disease with hyperinflation and low DLCO.  Home inhalers include Pulmicort, Spiriva, and albuterol.    GERD (gastroesophageal reflux disease)  Continue home Protonix  Depression  Continue OP Mirtazapine 15 mg daily at bedtime.  Continue home Lexapro.  Anemia  Hemoglobin dropping from 13.3 on admission to 9.2  Last hemoglobin in 6/2024 was 10.7.  No signs of bleeding.  The initial reading was likely secondary to dehydration.     Ambulatory dysfunction  Fall precautions        Subjective   Patient seen and examined. No acute events overnight.  Patient awake and resting comfortably in bed, remains intubated, able to respond by squeezing my fingers and nodding his head.  300 mL drained from chest tube overnight.  Goals of care discussion pending with POA nephew and milly, would like to be at bedside during transition to [facility] for comfort care and removal of ET tube.     Objective :  Temp:   [98.2 °F (36.8 °C)-100.8 °F (38.2 °C)] 98.7 °F (37.1 °C)  HR:  [] 80  BP: ()/(51-68) 111/57  Resp:  [26-59] 37  SpO2:  [96 %-99 %] 99 %  O2 Device: Ventilator  FiO2 (%):  [50] 50    I/O         04/04 0701 04/05 0700 04/05 0701 04/06 0700 04/06 0701  04/07 0700    P.O. 0 540     I.V. (mL/kg)  50 (0.7)     NG/GT 65 185     Feedings 998 1408     Total Intake(mL/kg) 1063 (14.2) 2183 (29.1)     Urine (mL/kg/hr) 1160 (0.6) 1305 (0.7)     Stool  0     Chest Tube 670 830     Total Output 1830 2135     Net -767 +48            Unmeasured Stool Occurrence  5 x           Weights:   IBW (Ideal Body Weight): 77.6 kg    Body mass index is 22.39 kg/m².  Weight (last 2 days)       Date/Time Weight    04/05/25 0600 74.9 (165.12)    04/04/25 0600 78.7 (173.5)            Physical Exam  Constitutional:       General: He is not in acute distress.     Appearance: He is ill-appearing.   Cardiovascular:      Rate and Rhythm: Regular rhythm. Tachycardia present.   Pulmonary:      Effort: Respiratory distress present.      Breath sounds: Rhonchi present. No wheezing or rales.   Musculoskeletal:      Right lower leg: No edema.      Left lower leg: No edema.   Skin:     Coloration: Skin is pale.   Neurological:      General: No focal deficit present.           Lab Results: I have reviewed the following results:  Recent Labs     04/06/25  0510   WBC 25.39*   HGB 9.6*   HCT 30.5*   *   SODIUM 143   K 4.7   *   CO2 31   BUN 63*   CREATININE 1.93*   GLUC 138       Imaging Results Review: I reviewed radiology reports from this admission including: chest xray.  Other Study Results Review: Pathology reports reviewed.    Currently Ordered Meds:   Current Facility-Administered Medications:     acetaminophen (TYLENOL) oral suspension 975 mg, Q6H PRN    budesonide (PULMICORT) inhalation solution 0.25 mg, BID    carbidopa-levodopa (SINEMET)  mg per tablet 1 tablet, TID    cefTRIAXone (ROCEPHIN) 2,000 mg in dextrose 5 % 50  mL IVPB, Q24H, Last Rate: Stopped (04/05/25 1802)    chlorhexidine (PERIDEX) 0.12 % oral rinse 15 mL, Q12H LINDA    escitalopram (LEXAPRO) tablet 5 mg, Daily    fentaNYL injection 50 mcg, Q4H PRN    ipratropium (ATROVENT) 0.02 % inhalation solution 0.5 mg, TID    levalbuterol (XOPENEX) inhalation solution 1.25 mg, TID    melatonin tablet 3 mg, HS    mirtazapine (REMERON) tablet 15 mg, HS    omeprazole (PRILOSEC) suspension 2 mg/mL, Daily    polyethylene glycol (MIRALAX) packet 17 g, Daily    rOPINIRole (REQUIP) tablet 0.5 mg, HS    senna-docusate sodium (SENOKOT S) 8.6-50 mg per tablet 1 tablet, HS  VTE Pharmacologic Prophylaxis: Previously on Heparin but has been discontinued.  VTE Mechanical Prophylaxis: sequential compression device    Administrative Statements     Portions of the record may have been created with voice recognition software.

## 2025-04-06 NOTE — ASSESSMENT & PLAN NOTE
Lab Results   Component Value Date    EGFR 30 04/06/2025    EGFR 33 04/05/2025    EGFR 33 04/04/2025    CREATININE 1.93 (H) 04/06/2025    CREATININE 1.81 (H) 04/05/2025    CREATININE 1.79 (H) 04/04/2025     Baseline creatinine (0.9-1.2)  PTA creatinine 1.92  BUN/creatinine ratio > 20  Likely ATN in the setting of this.    Plan:  Continue to monitor I's and O's  Daily BMP  Avoid nephrotoxic/hypotensive agents

## 2025-04-06 NOTE — ASSESSMENT & PLAN NOTE
Assessment:  Patient presented with progressive worsening of shortness of breath for 1 week from [facility].   In the ED, he was requiring BiPAP for increased work of breathing and hypoxemia.  Received ceftriaxone and azithromycin.  CT chest:  Large loculated right pleural effusion with dense consolidations within the RML and RLL.   Occlusion of the right bronchus intermedius.  Given continuous BiPAP requirement patient was admitted to the ICU.  Initial VBG was  7.30/60/30/31  3/31- given increased work of breathing on BiPAP a decision was made to intubate.  Patient was agreeable as well as his POA nephew.  SBT on 4/2 with high RSBI on pressure support 6/6  4/3-overnight, patient was maintained on pressure control 8/6 however he became tachypneic with tidal volumes of 150-160 and was switched back to volume control.    Plan:  Patient continues to become tachypneic on SBT trial on 4/5/2025  POA nephew and niece considering level 4 CODE STATUS change and removal of tube for today 4/6  Fentanyl as needed for sedation.  Xopenex/Atrovent  Airway clearance protocol.  Tube feeds at goal  Continue antibiotics as below.

## 2025-04-06 NOTE — ASSESSMENT & PLAN NOTE
Patient has pneumonia secondary to aspiration with possible loculated parapneumonic effusion.  3/31- chest tube was placed and bronchoscopy for mucous plugging at bedside.  Pleural fluid is positive for lights criteria indicating exudative.  pH 7.4  Negative strep and Legionella.  Negative AFB  Positive bronchial culture also for group B beta-hemolytic Streptococcus sensitive to penicillin.    Plan:  De-escalated to ceftriaxone. Day #4. Anticipate a course of 2 to 4 weeks.  Total antibiotics day #7   Completed tPA and dornase for chest tube.  Trend fever curve.  CBC daily.

## 2025-04-06 NOTE — ASSESSMENT & PLAN NOTE
Wt Readings from Last 3 Encounters:   04/05/25 74.9 kg (165 lb 2 oz)   02/21/24 77.6 kg (171 lb)   01/03/24 77.6 kg (171 lb)     Echocardiogram this admission shows EF 73%.  Normal diastolic function.  Mild aortic stenosis.  Consider restarting Home torsemide with blood pressure parameters.

## 2025-04-06 NOTE — PLAN OF CARE
Problem: Prexisting or High Potential for Compromised Skin Integrity  Goal: Skin integrity is maintained or improved  Description: INTERVENTIONS:- Identify patients at risk for skin breakdown- Assess and monitor skin integrity- Assess and monitor nutrition and hydration status- Monitor labs - Assess for incontinence - Turn and reposition patient- Assist with mobility/ambulation- Relieve pressure over bony prominences- Avoid friction and shearing- Provide appropriate hygiene as needed including keeping skin clean and dry- Evaluate need for skin moisturizer/barrier cream- Collaborate with interdisciplinary team - Patient/family teaching- Consider wound care consult   Outcome: Progressing     Problem: SAFETY ADULT  Goal: Patient will remain free of falls  Description: INTERVENTIONS:- Educate patient/family on patient safety including physical limitations- Instruct patient to call for assistance with activity - Consult OT/PT to assist with strengthening/mobility - Keep Call bell within reach- Keep bed low and locked with side rails adjusted as appropriate- Keep care items and personal belongings within reach- Initiate and maintain comfort rounds- Make Fall Risk Sign visible to staff-  Apply yellow socks and bracelet for high fall risk patients- Consider moving patient to room near nurses station  INTERVENTIONS:- Educate patient/family on patient safety including physical limitations- Instruct patient to call for assistance with activity - Consult OT/PT to assist with strengthening/mobility - Keep Call bell within reach- Keep bed low and locked with side rails adjusted as appropriate- Keep care items and personal belongings within reach- Initiate and maintain comfort rounds- Make Fall Risk Sign visible to staff- Apply yellow socks and bracelet for high fall risk patients- Consider moving patient to room near nurses station  Outcome: Progressing  Goal: Maintain or return to baseline ADL function  Description:  INTERVENTIONS:-  Assess patient's ability to carry out ADLs; assess patient's baseline for ADL function and identify physical deficits which impact ability to perform ADLs (bathing, care of mouth/teeth, toileting, grooming, dressing, etc.)- Assess/evaluate cause of self-care deficits - Assess range of motion- Assess patient's mobility; develop plan if impaired- Assess patient's need for assistive devices and provide as appropriate- Encourage maximum independence but intervene and supervise when necessary- Involve family in performance of ADLs- Assess for home care needs following discharge - Consider OT consult to assist with ADL evaluation and planning for discharge- Provide patient education as appropriate  INTERVENTIONS:-  Assess patient's ability to carry out ADLs; assess patient's baseline for ADL function and identify physical deficits which impact ability to perform ADLs (bathing, care of mouth/teeth, toileting, grooming, dressing, etc.)- Assess/evaluate cause of self-care deficits - Assess range of motion- Assess patient's mobility; develop plan if impaired- Assess patient's need for assistive devices and provide as appropriate- Encourage maximum independence but intervene and supervise when necessary- Involve family in performance of ADLs- Assess for home care needs following discharge - Consider OT consult to assist with ADL evaluation and planning for discharge- Provide patient education as appropriate  Outcome: Progressing  Goal: Maintains/Returns to pre admission functional level  Description: INTERVENTIONS:- Perform AM-PAC 6 Click Basic Mobility/ Daily Activity assessment daily.- Set and communicate daily mobility goal to care team and patient/family/caregiver. - Collaborate with rehabilitation services on mobility goals if consulted- Out of bed for toileting- Record patient progress and toleration of activity level   INTERVENTIONS:- Perform AM-PAC 6 Click Basic Mobility/ Daily Activity assessment  daily.- Set and communicate daily mobility goal to care team and patient/family/caregiver. - Collaborate with rehabilitation services on mobility goals if consulted- Out of bed for toileting- Record patient progress and toleration of activity level   Outcome: Progressing     Problem: DISCHARGE PLANNING  Goal: Discharge to home or other facility with appropriate resources  Description: INTERVENTIONS:- Identify barriers to discharge w/patient and caregiver- Arrange for needed discharge resources and transportation as appropriate- Identify discharge learning needs (meds, wound care, etc.)- Arrange for interpretive services to assist at discharge as needed- Refer to Case Management Department for coordinating discharge planning if the patient needs post-hospital services based on physician/advanced practitioner order or complex needs related to functional status, cognitive ability, or social support system  INTERVENTIONS:- Identify barriers to discharge w/patient and caregiver- Arrange for needed discharge resources and transportation as appropriate- Identify discharge learning needs (meds, wound care, etc.)- Arrange for interpretive services to assist at discharge as needed- Refer to Case Management Department for coordinating discharge planning if the patient needs post-hospital services based on physician/advanced practitioner order or complex needs related to functional status, cognitive ability, or social support system  Outcome: Progressing     Problem: Knowledge Deficit  Goal: Patient/family/caregiver demonstrates understanding of disease process, treatment plan, medications, and discharge instructions  Description: Complete learning assessment and assess knowledge base.Interventions:- Provide teaching at level of understanding- Provide teaching via preferred learning methods  Complete learning assessment and assess knowledge base.Interventions:- Provide teaching at level of understanding- Provide teaching via  preferred learning methods  Outcome: Progressing     Problem: PAIN - ADULT  Goal: Verbalizes/displays adequate comfort level or baseline comfort level  Description: Interventions:- Encourage patient to monitor pain and request assistance- Assess pain using appropriate pain scale- Administer analgesics based on type and severity of pain and evaluate response- Implement non-pharmacological measures as appropriate and evaluate response- Consider cultural and social influences on pain and pain management- Notify physician/advanced practitioner if interventions unsuccessful or patient reports new pain  Outcome: Progressing     Problem: INFECTION - ADULT  Goal: Absence or prevention of progression during hospitalization  Description: INTERVENTIONS:- Assess and monitor for signs and symptoms of infection- Monitor lab/diagnostic results- Monitor all insertion sites, i.e. indwelling lines, tubes, and drains- Monitor endotracheal if appropriate and nasal secretions for changes in amount and color- Preston appropriate cooling/warming therapies per order- Administer medications as ordered- Instruct and encourage patient and family to use good hand hygiene technique- Identify and instruct in appropriate isolation precautions for identified infection/condition  Outcome: Progressing     Problem: RESPIRATORY - ADULT  Goal: Achieves optimal ventilation and oxygenation  Description: INTERVENTIONS:- Assess for changes in respiratory status- Assess for changes in mentation and behavior- Position to facilitate oxygenation and minimize respiratory effort- Oxygen administered by appropriate delivery if ordered- Initiate smoking cessation education as indicated- Encourage broncho-pulmonary hygiene including cough, deep breathe, Incentive Spirometry- Assess the need for suctioning and aspirate as needed- Assess and instruct to report SOB or any respiratory difficulty- Respiratory Therapy support as indicated  Outcome: Progressing      Problem: Nutrition/Hydration-ADULT  Goal: Nutrient/Hydration intake appropriate for improving, restoring or maintaining nutritional needs  Description: Monitor and assess patient's nutrition/hydration status for malnutrition. Collaborate with interdisciplinary team and initiate plan and interventions as ordered.  Monitor patient's weight and dietary intake as ordered or per policy. Utilize nutrition screening tool and intervene as necessary. Determine patient's food preferences and provide high-protein, high-caloric foods as appropriate. INTERVENTIONS:- Monitor oral intake, urinary output, labs, and treatment plans- Assess nutrition and hydration status and recommend course of action- Evaluate amount of meals eaten- Assist patient with eating if necessary - Allow adequate time for meals- Recommend/ encourage appropriate diets, oral nutritional supplements, and vitamin/mineral supplements- Order, calculate, and assess calorie counts as needed- Recommend, monitor, and adjust tube feedings and TPN/PPN based on assessed needs- Assess need for intravenous fluids- Provide specific nutrition/hydration education as appropriate- Include patient/family/caregiver in decisions related to nutrition  Monitor and assess patient's nutrition/hydration status for malnutrition. Collaborate with interdisciplinary team and initiate plan and interventions as ordered.  Monitor patient's weight and dietary intake as ordered or per policy. Utilize nutrition screening tool and intervene as necessary. Determine patient's food preferences and provide high-protein, high-caloric foods as appropriate. INTERVENTIONS:- Monitor oral intake, urinary output, labs, and treatment plans- Assess nutrition and hydration status and recommend course of action- Evaluate amount of meals eaten- Assist patient with eating if necessary - Allow adequate time for meals- Recommend/ encourage appropriate diets, oral nutritional supplements, and vitamin/mineral  supplements- Order, calculate, and assess calorie counts as needed- Recommend, monitor, and adjust tube feedings and TPN/PPN based on assessed needs- Assess need for intravenous fluids- Provide specific nutrition/hydration education as appropriate- Include patient/family/caregiver in decisions related to nutrition  Outcome: Progressing     Problem: COPING  Goal: Pt/Family able to verbalize concerns and demonstrate effective coping strategies  Description: INTERVENTIONS:- Assist patient/family to identify coping skills, available support systems and cultural and spiritual values- Provide emotional support, including active listening and acknowledgement of concerns of patient and caregivers- Reduce environmental stimuli, as able- Provide patient education- Assess for spiritual pain/suffering and initiate spiritual care, including notification of Pastoral Care or randal based community as needed- Assess effectiveness of coping strategies  Outcome: Progressing  Goal: Will report anxiety at manageable levels  Description: INTERVENTIONS:- Administer medication as ordered- Teach and encourage coping skills- Provide emotional support- Assess patient/family for anxiety and ability to cope  Outcome: Progressing     Problem: SAFETY,RESTRAINT: NV/NON-SELF DESTRUCTIVE BEHAVIOR  Goal: Remains free of harm/injury (restraint for non violent/non self-detsructive behavior)  Description: INTERVENTIONS:- Instruct patient/family regarding restraint use - Assess and monitor physiologic and psychological status - Provide interventions and comfort measures to meet assessed patient needs - Identify and implement measures to help patient regain control- Assess readiness for release of restraint   Outcome: Progressing  Goal: Returns to optimal restraint-free functioning  Description: INTERVENTIONS:- Assess the patient's behavior and symptoms that indicate continued need for restraint- Identify and implement measures to help patient regain  control- Assess readiness for release of restraint   Outcome: Progressing

## 2025-04-07 ENCOUNTER — HOME CARE VISIT (OUTPATIENT)
Dept: HOME HEALTH SERVICES | Facility: HOME HEALTHCARE | Age: 85
End: 2025-04-07

## 2025-04-07 VITALS
HEART RATE: 97 BPM | DIASTOLIC BLOOD PRESSURE: 48 MMHG | SYSTOLIC BLOOD PRESSURE: 99 MMHG | TEMPERATURE: 100 F | WEIGHT: 165.12 LBS | BODY MASS INDEX: 22.37 KG/M2 | HEIGHT: 72 IN | OXYGEN SATURATION: 78 % | RESPIRATION RATE: 30 BRPM

## 2025-04-07 LAB
FUNGUS SPEC CULT: NORMAL
SARS-COV-2 RNA RESP QL NAA+PROBE: NEGATIVE

## 2025-04-07 PROCEDURE — 99238 HOSP IP/OBS DSCHRG MGMT 30/<: CPT | Performed by: INTERNAL MEDICINE

## 2025-04-07 PROCEDURE — 87635 SARS-COV-2 COVID-19 AMP PRB: CPT

## 2025-04-07 PROCEDURE — NC001 PR NO CHARGE: Performed by: INTERNAL MEDICINE

## 2025-04-07 RX ORDER — MORPHINE SULFATE 20 MG/ML
10 SOLUTION ORAL EVERY 2 HOUR PRN
Refills: 0 | Status: CANCELLED | OUTPATIENT
Start: 2025-04-07 | End: 2025-04-17

## 2025-04-07 RX ORDER — GLYCOPYRROLATE 0.2 MG/ML
0.2 INJECTION INTRAMUSCULAR; INTRAVENOUS EVERY 4 HOURS PRN
Status: DISCONTINUED | OUTPATIENT
Start: 2025-04-07 | End: 2025-04-07 | Stop reason: HOSPADM

## 2025-04-07 RX ADMIN — GLYCOPYRROLATE 0.1 MG: 0.2 INJECTION, SOLUTION INTRAMUSCULAR; INTRAVENOUS at 18:01

## 2025-04-07 RX ADMIN — MORPHINE SULFATE 2 MG: 2 INJECTION, SOLUTION INTRAMUSCULAR; INTRAVENOUS at 20:06

## 2025-04-07 RX ADMIN — MORPHINE SULFATE 2 MG: 2 INJECTION, SOLUTION INTRAMUSCULAR; INTRAVENOUS at 11:56

## 2025-04-07 RX ADMIN — GLYCOPYRROLATE 0.2 MG: 0.2 INJECTION, SOLUTION INTRAMUSCULAR; INTRAVENOUS at 20:07

## 2025-04-07 RX ADMIN — LORAZEPAM 2 MG: 2 INJECTION INTRAMUSCULAR; INTRAVENOUS at 18:01

## 2025-04-07 NOTE — CASE MANAGEMENT
Case Management Discharge Planning Note    Patient name Den Warren  Location ICU 04/ICU 04 MRN [id number]  : 1940 Date 2025       Current Admission Date: 3/30/2025  Current Admission Diagnosis:Acute hypoxemic and hypercarbic respiratory failure (HCC)   Patient Active Problem List    Diagnosis Date Noted Date Diagnosed    Anemia 2025     Acute kidney injury superimposed on stage 2 chronic kidney disease (HCC) 2025     Pleural effusion 2025     Acute hypoxemic and hypercarbic respiratory failure (HCC) 2025     Pneumonia 2025     GERD (gastroesophageal reflux disease) 2025     (HFpEF) heart failure with preserved ejection fraction (HCC) 2025     Lactate blood increase 2025     Pulmonary emphysema, unspecified emphysema type (HCC) 2024     Other constipation 10/25/2023     Nausea 2023     Lung nodule 2023 06/15/2023    Grade I diastolic dysfunction 2023     Venous insufficiency (chronic) (peripheral) 10/19/2022     Peripheral edema 10/14/2022     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[facility]) 2022     Depression 2022     Ambulatory dysfunction 2022     Acute exacerbation of chronic obstructive pulmonary disease (COPD) ([facility]) 2022     Hemorrhoids 2022     BPH (benign prostatic hyperplasia) 2022     Anxiety 2022     Pulmonary emphysema (HCC) 2022     Anal sphincter incompetence 2022     Former smoker 2020       LOS (days): 7  Geometric Mean LOS (GMLOS) (days): 4.9  Days to GMLOS:-2.5     OBJECTIVE:  Risk of Unplanned Readmission Score: 13.44         Current admission status: Inpatient   Preferred Pharmacy:   [facility] [place] [address]  [address] [place] [address]  Phone: [phone] Fax: [phone]    Primary Care Provider: No primary care provider on file.    Primary Insurance: MEDICARE  Secondary Insurance: [employer]  LIFE    DISCHARGE DETAILS:    Discharge planning discussed with:: Michi lim  Freedom of Choice: Yes  Comments - [place]inna of Choice: Hospice - [facility]  CM contacted family/caregiver?: Yes  Were Treatment Team discharge recommendations reviewed with patient/caregiver?: Yes  Did patient/caregiver verbalize understanding of patient care needs?: N/A- going to facility  Were patient/caregiver advised of the risks associated with not following Treatment Team discharge recommendations?: Yes    Contacts  Patient Contacts: Keo Warrenith (Nephew)  [phone]  Contact Method: Phone  Phone Number: [phone]  Reason/Outcome: Continuity of Care, Emergency Contact, Discharge Planning    Requested Home Health Care         Is the patient interested in HHC at discharge?: No    DME Referral Provided  Referral made for DME?: No    Other Referral/Resources/Interventions Provided:  Interventions: Hospice  Referral Comments: Aidin referral for Hospice       Treatment Team Recommendation: Hospice  Discharge Destination Plan:: Hospice  Transport at Discharge : [facility]      Additional Comments: CM spoke with the patient's nephew Michi Warren regarding the hospice consultation CM received. Michi is ok with which ever hospice service referred to. CM reached out to [facility] the patient's LTC facility to determine if the patient can return with Hospice. CM left a detailed VM for the East 4 unit. The message entails the reason for the call and CM callback number for return call. Aidin referral placed for Hospice service. CM will continue to follow for discharge planning.

## 2025-04-07 NOTE — ASSESSMENT & PLAN NOTE
Assessment:  Presented with progressive worsening of shortness of breath for 1 week from [facility].   In the ED, he was requiring BiPAP for increased work of breathing and hypoxemia.  Received ceftriaxone and azithromycin.  CT chest:  Large loculated right pleural effusion with dense consolidations within the RML and RLL.   Occlusion of the right bronchus intermedius.  Required continuous BiPAP requirement patient was admitted to the ICU.  Initial VBG was  7.30/60/30/31  3/31- given increased work of breathing on BiPAP a decision was made to intubate.  Patient was agreeable as well as his POA nephew.  SBT on 4/2 with high RSBI on pressure support 6/6  4/3-overnight, patient was maintained on pressure control 8/6 however he became tachypneic with tidal volumes of 150-160 and was switched back to volume control.  4/6/2025: Extubated to comfort care. Now   Weaned off oxygen prior to discharge    Plan:   level 4 CODE STATUS change and extubated on  4/6  Continue with pain management

## 2025-04-07 NOTE — ASSESSMENT & PLAN NOTE
Wt Readings from Last 3 Encounters:   04/05/25 74.9 kg (165 lb 2 oz)   02/21/24 77.6 kg (171 lb)   01/03/24 77.6 kg (171 lb)     Echocardiogram this admission shows EF 73%.  Normal diastolic function.  Mild aortic stenosis.

## 2025-04-07 NOTE — ASSESSMENT & PLAN NOTE
Patient has pneumonia secondary to aspiration with possible loculated parapneumonic effusion.  3/31- chest tube was placed and bronchoscopy for mucous plugging at bedside.  Pleural fluid is positive for lights criteria indicating exudative.  pH 7.4  Negative strep and Legionella.  Negative AFB  Positive bronchial culture also for group B beta-hemolytic Streptococcus sensitive to penicillin.    Plan:  Comfort care.

## 2025-04-07 NOTE — PROGRESS NOTES
Progress Note - Critical Care/ICU   Name: Den Warren 84 y.o. male I MRN: [id number]  Unit/Bed#: ICU 04 I Date of Admission: 3/30/2025   Date of Service: 4/7/2025 I Hospital Day: 7      Assessment & Plan  Acute hypoxemic and hypercarbic respiratory failure (HCC)  Assessment:  Presented with progressive worsening of shortness of breath for 1 week from [facility].   In the ED, he was requiring BiPAP for increased work of breathing and hypoxemia.  Received ceftriaxone and azithromycin.  CT chest:  Large loculated right pleural effusion with dense consolidations within the RML and RLL.   Occlusion of the right bronchus intermedius.  Given continuous BiPAP requirement patient was admitted to the ICU.  Initial VBG was  7.30/60/30/31  3/31- given increased work of breathing on BiPAP a decision was made to intubate.  Patient was agreeable as well as his POA nephew.  SBT on 4/2 with high RSBI on pressure support 6/6  4/3-overnight, patient was maintained on pressure control 8/6 however he became tachypneic with tidal volumes of 150-160 and was switched back to volume control.  4/6/2025: Extubated to comfort care. Now   Currently on 5 L midflow    Plan:   level 4 CODE STATUS change and extubated on  4/6  Continue with pain management   Pneumonia  Patient has pneumonia secondary to aspiration with possible loculated parapneumonic effusion.  3/31- chest tube was placed and bronchoscopy for mucous plugging at bedside.  Pleural fluid is positive for lights criteria indicating exudative.  pH 7.4  Negative strep and Legionella.  Negative AFB  Positive bronchial culture also for group B beta-hemolytic Streptococcus sensitive to penicillin.    Plan:  Comfort care.  If stable can be transfer to hosp[facility]     Acute kidney injury superimposed on stage 2 chronic kidney disease (HCC)  Lab Results   Component Value Date    EGFR 30 04/06/2025    EGFR 33 04/05/2025    EGFR 33 04/04/2025    CREATININE 1.93 (H)  04/06/2025    CREATININE 1.81 (H) 04/05/2025    CREATININE 1.79 (H) 04/04/2025     Baseline creatinine (0.9-1.2)  PTA creatinine 1.92  BUN/creatinine ratio > 20  Likely ATN in the setting of this.      (HFpEF) heart failure with preserved ejection fraction (HCC)  Wt Readings from Last 3 Encounters:   04/05/25 74.9 kg (165 lb 2 oz)   02/21/24 77.6 kg (171 lb)   01/03/24 77.6 kg (171 lb)     Echocardiogram this admission shows EF 73%.  Normal diastolic function.  Mild aortic stenosis.    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HCC)  Plan to remove OG tube.   Pulmonary emphysema (HCC)  Last PFT 2020: Moderate obstructive lung disease with hyperinflation and low DLCO.  Home inhalers include Pulmicort, Spiriva, and albuterol.    GERD (gastroesophageal reflux disease)  Continue home Protonix  Depression  Comfort measures   Anemia  Hemoglobin dropping from 13.3 on admission to 9.2  Last hemoglobin in 6/2024 was 10.7.  No signs of bleeding.  The initial reading was likely secondary to dehydration.     Ambulatory dysfunction  Fall precautions    Disposition: Med Surg    ICU Core Measures     A: Assess, Prevent, and Manage Pain Has pain been assessed? Yes  Need for changes to pain regimen? No   B: Both SAT/SAT  N/A   C: Choice of Sedation   Need for changes to sedation or analgesia regimen? No   D: Delirium CAM-ICU: Unable to perform secondary to Dementia or other chronic cognitive dysfunction   E: Early Mobility  Plan for early mobility? NA   F: Family Engagement Plan for family engagement today? Yes       Review of Invasive Devices:    Singer Plan: end of life care        Prophylaxis:  VTE Contraindicated secondary to: Comfort care   Stress Ulcer  not orderedcovered byomeprazole (PriLOSEC) 20 mg delayed release capsule [[id number]] (Long-Term Med)         24 Hour Events : Patient extubated and transitioned to comfort care on 4/6, OG tube remains in place. Currently on midflow.   Subjective   This morning, patient was resting peaceful. He had  episodes of resting tremors. Unable to access ROS     Objective :                   Vitals I/O      Most Recent Min/Max in 24hrs   Temp 100 °F (37.8 °C) Temp  Min: 100 °F (37.8 °C)  Max: 100.4 °F (38 °C)   Pulse 82 Pulse  Min: 82  Max: 102   Resp (!) 28 Resp  Min: 28  Max: 47   /54 BP  Min: 112/54  Max: 120/67   O2 Sat 100 % SpO2  Min: 97 %  Max: 100 %      Intake/Output Summary (Last 24 hours) at 4/7/2025 0709  Last data filed at 4/6/2025 2101  Gross per 24 hour   Intake --   Output 900 ml   Net -900 ml       No diet orders on file    Invasive Monitoring           Physical Exam   Physical Exam  Skin:     General: Skin is warm and dry.   HENT:      Head: Normocephalic and atraumatic.      Mouth/Throat:      Mouth: Mucous membranes are moist.   Abdominal:      Palpations: Abdomen is soft.   Constitutional:       Appearance: He is ill-appearing.   Pulmonary:      Breath sounds: Rhonchi present.      Comments: Midflow at 5L    Genitourinary/Anorectal:  Singer present.        Diagnostic Studies        Lab Results: I have reviewed the following results:     Medications:  Scheduled PRN   chlorhexidine, 15 mL, Q12H LINDA      fentaNYL, 50 mcg, Q4H PRN  glycopyrrolate, 0.1 mg, Q4H PRN  LORazepam, 2 mg, Q6H PRN  morphine injection, 2 mg, Q4H PRN       Continuous          Labs:   CBC    Recent Labs     04/06/25  0510   WBC 25.39*   HGB 9.6*   HCT 30.5*   *     BMP    Recent Labs     04/06/25  0510   SODIUM 143   K 4.7   *   CO2 31   AGAP 3*   BUN 63*   CREATININE 1.93*   CALCIUM 8.2*       Coags    No recent results     Additional Electrolytes  No recent results       Blood Gas    Recent Labs     04/05/25  1157   PHART 7.478*   OXO2ASE 38.9   PO2ART 106.8   BUP6VBS 28.2*   BEART 4.4   SOURCE Radial, Left     Recent Labs     04/05/25  1157   SOURCE Radial, Left    LFTs  No recent results    Infectious  No recent results  Glucose  Recent Labs     04/06/25  0510   GLUC 138

## 2025-04-07 NOTE — ASSESSMENT & PLAN NOTE
Lab Results   Component Value Date    EGFR 30 04/06/2025    EGFR 33 04/05/2025    EGFR 33 04/04/2025    CREATININE 1.93 (H) 04/06/2025    CREATININE 1.81 (H) 04/05/2025    CREATININE 1.79 (H) 04/04/2025     Baseline creatinine (0.9-1.2)  PTA creatinine 1.92  BUN/creatinine ratio > 20  Likely ATN in the setting of this.

## 2025-04-07 NOTE — PROGRESS NOTES
Patient:  VEGA LYNN    MRN:  [id number]    Aidin Request ID:  [id number]    Level of care reserved:  Hospice    Partner Reserved:  [facility], [place] [address] [phone]    Clinical needs requested:    Geography searched:  [id number]    Start of Service:    Request sent:  10:22am EDT on 4/7/2025 by Yoli Boone    Partner reserved:  4:49pm EDT on 4/7/2025 by Yoli Boone    Choice list shared:

## 2025-04-07 NOTE — CASE MANAGEMENT
Case Management Discharge Planning Note    Patient name Den Warren  Location ICU 04/ICU 04 MRN [id number]  : 1940 Date 2025       Current Admission Date: 3/30/2025  Current Admission Diagnosis:Acute hypoxemic and hypercarbic respiratory failure (HCC)   Patient Active Problem List    Diagnosis Date Noted Date Diagnosed    Anemia 2025     Acute kidney injury superimposed on stage 2 chronic kidney disease (HCC) 2025     Pleural effusion 2025     Acute hypoxemic and hypercarbic respiratory failure (HCC) 2025     Pneumonia 2025     GERD (gastroesophageal reflux disease) 2025     (HFpEF) heart failure with preserved ejection fraction (HCC) 2025     Lactate blood increase 2025     Pulmonary emphysema, unspecified emphysema type (HCC) 2024     Other constipation 10/25/2023     Nausea 2023     Lung nodule 2023 06/15/2023    Grade I diastolic dysfunction 2023     Venous insufficiency (chronic) (peripheral) 10/19/2022     Peripheral edema 10/14/2022     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     Depression 2022     Ambulatory dysfunction 2022     Acute exacerbation of chronic obstructive pulmonary disease (COPD) ([facility]) 2022     Hemorrhoids 2022     BPH (benign prostatic hyperplasia) 2022     Anxiety 2022     Pulmonary emphysema (HCC) 2022     Anal sphincter incompetence 2022     Former smoker 2020       LOS (days): 7  Geometric Mean LOS (GMLOS) (days): 4.9  Days to GMLOS:-2.7     OBJECTIVE:  Risk of Unplanned Readmission Score: 13.18         Current admission status: Inpatient   Preferred Pharmacy:   [facility] [place] [address]  [address] [place] [address]  Phone: [phone] Fax: [phone]    Primary Care Provider: No primary care provider on file.    Primary Insurance: MEDICARE  Secondary Insurance: [employer]  LIFE    DISCHARGE DETAILS:         Other Referral/Resources/Interventions Provided:  Referral Comments: [facility] service       Treatment Team Recommendation: Facility Return, Hospice  Discharge Destination Plan:: Hospice, Facility Return  Transport at Discharge : [facility]     Number/Name of Dispatcher: JOSÉ MANUEL [phone]     ETA of Transport (Date): 04/07/25  ETA of Transport (Time): 1700     Additional Comments: Roundtrip transportation scheduled for 5 pm. Facility notified and is in agreement with the transportation arrangement.    Accepting Facility Name, City & [place] : [place] [address]  Receiving Facility/Agency Phone Number: [phone]  Facility/Agency Fax Number: Fax: [phone]

## 2025-04-07 NOTE — PROGRESS NOTES
Patient:  VEGA LYNN    MRN:  [id number]    Aidin Request ID:  [id number]    Level of care reserved:  Hospice    Partner Reserved:  Pending sale to [facility], [place] [address] [phone]    Clinical needs requested:    Geography searched:  [id number]    Start of Service:    Request sent:  10:22am EDT on 4/7/2025 by Yoli Boone    Partner reserved:  11:26am EDT on 4/7/2025 by Yoli Boone    Choice list shared:

## 2025-04-07 NOTE — CASE MANAGEMENT
Case Management Discharge Planning Note    Patient name Den Warren  Location ICU 04/ICU 04 MRN [id number]  : 1940 Date 2025       Current Admission Date: 3/30/2025  Current Admission Diagnosis:Acute hypoxemic and hypercarbic respiratory failure (HCC)   Patient Active Problem List    Diagnosis Date Noted Date Diagnosed    Anemia 2025     Acute kidney injury superimposed on stage 2 chronic kidney disease (HCC) 2025     Pleural effusion 2025     Acute hypoxemic and hypercarbic respiratory failure (HCC) 2025     Pneumonia 2025     GERD (gastroesophageal reflux disease) 2025     (HFpEF) heart failure with preserved ejection fraction (HCC) 2025     Lactate blood increase 2025     Pulmonary emphysema, unspecified emphysema type (HCC) 2024     Other constipation 10/25/2023     Nausea 2023     Lung nodule 2023 06/15/2023    Grade I diastolic dysfunction 2023     Venous insufficiency (chronic) (peripheral) 10/19/2022     Peripheral edema 10/14/2022     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) 2022     Hemorrhoids 2022     BPH (benign prostatic hyperplasia) 2022     Anxiety 2022     Pulmonary emphysema (HCC) 2022     Anal sphincter incompetence 2022     Former smoker 2020       LOS (days): 7  Geometric Mean LOS (GMLOS) (days): 4.9  Days to GMLOS:-2.5     OBJECTIVE:  Risk of Unplanned Readmission Score: 13.44         Current admission status: Inpatient   Preferred Pharmacy:   [facility] [place] [address]  [address] [place] [address]  Phone: [phone] Fax: [phone]    Primary Care Provider: No primary care provider on file.    Primary Insurance: MEDICARE  Secondary Insurance: [employer]  LIFE    DISCHARGE DETAILS:    Discharge planning discussed with:: Nephmargi Crowleyith and Ela from [facility]        Additional Comments: YULIET spoke with Ela from [facility] regarding the patient's return to the facility on Hospice service. The facility will be able to accept the patient back on Hospice service but the patient will need to have a negative COVID test. YULIET spoke with the ICU provider regarding getting the test ordered. CM reached out to the patient's nephew Michi to inform him that the facility will receive the patient back under hospice service but they work with [facility] service, CM inquired if it was ok if the patient be cared fared for by the agency the facility uses. Michi informed CM that he would like to speak with his uncle regarding his wishes and will let care manger know. CM will meet with Michi later today when he comes in to see the patient. CM department will continue to follow the patient through hospital discharge

## 2025-04-07 NOTE — ASSESSMENT & PLAN NOTE
Assessment:  Presented with progressive worsening of shortness of breath for 1 week from [facility].   In the ED, he was requiring BiPAP for increased work of breathing and hypoxemia.  Received ceftriaxone and azithromycin.  CT chest:  Large loculated right pleural effusion with dense consolidations within the RML and RLL.   Occlusion of the right bronchus intermedius.  Given continuous BiPAP requirement patient was admitted to the ICU.  Initial VBG was  7.30/60/30/31  3/31- given increased work of breathing on BiPAP a decision was made to intubate.  Patient was agreeable as well as his POA nephew.  SBT on 4/2 with high RSBI on pressure support 6/6  4/3-overnight, patient was maintained on pressure control 8/6 however he became tachypneic with tidal volumes of 150-160 and was switched back to volume control.  4/6/2025: Extubated to comfort care. Now   Currently on 5 L midflow    Plan:   level 4 CODE STATUS change and extubated on  4/6  Continue with pain management

## 2025-04-07 NOTE — WOUND OSTOMY CARE
Progress Note - Wound   Den Warren 84 y.o. male MRN: [id number]  Unit/Bed#: ICU 04 Encounter: [id number]    Patient has transitioned to level 4 comfort care--wound care team will sign-off at this time.      Orly WHITLOCKN, RN, CWON

## 2025-04-07 NOTE — ASSESSMENT & PLAN NOTE
Patient has pneumonia secondary to aspiration with possible loculated parapneumonic effusion.  3/31- chest tube was placed and bronchoscopy for mucous plugging at bedside.  Pleural fluid is positive for lights criteria indicating exudative.  pH 7.4  Negative strep and Legionella.  Negative AFB  Positive bronchial culture also for group B beta-hemolytic Streptococcus sensitive to penicillin.    Plan:  Comfort care.  If stable can be transfer to hospice [facility]

## 2025-04-07 NOTE — DISCHARGE SUMMARY
Discharge Summary - Critical Care/ICU   Name: Den Warren 84 y.o. male I MRN: [id number]  Unit/Bed#: ICU 04 I Date of Admission: 3/30/2025   Date of Service: 4/7/2025 I Hospital Day: 7     Assessment & Plan  Acute hypoxemic and hypercarbic respiratory failure (HCC)  Assessment:  Presented with progressive worsening of shortness of breath for 1 week from [facility].   In the ED, he was requiring BiPAP for increased work of breathing and hypoxemia.  Received ceftriaxone and azithromycin.  CT chest:  Large loculated right pleural effusion with dense consolidations within the RML and RLL.   Occlusion of the right bronchus intermedius.  Required continuous BiPAP requirement patient was admitted to the ICU.  Initial VBG was  7.30/60/30/31  3/31- given increased work of breathing on BiPAP a decision was made to intubate.  Patient was agreeable as well as his POA nephew.  SBT on 4/2 with high RSBI on pressure support 6/6  4/3-overnight, patient was maintained on pressure control 8/6 however he became tachypneic with tidal volumes of 150-160 and was switched back to volume control.  4/6/2025: Extubated to comfort care. Now   Weaned off oxygen prior to discharge    Plan:   level 4 CODE STATUS change and extubated on  4/6  Continue with pain management   Pneumonia  Patient has pneumonia secondary to aspiration with possible loculated parapneumonic effusion.  3/31- chest tube was placed and bronchoscopy for mucous plugging at bedside.  Pleural fluid is positive for lights criteria indicating exudative.  pH 7.4  Negative strep and Legionella.  Negative AFB  Positive bronchial culture also for group B beta-hemolytic Streptococcus sensitive to penicillin.    Plan:  Comfort care.    Acute kidney injury superimposed on stage 2 chronic kidney disease (HCC)  Lab Results   Component Value Date    EGFR 30 04/06/2025    EGFR 33 04/05/2025    EGFR 33 04/04/2025    CREATININE 1.93 (H) 04/06/2025    CREATININE 1.81 (H)  04/05/2025    CREATININE 1.79 (H) 04/04/2025     Baseline creatinine (0.9-1.2)  PTA creatinine 1.92  BUN/creatinine ratio > 20  Likely ATN in the setting of this.  Continue with comfort care      (HFpEF) heart failure with preserved ejection fraction (HCC)  Wt Readings from Last 3 Encounters:   04/05/25 74.9 kg (165 lb 2 oz)   02/21/24 77.6 kg (171 lb)   01/03/24 77.6 kg (171 lb)     Echocardiogram this admission shows EF 73%.  Normal diastolic function.  Mild aortic stenosis.    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HCC)  Plan to remove OG tube.   Pulmonary emphysema (HCC)  Last PFT 2020: Moderate obstructive lung disease with hyperinflation and low DLCO.  Home inhalers include Pulmicort, Spiriva, and albuterol.    GERD (gastroesophageal reflux disease)  Comfort care   Depression  Comfort measures   Anemia  Hospice     Ambulatory dysfunction  Discharged back to nursing facility on Hospice        Medical Problems       Resolved Problems  Date Reviewed: 3/31/2025          Resolved    Septic shock (HCC) 4/5/2025     Resolved by  Mariam Kennedy MD        Discharging Physician / Practitioner: Nhung Ferrera MD  PCP: No primary care provider on file.  Admission Date:   Admission Orders (From admission, onward)       Ordered        03/31/25 0144  INPATIENT ADMISSION  Once                          Discharge Date: 04/07/25    Consultations During Hospital Stay:  Hospice Care    Procedures Performed:   Chest tube, NG/OG, Urine catheter, intubation and extubation     Significant Findings / Test Results:   CT scan- Large, likely loculated right pleural effusion, with dense consolidations within the right middle lobe and right lower lobe. There is also occlusion of the right bronchus intermedius. This may be due to aspiration and postobstructive pneumonia, with   superimposed compressive atelectasis. However, a short-term follow-up chest CT is recommended to exclude an underlying mass.  2.  Small nonspecific pulmonary nodules.        Incidental  Findings:        Test Results Pending at Discharge (will require follow up):   None     Outpatient Tests Requested:  None    Complications:  None     Reason for Admission: None    Hospital Course:   Den Warren is a 83 y/o M hx Parkinson's, CKD II, mild AS, emphysema, GERD, ambulatory dysfunction who was admitted 3/31 for septic shock and acute hypoxemic respiratory failure due to aspiration PNA requiring invasive mechanical ventilation. Initially on BiPAP, CT chest demonstrated large loculated parapneumonic effusion of RML and RLL. Abx coverage w/ cefepime/vanco. 3/31 w/ increased work of breathing, subsequently intubated and chest tube placed for effusion drainage. Pleural fluid culture negative. Bronchoscopy done for pulmonary toileting and mucous plugging, culture positive GPC/GPR. MRSA nares positive.Required vasopressors 3/31-4/1. Echo showed EF 73%, normal diastolic function, mild AS. TPA/dornase started 4/1. SBT 4/1 w/ tachypnea prohibiting extubation. On 4/6 2025 patient family decided on extubation to comfort care.His code status was transitioned to Level 4.  Hospice consult was placed on 4/7/2025 and chest tube removed. Case management arranged for transfer back to [facility] on hospice.       Please see above list of diagnoses and related plan for additional information.     Condition at Discharge: stable    Discharge Day Visit / Exam:   * Please refer to separate progress note for these details *    Discussion with Family: Updated  (nephew) via phone.    Discharge instructions/Information to patient and family:   See after visit summary for information provided to patient and family.      Provisions for Follow-Up Care:  See after visit summary for information related to follow-up care and any pertinent home health orders.      Mobility at time of Discharge:   Basic Mobility Inpatient Raw Score: 6  -HL Goal: 2: Bed activities/Dependent transfer  -HLM Achieved: 1: Laying  in bed  HLM Goal NOT achieved. Continue to encourage mobility in post discharge setting.     Disposition:   [facility] Hosp[facility]     Planned Readmission: None    Discharge Medications:  See after visit summary for reconciled discharge medications provided to patient and/or family.

## 2025-04-07 NOTE — PLAN OF CARE
Problem: Prexisting or High Potential for Compromised Skin Integrity  Goal: Skin integrity is maintained or improved  Description: INTERVENTIONS:- Identify patients at risk for skin breakdown- Assess and monitor skin integrity- Assess and monitor nutrition and hydration status- Monitor labs - Assess for incontinence - Turn and reposition patient- Assist with mobility/ambulation- Relieve pressure over bony prominences- Avoid friction and shearing- Provide appropriate hygiene as needed including keeping skin clean and dry- Evaluate need for skin moisturizer/barrier cream- Collaborate with interdisciplinary team - Patient/family teaching- Consider wound care consult   Outcome: Progressing     Problem: SAFETY ADULT  Goal: Patient will remain free of falls  Description: INTERVENTIONS:- Educate patient/family on patient safety including physical limitations- Instruct patient to call for assistance with activity - Consult OT/PT to assist with strengthening/mobility - Keep Call bell within reach- Keep bed low and locked with side rails adjusted as appropriate- Keep care items and personal belongings within reach- Initiate and maintain comfort rounds- Make Fall Risk Sign visible to staff-  Apply yellow socks and bracelet for high fall risk patients- Consider moving patient to room near nurses station  INTERVENTIONS:- Educate patient/family on patient safety including physical limitations- Instruct patient to call for assistance with activity - Consult OT/PT to assist with strengthening/mobility - Keep Call bell within reach- Keep bed low and locked with side rails adjusted as appropriate- Keep care items and personal belongings within reach- Initiate and maintain comfort rounds- Make Fall Risk Sign visible to staff- Apply yellow socks and bracelet for high fall risk patients- Consider moving patient to room near nurses station  Outcome: Progressing  Goal: Maintain or return to baseline ADL function  Description:  INTERVENTIONS:-  Assess patient's ability to carry out ADLs; assess patient's baseline for ADL function and identify physical deficits which impact ability to perform ADLs (bathing, care of mouth/teeth, toileting, grooming, dressing, etc.)- Assess/evaluate cause of self-care deficits - Assess range of motion- Assess patient's mobility; develop plan if impaired- Assess patient's need for assistive devices and provide as appropriate- Encourage maximum independence but intervene and supervise when necessary- Involve family in performance of ADLs- Assess for home care needs following discharge - Consider OT consult to assist with ADL evaluation and planning for discharge- Provide patient education as appropriate  INTERVENTIONS:-  Assess patient's ability to carry out ADLs; assess patient's baseline for ADL function and identify physical deficits which impact ability to perform ADLs (bathing, care of mouth/teeth, toileting, grooming, dressing, etc.)- Assess/evaluate cause of self-care deficits - Assess range of motion- Assess patient's mobility; develop plan if impaired- Assess patient's need for assistive devices and provide as appropriate- Encourage maximum independence but intervene and supervise when necessary- Involve family in performance of ADLs- Assess for home care needs following discharge - Consider OT consult to assist with ADL evaluation and planning for discharge- Provide patient education as appropriate  Outcome: Progressing  Goal: Maintains/Returns to pre admission functional level  Description: INTERVENTIONS:- Perform AM-PAC 6 Click Basic Mobility/ Daily Activity assessment daily.- Set and communicate daily mobility goal to care team and patient/family/caregiver. - Collaborate with rehabilitation services on mobility goals if consulted- Out of bed for toileting- Record patient progress and toleration of activity level   INTERVENTIONS:- Perform AM-PAC 6 Click Basic Mobility/ Daily Activity assessment  daily.- Set and communicate daily mobility goal to care team and patient/family/caregiver. - Collaborate with rehabilitation services on mobility goals if consulted- Out of bed for toileting- Record patient progress and toleration of activity level   Outcome: Progressing     Problem: DISCHARGE PLANNING  Goal: Discharge to home or other facility with appropriate resources  Description: INTERVENTIONS:- Identify barriers to discharge w/patient and caregiver- Arrange for needed discharge resources and transportation as appropriate- Identify discharge learning needs (meds, wound care, etc.)- Arrange for interpretive services to assist at discharge as needed- Refer to Case Management Department for coordinating discharge planning if the patient needs post-hospital services based on physician/advanced practitioner order or complex needs related to functional status, cognitive ability, or social support system  INTERVENTIONS:- Identify barriers to discharge w/patient and caregiver- Arrange for needed discharge resources and transportation as appropriate- Identify discharge learning needs (meds, wound care, etc.)- Arrange for interpretive services to assist at discharge as needed- Refer to Case Management Department for coordinating discharge planning if the patient needs post-hospital services based on physician/advanced practitioner order or complex needs related to functional status, cognitive ability, or social support system  Outcome: Progressing     Problem: Knowledge Deficit  Goal: Patient/family/caregiver demonstrates understanding of disease process, treatment plan, medications, and discharge instructions  Description: Complete learning assessment and assess knowledge base.Interventions:- Provide teaching at level of understanding- Provide teaching via preferred learning methods  Complete learning assessment and assess knowledge base.Interventions:- Provide teaching at level of understanding- Provide teaching via  preferred learning methods  Outcome: Progressing     Problem: PAIN - ADULT  Goal: Verbalizes/displays adequate comfort level or baseline comfort level  Description: Interventions:- Encourage patient to monitor pain and request assistance- Assess pain using appropriate pain scale- Administer analgesics based on type and severity of pain and evaluate response- Implement non-pharmacological measures as appropriate and evaluate response- Consider cultural and social influences on pain and pain management- Notify physician/advanced practitioner if interventions unsuccessful or patient reports new pain  Outcome: Progressing     Problem: INFECTION - ADULT  Goal: Absence or prevention of progression during hospitalization  Description: INTERVENTIONS:- Assess and monitor for signs and symptoms of infection- Monitor lab/diagnostic results- Monitor all insertion sites, i.e. indwelling lines, tubes, and drains- Monitor endotracheal if appropriate and nasal secretions for changes in amount and color- [place] appropriate cooling/warming therapies per order- Administer medications as ordered- Instruct and encourage patient and family to use good hand hygiene technique- Identify and instruct in appropriate isolation precautions for identified infection/condition  Outcome: Progressing     Problem: RESPIRATORY - ADULT  Goal: Achieves optimal ventilation and oxygenation  Description: INTERVENTIONS:- Assess for changes in respiratory status- Assess for changes in mentation and behavior- Position to facilitate oxygenation and minimize respiratory effort- Oxygen administered by appropriate delivery if ordered- Initiate smoking cessation education as indicated- Encourage broncho-pulmonary hygiene including cough, deep breathe, Incentive Spirometry- Assess the need for suctioning and aspirate as needed- Assess and instruct to report SOB or any respiratory difficulty- Respiratory Therapy support as indicated  Outcome: Progressing      Problem: Nutrition/Hydration-ADULT  Goal: Nutrient/Hydration intake appropriate for improving, restoring or maintaining nutritional needs  Description: Monitor and assess patient's nutrition/hydration status for malnutrition. Collaborate with interdisciplinary team and initiate plan and interventions as ordered.  Monitor patient's weight and dietary intake as ordered or per policy. Utilize nutrition screening tool and intervene as necessary. Determine patient's food preferences and provide high-protein, high-caloric foods as appropriate. INTERVENTIONS:- Monitor oral intake, urinary output, labs, and treatment plans- Assess nutrition and hydration status and recommend course of action- Evaluate amount of meals eaten- Assist patient with eating if necessary - Allow adequate time for meals- Recommend/ encourage appropriate diets, oral nutritional supplements, and vitamin/mineral supplements- Order, calculate, and assess calorie counts as needed- Recommend, monitor, and adjust tube feedings and TPN/PPN based on assessed needs- Assess need for intravenous fluids- Provide specific nutrition/hydration education as appropriate- Include patient/family/caregiver in decisions related to nutrition  Monitor and assess patient's nutrition/hydration status for malnutrition. Collaborate with interdisciplinary team and initiate plan and interventions as ordered.  Monitor patient's weight and dietary intake as ordered or per policy. Utilize nutrition screening tool and intervene as necessary. Determine patient's food preferences and provide high-protein, high-caloric foods as appropriate. INTERVENTIONS:- Monitor oral intake, urinary output, labs, and treatment plans- Assess nutrition and hydration status and recommend course of action- Evaluate amount of meals eaten- Assist patient with eating if necessary - Allow adequate time for meals- Recommend/ encourage appropriate diets, oral nutritional supplements, and vitamin/mineral  supplements- Order, calculate, and assess calorie counts as needed- Recommend, monitor, and adjust tube feedings and TPN/PPN based on assessed needs- Assess need for intravenous fluids- Provide specific nutrition/hydration education as appropriate- Include patient/family/caregiver in decisions related to nutrition  Outcome: Progressing     Problem: COPING  Goal: Pt/Family able to verbalize concerns and demonstrate effective coping strategies  Description: INTERVENTIONS:- Assist patient/family to identify coping skills, available support systems and cultural and spiritual values- Provide emotional support, including active listening and acknowledgement of concerns of patient and caregivers- Reduce environmental stimuli, as able- Provide patient education- Assess for spiritual pain/suffering and initiate spiritual care, including notification of Pastoral Care or [place]andal based community as needed- Assess effectiveness of coping strategies  Outcome: Progressing  Goal: Will report anxiety at manageable levels  Description: INTERVENTIONS:- Administer medication as ordered- Teach and encourage coping skills- Provide emotional support- Assess patient/family for anxiety and ability to cope  Outcome: Progressing     Problem: SAFETY,RESTRAINT: NV/NON-SELF DESTRUCTIVE BEHAVIOR  Goal: Remains free of harm/injury (restraint for non violent/non self-detsructive behavior)  Description: INTERVENTIONS:- Instruct patient/family regarding restraint use - Assess and monitor physiologic and psychological status - Provide interventions and comfort measures to meet assessed patient needs - Identify and implement measures to help patient regain control- Assess readiness for release of restraint   Outcome: Progressing  Goal: Returns to optimal restraint-free functioning  Description: INTERVENTIONS:- Assess the patient's behavior and symptoms that indicate continued need for restraint- Identify and implement measures to help patient regain  control- Assess readiness for release of restraint   Outcome: Progressing

## 2025-04-07 NOTE — ASSESSMENT & PLAN NOTE
Lab Results   Component Value Date    EGFR 30 04/06/2025    EGFR 33 04/05/2025    EGFR 33 04/04/2025    CREATININE 1.93 (H) 04/06/2025    CREATININE 1.81 (H) 04/05/2025    CREATININE 1.79 (H) 04/04/2025     Baseline creatinine (0.9-1.2)  PTA creatinine 1.92  BUN/creatinine ratio > 20  Likely ATN in the setting of this.  Continue with comfort care

## 2025-04-07 NOTE — HOSPICE NOTE
Hospice referral received.  Approved [facility]. [facility].  Geovanni Donovan pt is a resident at [facility]. Per hospice manager new contract needed if pt goes back with our hospice services.  Yoli updated via secure chat.  Pc to pt nephmargi Borden.  Hospice services and benefits discussed by  guidelines.  Geovanni Borden is is coming to the hospital later today and he would like to talk to pt before deciding on hospice services.  Requesting liaison to call back in the morning.  Secure message sent to Yoli and ICU team with update. Hospice liaison to follow

## 2025-04-08 LAB
MYCOBACTERIUM SPEC CULT: NORMAL
RHODAMINE-AURAMINE STN SPEC: NORMAL

## 2025-04-09 LAB — PNEUMOCYSTIS PCR: NEGATIVE

## 2025-04-14 LAB — FUNGUS SPEC CULT: NORMAL

## 2025-04-15 LAB
MYCOBACTERIUM SPEC CULT: NORMAL
RHODAMINE-AURAMINE STN SPEC: NORMAL

## 2025-04-21 LAB — FUNGUS SPEC CULT: NORMAL

## 2025-04-22 LAB
MYCOBACTERIUM SPEC CULT: NORMAL
RHODAMINE-AURAMINE STN SPEC: NORMAL

## 2025-04-28 LAB — FUNGUS SPEC CULT: NORMAL

## 2025-04-29 LAB
MYCOBACTERIUM SPEC CULT: NORMAL
RHODAMINE-AURAMINE STN SPEC: NORMAL

## 2025-05-05 LAB — FUNGUS SPEC CULT: NORMAL

## 2025-05-06 LAB
MYCOBACTERIUM SPEC CULT: NORMAL
RHODAMINE-AURAMINE STN SPEC: NORMAL

## 2025-05-13 LAB
MYCOBACTERIUM SPEC CULT: NORMAL
RHODAMINE-AURAMINE STN SPEC: NORMAL

## 2025-05-20 LAB
MYCOBACTERIUM SPEC CULT: NORMAL
RHODAMINE-AURAMINE STN SPEC: NORMAL
